# Patient Record
Sex: MALE
[De-identification: names, ages, dates, MRNs, and addresses within clinical notes are randomized per-mention and may not be internally consistent; named-entity substitution may affect disease eponyms.]

---

## 2017-10-25 ENCOUNTER — HOSPITAL ENCOUNTER (INPATIENT)
Dept: HOSPITAL 25 - MEDTELE | Age: 62
LOS: 19 days | Discharge: NURSING, REHAB OR LONG-TERM CARE | DRG: 871 | End: 2017-11-13
Attending: HOSPITALIST | Admitting: HOSPITALIST
Payer: MEDICARE

## 2017-10-25 DIAGNOSIS — I50.9: ICD-10-CM

## 2017-10-25 DIAGNOSIS — I87.8: ICD-10-CM

## 2017-10-25 DIAGNOSIS — Z88.1: ICD-10-CM

## 2017-10-25 DIAGNOSIS — R74.8: ICD-10-CM

## 2017-10-25 DIAGNOSIS — I89.0: ICD-10-CM

## 2017-10-25 DIAGNOSIS — Z99.3: ICD-10-CM
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
## 2017-10-25 DIAGNOSIS — E11.40: ICD-10-CM

## 2017-10-25 DIAGNOSIS — L03.116: ICD-10-CM

## 2017-10-25 DIAGNOSIS — I24.8: ICD-10-CM

## 2017-10-25 DIAGNOSIS — Z88.0: ICD-10-CM

## 2017-10-25 DIAGNOSIS — E87.2: ICD-10-CM

## 2017-10-25 DIAGNOSIS — I24.1: ICD-10-CM

## 2017-10-25 DIAGNOSIS — M62.82: ICD-10-CM

## 2017-10-25 DIAGNOSIS — E78.5: ICD-10-CM

## 2017-10-25 DIAGNOSIS — J96.01: ICD-10-CM

## 2017-10-25 DIAGNOSIS — R33.9: ICD-10-CM

## 2017-10-25 DIAGNOSIS — E11.22: ICD-10-CM

## 2017-10-25 DIAGNOSIS — G93.40: ICD-10-CM

## 2017-10-25 DIAGNOSIS — I48.0: ICD-10-CM

## 2017-10-25 DIAGNOSIS — I95.9: ICD-10-CM

## 2017-10-25 DIAGNOSIS — A41.9: Primary | ICD-10-CM

## 2017-10-25 DIAGNOSIS — N39.0: ICD-10-CM

## 2017-10-25 DIAGNOSIS — K58.9: ICD-10-CM

## 2017-10-25 DIAGNOSIS — N18.9: ICD-10-CM

## 2017-10-25 DIAGNOSIS — Z88.8: ICD-10-CM

## 2017-10-25 DIAGNOSIS — Z88.2: ICD-10-CM

## 2017-10-25 DIAGNOSIS — Z86.14: ICD-10-CM

## 2017-10-25 DIAGNOSIS — Z90.49: ICD-10-CM

## 2017-10-25 DIAGNOSIS — N17.9: ICD-10-CM

## 2017-10-25 DIAGNOSIS — N50.89: ICD-10-CM

## 2017-10-25 DIAGNOSIS — F32.9: ICD-10-CM

## 2017-10-25 DIAGNOSIS — R41.0: ICD-10-CM

## 2017-10-25 DIAGNOSIS — I13.0: ICD-10-CM

## 2017-10-25 DIAGNOSIS — E66.01: ICD-10-CM

## 2017-10-25 DIAGNOSIS — Z87.891: ICD-10-CM

## 2017-10-25 DIAGNOSIS — R21: ICD-10-CM

## 2017-10-25 DIAGNOSIS — L03.115: ICD-10-CM
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
## 2017-10-25 LAB
ALBUMIN SERPL BCG-MCNC: 1.7 G/DL (ref 3.2–5.2)
ALBUMIN SERPL BCG-MCNC: 2.7 G/DL (ref 3.2–5.2)
ALP SERPL-CCNC: 56 U/L (ref 34–104)
ALP SERPL-CCNC: 97 U/L (ref 34–104)
ALT SERPL W P-5'-P-CCNC: 13 U/L (ref 7–52)
ALT SERPL W P-5'-P-CCNC: 7 U/L (ref 7–52)
ANION GAP SERPL CALC-SCNC: 11 MMOL/L (ref 2–11)
ANION GAP SERPL CALC-SCNC: 7 MMOL/L (ref 2–11)
AST SERPL-CCNC: 27 U/L (ref 13–39)
AST SERPL-CCNC: 46 U/L (ref 13–39)
BUN SERPL-MCNC: 45 MG/DL (ref 6–24)
BUN SERPL-MCNC: 64 MG/DL (ref 6–24)
BUN/CREAT SERPL: 24.5 (ref 8–20)
BUN/CREAT SERPL: 26.5 (ref 8–20)
CALCIUM SERPL-MCNC: 5.6 MG/DL (ref 8.6–10.3)
CALCIUM SERPL-MCNC: 8.7 MG/DL (ref 8.6–10.3)
CHLORIDE SERPL-SCNC: 100 MMOL/L (ref 101–111)
CHLORIDE SERPL-SCNC: 116 MMOL/L (ref 101–111)
CK SERPL-CCNC: 465 U/L (ref 10–223)
FERRITIN SERPL IA-MCNC: 92.6 NG/ML (ref 24–336)
FOLATE SERPL-MCNC: 7.59 NG/ML (ref 3.99–?)
GLOBULIN SER CALC-MCNC: 2.5 G/DL (ref 2–4)
GLOBULIN SER CALC-MCNC: 3.5 G/DL (ref 2–4)
GLUCOSE SERPL-MCNC: 120 MG/DL (ref 70–100)
GLUCOSE SERPL-MCNC: 66 MG/DL (ref 70–100)
HCO3 SERPL-SCNC: 15 MMOL/L (ref 22–32)
HCO3 SERPL-SCNC: 21 MMOL/L (ref 22–32)
HCT VFR BLD AUTO: 25 % (ref 42–52)
HCT VFR BLD AUTO: 32 % (ref 42–52)
HGB BLD-MCNC: 10.5 G/DL (ref 14–18)
HGB BLD-MCNC: 8.2 G/DL (ref 14–18)
IRON SERPL-MCNC: < 15 UG/DL (ref 50–212)
MCH RBC QN AUTO: 29 PG (ref 27–31)
MCH RBC QN AUTO: 30 PG (ref 27–31)
MCHC RBC AUTO-ENTMCNC: 33 G/DL (ref 31–36)
MCHC RBC AUTO-ENTMCNC: 33 G/DL (ref 31–36)
MCV RBC AUTO: 89 FL (ref 80–94)
MCV RBC AUTO: 91 FL (ref 80–94)
POTASSIUM SERPL-SCNC: 2.8 MMOL/L (ref 3.5–5)
POTASSIUM SERPL-SCNC: 4.6 MMOL/L (ref 3.5–5)
PROT SERPL-MCNC: 4.2 G/DL (ref 6.4–8.9)
PROT SERPL-MCNC: 6.2 G/DL (ref 6.4–8.9)
RBC # BLD AUTO: 2.77 10^6/UL (ref 4–5.4)
RBC # BLD AUTO: 3.64 10^6/UL (ref 4–5.4)
SODIUM SERPL-SCNC: 132 MMOL/L (ref 133–145)
SODIUM SERPL-SCNC: 138 MMOL/L (ref 133–145)
TIBC SERPL-MCNC: 307 MCG/DL (ref 250–450)
TRANSFERRIN SERPL-MCNC: 219 MG/DL (ref 203–362)
TROPONIN I SERPL-MCNC: 0.28 NG/ML (ref ?–0.04)
TROPONIN I SERPL-MCNC: 0.38 NG/ML (ref ?–0.04)
VIT B12 SERPL-MCNC: 1193 PG/ML (ref 180–914)
WBC # BLD AUTO: 11.1 10^3/UL (ref 3.5–10.8)
WBC # BLD AUTO: 15 10^3/UL (ref 3.5–10.8)

## 2017-10-25 PROCEDURE — 90935 HEMODIALYSIS ONE EVALUATION: CPT

## 2017-10-25 PROCEDURE — 82550 ASSAY OF CK (CPK): CPT

## 2017-10-25 PROCEDURE — 80074 ACUTE HEPATITIS PANEL: CPT

## 2017-10-25 PROCEDURE — 82272 OCCULT BLD FECES 1-3 TESTS: CPT

## 2017-10-25 PROCEDURE — 93005 ELECTROCARDIOGRAM TRACING: CPT

## 2017-10-25 PROCEDURE — 83935 ASSAY OF URINE OSMOLALITY: CPT

## 2017-10-25 PROCEDURE — 83880 ASSAY OF NATRIURETIC PEPTIDE: CPT

## 2017-10-25 PROCEDURE — C8929 TTE W OR WO FOL WCON,DOPPLER: HCPCS

## 2017-10-25 PROCEDURE — 83550 IRON BINDING TEST: CPT

## 2017-10-25 PROCEDURE — 84484 ASSAY OF TROPONIN QUANT: CPT

## 2017-10-25 PROCEDURE — 81015 MICROSCOPIC EXAM OF URINE: CPT

## 2017-10-25 PROCEDURE — 36558 INSERT TUNNELED CV CATH: CPT

## 2017-10-25 PROCEDURE — 86850 RBC ANTIBODY SCREEN: CPT

## 2017-10-25 PROCEDURE — 82728 ASSAY OF FERRITIN: CPT

## 2017-10-25 PROCEDURE — 86901 BLOOD TYPING SEROLOGIC RH(D): CPT

## 2017-10-25 PROCEDURE — 85025 COMPLETE CBC W/AUTO DIFF WBC: CPT

## 2017-10-25 PROCEDURE — 80048 BASIC METABOLIC PNL TOTAL CA: CPT

## 2017-10-25 PROCEDURE — 84100 ASSAY OF PHOSPHORUS: CPT

## 2017-10-25 PROCEDURE — 80053 COMPREHEN METABOLIC PANEL: CPT

## 2017-10-25 PROCEDURE — 87641 MR-STAPH DNA AMP PROBE: CPT

## 2017-10-25 PROCEDURE — 84134 ASSAY OF PREALBUMIN: CPT

## 2017-10-25 PROCEDURE — 86140 C-REACTIVE PROTEIN: CPT

## 2017-10-25 PROCEDURE — 85610 PROTHROMBIN TIME: CPT

## 2017-10-25 PROCEDURE — 84466 ASSAY OF TRANSFERRIN: CPT

## 2017-10-25 PROCEDURE — 99156 MOD SED OTH PHYS/QHP 5/>YRS: CPT

## 2017-10-25 PROCEDURE — 93306 TTE W/DOPPLER COMPLETE: CPT

## 2017-10-25 PROCEDURE — C1752 CATH,HEMODIALYSIS,SHORT-TERM: HCPCS

## 2017-10-25 PROCEDURE — 84520 ASSAY OF UREA NITROGEN: CPT

## 2017-10-25 PROCEDURE — 85014 HEMATOCRIT: CPT

## 2017-10-25 PROCEDURE — 80076 HEPATIC FUNCTION PANEL: CPT

## 2017-10-25 PROCEDURE — 93970 EXTREMITY STUDY: CPT

## 2017-10-25 PROCEDURE — 86900 BLOOD TYPING SEROLOGIC ABO: CPT

## 2017-10-25 PROCEDURE — 83735 ASSAY OF MAGNESIUM: CPT

## 2017-10-25 PROCEDURE — P9047 ALBUMIN (HUMAN), 25%, 50ML: HCPCS

## 2017-10-25 PROCEDURE — 87040 BLOOD CULTURE FOR BACTERIA: CPT

## 2017-10-25 PROCEDURE — 76770 US EXAM ABDO BACK WALL COMP: CPT

## 2017-10-25 PROCEDURE — 81003 URINALYSIS AUTO W/O SCOPE: CPT

## 2017-10-25 PROCEDURE — 94760 N-INVAS EAR/PLS OXIMETRY 1: CPT

## 2017-10-25 PROCEDURE — 87186 SC STD MICRODIL/AGAR DIL: CPT

## 2017-10-25 PROCEDURE — 87077 CULTURE AEROBIC IDENTIFY: CPT

## 2017-10-25 PROCEDURE — 85730 THROMBOPLASTIN TIME PARTIAL: CPT

## 2017-10-25 PROCEDURE — 87086 URINE CULTURE/COLONY COUNT: CPT

## 2017-10-25 PROCEDURE — G0257 UNSCHED DIALYSIS ESRD PT HOS: HCPCS

## 2017-10-25 PROCEDURE — 83540 ASSAY OF IRON: CPT

## 2017-10-25 PROCEDURE — 71010: CPT

## 2017-10-25 PROCEDURE — 82570 ASSAY OF URINE CREATININE: CPT

## 2017-10-25 PROCEDURE — 85018 HEMOGLOBIN: CPT

## 2017-10-25 PROCEDURE — 36415 COLL VENOUS BLD VENIPUNCTURE: CPT

## 2017-10-25 PROCEDURE — 82040 ASSAY OF SERUM ALBUMIN: CPT

## 2017-10-25 PROCEDURE — 82803 BLOOD GASES ANY COMBINATION: CPT

## 2017-10-25 PROCEDURE — 82746 ASSAY OF FOLIC ACID SERUM: CPT

## 2017-10-25 PROCEDURE — 94660 CPAP INITIATION&MGMT: CPT

## 2017-10-25 PROCEDURE — 83605 ASSAY OF LACTIC ACID: CPT

## 2017-10-25 PROCEDURE — 84300 ASSAY OF URINE SODIUM: CPT

## 2017-10-25 PROCEDURE — 70450 CT HEAD/BRAIN W/O DYE: CPT

## 2017-10-25 PROCEDURE — 77001 FLUOROGUIDE FOR VEIN DEVICE: CPT

## 2017-10-25 PROCEDURE — 36600 WITHDRAWAL OF ARTERIAL BLOOD: CPT

## 2017-10-25 PROCEDURE — 99157 MOD SED OTHER PHYS/QHP EA: CPT

## 2017-10-25 PROCEDURE — 76937 US GUIDE VASCULAR ACCESS: CPT

## 2017-10-25 PROCEDURE — 82140 ASSAY OF AMMONIA: CPT

## 2017-10-25 PROCEDURE — 83921 ORGANIC ACID SINGLE QUANT: CPT

## 2017-10-25 PROCEDURE — 82607 VITAMIN B-12: CPT

## 2017-10-25 RX ADMIN — SODIUM CHLORIDE SCH MLS/HR: 900 IRRIGANT IRRIGATION at 17:56

## 2017-10-25 RX ADMIN — INSULIN LISPRO SCH: 100 INJECTION, SOLUTION INTRAVENOUS; SUBCUTANEOUS at 17:56

## 2017-10-25 NOTE — RAD
INDICATION: Pain and swelling.     



COMPARISON: None

 

TECHNIQUE: Duplex interrogation of the Lowerextremity was performed.



FINDINGS: 



Deep veins: The common femoral, great saphenous, profunda femoris, proximal, mid, and

distal deep femoral, popliteal, posterior tibial, and peroneal veins are patent. There is

normal compressibility, augmentation, and phasic flow.



Superficial veins: There are no findings of superficial thrombophlebitis.



Popliteal fossa:There is no evidence of a popliteal cyst.



Soft tissues:There are no soft tissue abnormalities.



IMPRESSION:  Normal examination. No evidence of deep venous thrombosis

## 2017-10-25 NOTE — RAD
INDICATION: Atrial fibrillation. Sepsis. CHF.    



COMPARISON: None

 

TECHNIQUE: An AP portable semierect view obtained at 1603 hours is submitted.



FINDINGS: 



Bones/Soft Tissues: There are no acute bony findings.    



Cardiomediastinal: The cardiac silhouette is prominent. 



Lungs: There are no infiltrates. The examination is mildly expiratory with vascular

crowding



Pleura: There are no pleural effusions. 



Other: None



IMPRESSION: Mildly prominent cardiac silhouette. Expiratory film. Lungs clear.

## 2017-10-26 LAB
ANION GAP SERPL CALC-SCNC: 10 MMOL/L (ref 2–11)
BUN SERPL-MCNC: 67 MG/DL (ref 6–24)
BUN SERPL-MCNC: 68 MG/DL (ref 6–24)
BUN/CREAT SERPL: 25 (ref 8–20)
CALCIUM SERPL-MCNC: 8.7 MG/DL (ref 8.6–10.3)
CHLORIDE SERPL-SCNC: 100 MMOL/L (ref 101–111)
CK SERPL-CCNC: 395 U/L (ref 10–223)
CK SERPL-CCNC: 575 U/L (ref 10–223)
GLUCOSE SERPL-MCNC: 121 MG/DL (ref 70–100)
HCO3 SERPL-SCNC: 20 MMOL/L (ref 22–32)
HCT VFR BLD AUTO: 33 % (ref 42–52)
HGB BLD-MCNC: 10.5 G/DL (ref 14–18)
HGB BLD-MCNC: 10.7 G/DL (ref 14–18)
HGB BLD-MCNC: 10.8 G/DL (ref 14–18)
MCH RBC QN AUTO: 29 PG (ref 27–31)
MCH RBC QN AUTO: 29 PG (ref 27–31)
MCHC RBC AUTO-ENTMCNC: 32 G/DL (ref 31–36)
MCHC RBC AUTO-ENTMCNC: 32 G/DL (ref 31–36)
MCV RBC AUTO: 89 FL (ref 80–94)
MCV RBC AUTO: 90 FL (ref 80–94)
POTASSIUM SERPL-SCNC: 4.6 MMOL/L (ref 3.5–5)
RBC # BLD AUTO: 3.63 10^6/UL (ref 4–5.4)
RBC # BLD AUTO: 3.7 10^6/UL (ref 4–5.4)
SODIUM SERPL-SCNC: 130 MMOL/L (ref 133–145)
SODIUM UR-SCNC: < 18 MMOL/L
TROPONIN I SERPL-MCNC: 0.37 NG/ML (ref ?–0.04)
WBC # BLD AUTO: 13.5 10^3/UL (ref 3.5–10.8)
WBC # BLD AUTO: 14.4 10^3/UL (ref 3.5–10.8)

## 2017-10-26 RX ADMIN — HEPARIN SODIUM SCH UNITS: 5000 INJECTION INTRAVENOUS; SUBCUTANEOUS at 11:04

## 2017-10-26 RX ADMIN — INSULIN LISPRO SCH UNITS: 100 INJECTION, SOLUTION INTRAVENOUS; SUBCUTANEOUS at 12:26

## 2017-10-26 RX ADMIN — METOPROLOL TARTRATE SCH MG: 50 TABLET, FILM COATED ORAL at 11:04

## 2017-10-26 RX ADMIN — INSULIN LISPRO SCH UNITS: 100 INJECTION, SOLUTION INTRAVENOUS; SUBCUTANEOUS at 18:02

## 2017-10-26 RX ADMIN — DIPHENHYDRAMINE HYDROCHLORIDE PRN MG: 25 CAPSULE ORAL at 02:09

## 2017-10-26 RX ADMIN — HEPARIN SODIUM AND DEXTROSE SCH MLS/HR: 5000; 5 INJECTION INTRAVENOUS at 11:03

## 2017-10-26 RX ADMIN — DIPHENHYDRAMINE HYDROCHLORIDE PRN MG: 25 CAPSULE ORAL at 22:02

## 2017-10-26 RX ADMIN — INSULIN LISPRO SCH UNITS: 100 INJECTION, SOLUTION INTRAVENOUS; SUBCUTANEOUS at 08:11

## 2017-10-26 RX ADMIN — METOPROLOL TARTRATE SCH: 50 TABLET, FILM COATED ORAL at 22:07

## 2017-10-26 RX ADMIN — SODIUM CHLORIDE SCH MLS/HR: 900 IRRIGANT IRRIGATION at 15:34

## 2017-10-26 NOTE — ECHO
Patient:      LESLEE MOREAU

Med Rec#:     [id number]            :          1955          

Date:         10/26/2017            Age:          62y                 

Account#:     [id number]          Height:       185.42 cm / 73.0 in

Accession#:   [id number]           Weight:       191.42 kg / 421.9 lbs

Sex:          M                     BSA:          2.96

Room#:        453                   

Admit Date#:  10/25/2017          

Type:         Inpatient

 

Referring:    Miguel Hopson NP

Reading:      Paul Caballero MD

Sonographer:  Diana Beth RDCS

CC:           Tino Loza DO

______________________________________________________________________

 

Transthoracic Echocardiogram

 

Indication:

CHF, A-fib, elevated troponins. 

BP:           104/76

HR:           102

Rhythm:       A-Fib

 

Findings     

History:

Morbid obesity, DM, CHF, A-fib, HTN, HLD, MRSA, CKD, former smoker.  

 

Technical Comments:

The study is technically difficult.  The study is technically limited

due to poor apical windows.  The study is technically limited due to

patient body habitus.  The study was technically limited due to the

patient's inability to lay in the left lateral decubitus position. 

Completed at 0915. 

 

Left Ventricle:

The left ventricular chamber size is mildly dilated. Moderate concentric

left ventricular hypertrophy is observed. There is a focal wall motion

abnormality present.In limited views and with contrast the inferior wall

appears hypokinetic as does the interventricular septum. There is mild

to moderately decreased left ventricular systolic function.  The

estimated ejection fraction is 40-45%.  There is septal flattening of

the interventricular septum consistent with right ventricular volume or

pressure overload.  The assessment of diastolic function is

non-diagnostic. 

 

Left Atrium:

The left atrium is not well visualized. The left atrium is moderate to

severely dilated.  

 

Right Ventricle:

The right ventricle is not well visualized. Moderator Band present. The

right ventricle is moderate to severely dilated.  The right ventricular

global systolic function is mildly to moderately reduced.Imagee qualty

is suboptimal for accurate assessment 

 

Right Atrium:

The right atrium is not well visualized. The right atrial cavity size is

severely dilated. 

 

Aortic Valve:

The aortic valve is trileaflet. The aortic valve leaflets are moderately

thickened.The noncoroanry cusp has a calcified nodule on it. There is a

trace of aortic regurgitation. There is borderline aortic stenosis

present. 

 

Mitral Valve:

The mitral valve leaflets are mildly thickened. There is a trace of

mitral regurgitation. There is no evidence of mitral stenosis. 

 

Tricuspid Valve:

The tricuspid valve leaflets are normal.  There is mild tricuspid

regurgitation. The right ventricular systolic pressure is estimated at

31 mmHg.  There is evidence that pulmonary hypertension may be

underestimated. There is no tricuspid stenosis. 

 

Pulmonic Valve:

The pulmonic valve appears normal. There is trace to mild pulmonic

regurgitation. There is no pulmonic stenosis.  

 

Pericardium:

There is no significant pericardial effusion. A pericardial fat pad is

visualized. 

 

Aorta:

There is no dilatation of the ascending aorta. The aortic arch is not

well visualized.  The aortic root is normal in size. 

 

Pulmonary Artery:

The main pulmonary artery appears normal. 

 

Venous:

The venous system is not well visualized. The inferior vena cava is not

visualized. 

 

Contrast:

Definity was used to optimize study.  6 mL of diluted Definity was

utilized.  Intravenous contrast was used to enhance endocardial border

definition. 

 

Conclusions

The study is technically difficult making comments ingeneral less

reliable. 

The study is technically limited due to poor apical windows. 

The study is technically limited due to patient body habitus. 

The study was technically limited due to the patient's inability to lay

in the left lateral decubitus position.  

There is a focal wall motion abnormality present.In limited views and

with contrast the inferior wall appears hypokinetic as does the

interventricular septum.

There is mild to moderately decreased left ventricular systolic

function. 

The estimated ejection fraction is 40-45%. 

There is septal flattening of the interventricular septum consistent

with right ventricular volume or pressure overload. 

The left atrium is moderate to severely dilated. 

The right ventricle is moderate to severely dilated. 

The right ventricular global systolic function is mild to moderately

reduced.

The right atrial cavity size is severely dilated.

There is a trace of aortic regurgitation.

There is borderline aortic stenosis present.

There is a trace of mitral regurgitation.

There is mild tricuspid regurgitation.

There is trace to mild pulmonic regurgitation.

No reports of prior studies are offered for comparison.

 

Measurements     

Name                    Value         Normal Range            

RVIDd (AP) 2D           4.5 cm        (0.9 - 2.6)             

RVDdMajor (2D)          5.2 cm        (2.2 - 4.4)             

RAd ISD 4CH             6.48 cm       (3.4 - 4.9)             

RA (A4C)W               5.62 cm       (2.9 - 4.6)             

IVSd (2D)               1.5 cm        (0.6 - 1)               

LVPWd (2D)              1.4 cm        (0.6 - 1)               

LVIDd (2D)              5.7 cm        (3.6 - 5.4)             

LVIDs (2D)              4.4 cm        -                        

LV FS (2D)              22 %          (25 - 45)               

Aortic Annulus          2 cm          (1.4 - 2.6)             

Ao root diameter (2D)   2.8 cm        (2.1 - 3.5)             

Ascending Ao            3.1 cm        (2.1 - 3.4)             

LA dimension (AP) 2D    6.3 cm        (2.3 - 3.8)             

LAd ISD 4CH             7.9 cm        (2.9 - 5.3)             

LA ISD 4CH W            6.1 cm        (2.5 - 4.5)             

 

Name                    Value         Normal Range            

MV E-wave Vmax          1.11 m/sec    -                        

MV deceleration time    222.8 msec    -                        

LV septal e' Vmax       0.12 m/sec    -                        

LV lateral e' Vmax      0.11 m/sec    -                        

LV E:e' septal ratio    9.25 ratio    -                        

LV E:e' lateral ratio   10.09 ratio   -                        

 

Name                    Value         Normal Range            

AV Vmax                 1.96 m/sec    -                        

AV VTI                  51.04 cm      -                        

AV peak gradient        15.51 mmHg    -                        

AV mean gradient        10.28 mmHg    -                        

LVOT Vmax               0.8 m/sec     -                        

LVOT VTI                13.4 cm       -                        

LVOT peak gradient      2.58 mmHg     -                        

LVOT mean gradient      1.46 mmHg     -                        

PAUL Vmax                1 m/sec       -                        

 

Name                    Value         Normal Range            

TR Vmax                 2.4 m/sec     -                        

TR peak gradient        23 mmHg       -                        

RAP                     8 mmHg        -                        

RVSP                    31 mmHg       -                        

 

Name                    Value         Normal Range            

PV Vmax                 0.7 m/sec     -                        

PV peak gradient        2.1 mmHg      -                        

 

Electronically signed by: Paul Caballero MD on 10/26/2017 09:55:18

## 2017-10-26 NOTE — PN
Subjective


Date of Service: 10/26/17


Interval History: 





Patient's only acute complaint is itching rash on the right side of his back. 

This is new and has not been experienced before at home. Questionable 

correlation between first administration of vancomycin and onset of rash. No 

other signs of red man syndrome. Partial relief with Benadryl. No CP, SOB, N/V, 

F/C, Abdominal Pain, dysuria. Persistent pain in leg. 


Family History: Unchanged from Admission


Social History: Unchanged from Admission


Past Medical History: Unchanged from Admission





Objective


Active Medications: 








Acetaminophen (Tylenol Tab*)  650 mg PO Q4H PRN


   PRN Reason: FEVER/PAIN


Dextrose (D50w Syringe 50 Ml*)  12.5 gm IV PUSH .FOR FS < 60 - SS PRN


   PRN Reason: FS < 60


Diphenhydramine HCl (Benadryl Po*)  25 mg PO Q6H PRN


   PRN Reason: ITCHING


   Last Admin: 10/26/17 02:09 Dose:  25 mg


Heparin Sodium (Porcine) (Heparin Vial(*))  0 units IV .PER PROTOCOL ALEJANDRA


   PRN Reason: Protocol


   Last Admin: 10/26/17 11:04 Dose:  9,400 units


Sodium Chloride (Ns 0.9% 1000 Ml*)  1,000 mls @ 100 mls/hr IV PER RATE ALEJANDRA


   Stop: 10/27/17 01:59


   Last Admin: 10/26/17 15:34 Dose:  100 mls/hr


Heparin Sodium/Dextrose (Heparin Drip 25,000 Units(*))  25,000 units in 500 mls 

@ 0 mls/hr IVPB .PER RATE ALEJANDRA; Per Protocol


   PRN Reason: Protocol


   Last Admin: 10/26/17 11:03 Dose:  35 mls/hr


Clindamycin HCl/Dextrose (Cleocin 600 Mg Ivpremix(*) Sdv)  600 mg in 50 mls @ 

100 mls/hr IV Q8H [facility]


   Last Admin: 10/26/17 13:32 Dose:  100 mls/hr


Insulin Human Lispro (Humalog*)  0 units SUBCUT AC ALEJANDRA


   PRN Reason: Protocol


   Last Admin: 10/26/17 12:26 Dose:  2 units


Metoprolol Tartrate (Lopressor Tab*)  50 mg PO BID [facility]


   Last Admin: 10/26/17 11:04 Dose:  50 mg


Ondansetron HCl (Zofran Inj*)  4 mg IV Q6H PRN


   PRN Reason: NAUSEA








 Vital Signs











  10/25/17 10/25/17 10/25/17





  17:43 19:15 19:30


 


Temperature  97.7 F 


 


Pulse Rate  110 


 


Respiratory  16 16





Rate   


 


Blood Pressure 108/68 104/62 





(mmHg)   


 


O2 Sat by Pulse  97 





Oximetry   














  10/25/17 10/25/17 10/25/17





  21:07 22:37 22:52


 


Temperature 98.1 F 97.6 F 97.6 F


 


Pulse Rate 102 67 138


 


Respiratory 16 20 20





Rate   


 


Blood Pressure 98/63 106/61 106/61





(mmHg)   


 


O2 Sat by Pulse 96 97 97





Oximetry   














  10/25/17 10/26/17 10/26/17





  23:21 00:03 01:47


 


Temperature  97.9 F 97.5 F


 


Pulse Rate  115 90


 


Respiratory  20 16





Rate   


 


Blood Pressure  100/52 102/55





(mmHg)   


 


O2 Sat by Pulse 97 94 98





Oximetry   














  10/26/17 10/26/17 10/26/17





  02:09 02:22 02:26


 


Temperature   


 


Pulse Rate  103 


 


Respiratory 18  





Rate   


 


Blood Pressure  110/66 103/70





(mmHg)   


 


O2 Sat by Pulse  92 





Oximetry   














  10/26/17 10/26/17 10/26/17





  02:31 02:34 02:35


 


Temperature   


 


Pulse Rate   


 


Respiratory   





Rate   


 


Blood Pressure 120/67 113/69 113/70





(mmHg)   


 


O2 Sat by Pulse   





Oximetry   














  10/26/17 10/26/17 10/26/17





  02:37 02:39 02:41


 


Temperature   


 


Pulse Rate 110 103 


 


Respiratory   





Rate   


 


Blood Pressure 111/68 112/71 113/68





(mmHg)   


 


O2 Sat by Pulse 81 90 





Oximetry   














  10/26/17 10/26/17 10/26/17





  02:46 02:56 03:00


 


Temperature   


 


Pulse Rate 104  107


 


Respiratory   





Rate   


 


Blood Pressure 111/73 100/75 





(mmHg)   


 


O2 Sat by Pulse 93  94





Oximetry   














  10/26/17 10/26/17 10/26/17





  03:11 03:26 03:30


 


Temperature   


 


Pulse Rate 104 114 104


 


Respiratory   





Rate   


 


Blood Pressure 108/75 89/65 106/80





(mmHg)   


 


O2 Sat by Pulse 93 92 93





Oximetry   














  10/26/17 10/26/17 10/26/17





  03:45 03:47 03:48


 


Temperature   


 


Pulse Rate  104 105


 


Respiratory   





Rate   


 


Blood Pressure 122/67 84/42 105/74





(mmHg)   


 


O2 Sat by Pulse  93 93





Oximetry   














  10/26/17 10/26/17 10/26/17





  03:56 04:00 04:09


 


Temperature   


 


Pulse Rate 103 103 


 


Respiratory   16





Rate   


 


Blood Pressure 103/67  





(mmHg)   


 


O2 Sat by Pulse 93 95 





Oximetry   














  10/26/17 10/26/17 10/26/17





  04:11 04:13 04:20


 


Temperature  98.3 F 


 


Pulse Rate 103  105


 


Respiratory   





Rate   


 


Blood Pressure 97/61  114/71





(mmHg)   


 


O2 Sat by Pulse 94  95





Oximetry   














  10/26/17 10/26/17 10/26/17





  04:37 04:41 05:00


 


Temperature   


 


Pulse Rate   105


 


Respiratory   





Rate   


 


Blood Pressure 104/76 113/80 





(mmHg)   


 


O2 Sat by Pulse   95





Oximetry   














  10/26/17 10/26/17 10/26/17





  05:11 05:26 06:00


 


Temperature   


 


Pulse Rate 103 109 108


 


Respiratory   





Rate   


 


Blood Pressure 117/73 101/76 





(mmHg)   


 


O2 Sat by Pulse 95 97 94





Oximetry   














  10/26/17 10/26/17 10/26/17





  07:00 07:12 07:35


 


Temperature   97.2 F


 


Pulse Rate 104 103 


 


Respiratory   22





Rate   


 


Blood Pressure  105/60 





(mmHg)   


 


O2 Sat by Pulse 96 95 





Oximetry   














  10/26/17 10/26/17 10/26/17





  08:00 08:23 09:00


 


Temperature   


 


Pulse Rate 103 102 103


 


Respiratory 22  





Rate   


 


Blood Pressure  91/55 





(mmHg)   


 


O2 Sat by Pulse 94 92 95





Oximetry   














  10/26/17 10/26/17 10/26/17





  09:07 09:09 09:22


 


Temperature   


 


Pulse Rate 103  103


 


Respiratory   





Rate   


 


Blood Pressure 111/68 111/71 107/81





(mmHg)   


 


O2 Sat by Pulse 97  94





Oximetry   














  10/26/17 10/26/17 10/26/17





  10:00 15:21 16:00


 


Temperature   97.3 F


 


Pulse Rate 102 81 


 


Respiratory  18 





Rate   


 


Blood Pressure  92/65 





(mmHg)   


 


O2 Sat by Pulse 98 98 





Oximetry   











Oxygen Devices in Use Now: None


Appearance: Patient is a 61yo morbidly obese male who appears older than stated 

age and is sitting in the bed in NAD.


Eyes: No Scleral Icterus, PERRLA


Ears/Nose/Mouth/Throat: NL Teeth, Lips, Gums, Clear Oropharnyx, Mucous 

Membranes Moist, - - High Mallampati score.


Neck: NL Appearance and Movements; NL JVP, Trachea Midline


Respiratory: Symmetrical Chest Expansion and Respiratory Effort, Clear to 

Auscultation


Cardiovascular: NL Sounds; No Murmurs; No JVD, RRR, - - 4+ pitting edema in the 

B/L LE, worse in left than right.


Abdominal: NL Sounds; No Tenderness; No Distention, No Hepatosplenomegaly, - - 

Exam limited by morbid obesity.


Lymphatic: No Cervical Adenopathy, No Inguinal Adenopathy


Skin: - - Redness and blanchable skin edema on Right side. Superficial 

lacerations on leg with erythema without weeping.


Neurological: Alert and Oriented x 3, - - CN II-XII intact.


Result Diagrams: 


 10/26/17 10:28





 10/26/17 10:28


Microbiology and Other Data: 


 Microbiology











 10/25/17 16:30 Nasal Screen MRSA (PCR)(RAHUL) - Final





 Nasal    Mrsa Negative














Assess/Plan/Problems-Billing


Assessment: 





Patient is a 61yo male with a PMH significant for Morbid obesity, CHF, 

Paroxysmal Afib, DMII, HTN, HLD, Depression, CKD who presents after a fall 

without head trauma and prolonged laying and sepsis presumably secondary to a 

lower leg wound or a UTI, also with mild rhabdomyolysis, Prerenal JOSHUA, and 

NSTEMI presumably from demand ischemia. Patient is improving with fluids and 

antibiotics.





- Patient Problems


(1) Cellulitis


Current Visit: Yes   Status: Acute   Code(s): L03.90 - CELLULITIS, UNSPECIFIED 

  SNOMED Code(s): 377909796


   Comment: 


Presumable cause of sepsis. Chronic leg wound. Was supposed to follow up with 

wound clinic today. No inguinal lymphadenopathy palpated. Erythema up whole 

left leg to groin. Appreciate ID input, Clindamycin ordered. Will order 

probiotic.


Appreciate wound care consult. Keep legs elevated to decrease edema and aid 

healing.    





(2) CHF (congestive heart failure)


Current Visit: Yes   Status: Acute   Code(s): I50.9 - HEART FAILURE, 

UNSPECIFIED   SNOMED Code(s): 44473753


   Comment: 


EF 40-45 by most recent Echo, will attempt to get records to compare. NSTEMI 

most likely due to demand ischemia, trending down. Consider outpatient stress 

test. Hold lasix at this time due to prerenal JOSHUA by FeNa. 


Consider ACEI on discharge after JOSHUA resolves.   





(3) UTI (urinary tract infection)


Current Visit: Yes   Status: Acute   Comment: 


Positive Leukocyte esterase without nitrate. No Dysuria. Awaiting culture and 

sensativities.  Continue Clindamycin.   





(4) Rhabdomyolysis


Current Visit: Yes   Status: Acute   Code(s): M62.82 - RHABDOMYOLYSIS   SNOMED 

Code(s): 632655948


   Comment: 


CK peaked at 575, tending down, JOSHUA prerenal, will keep hydrated to avoid 

rhabdomyolysis induced JOSHUA.   





(5) JOSHUA (acute kidney injury)


Current Visit: Yes   Status: Acute   Code(s): N17.9 - ACUTE KIDNEY FAILURE, 

UNSPECIFIED   SNOMED Code(s): 71236325


   Comment: 


Prerenal by FeNa, dehydrated on admission. Given 2.5L of fluid at this point, 

will gently supplement fluids and continue to monitor kidney function. Hold 

Lasix.    





(6) NSTEMI (non-ST elevated myocardial infarction)


Current Visit: Yes   Status: Acute   Code(s): I21.4 - NON-ST ELEVATION (NSTEMI) 

MYOCARDIAL INFARCTION   SNOMED Code(s): 218793182


   Comment: 


Probably due to demand ischemia. No CP, SOB, or EKG changes. Consider 

outpatient stress test.   





(7) HTN (hypertension)


Current Visit: Yes   Status: Acute   Code(s): I10 - ESSENTIAL (PRIMARY) 

HYPERTENSION   SNOMED Code(s): 62084221


   Comment: 


Borderline hypotension on metoprolol for rate control. Will continue to 

monitor.    





(8) HLD (hyperlipidemia)


Current Visit: Yes   Status: Acute   Code(s): E78.5 - HYPERLIPIDEMIA, 

UNSPECIFIED   SNOMED Code(s): 97850606


   Comment: 


Hold simvastatin due to rhabdomyolysis.   





(9) Afib


Current Visit: Yes   Status: Acute   Code(s): I48.91 - UNSPECIFIED ATRIAL 

FIBRILLATION   SNOMED Code(s): 87926134


   Comment: 


Paroxysmal, hard to interpret EKG due to body habitus. Probable Afib after 

probable NSR. Rate controlled. Heparin Drip for Anticoagulation due JOSHUA. 

Continue Eliquis at discharge if able.   





(10) Diabetes mellitus


Current Visit: Yes   Status: Acute   Code(s): E11.9 - TYPE 2 DIABETES MELLITUS 

WITHOUT COMPLICATIONS   SNOMED Code(s): 96120346


   Comment: 


FSBG and SSI.   


Status and Disposition: 








Patient is admitted inpatient. Will Discharge when medically stable.

## 2017-10-26 NOTE — CONS
CONSULTATION REPORT:

 

DATE OF CONSULT:  10/26/17

 

REQUESTING PROVIDER:  AGAPITO Blunt

 

CONSULTING SERVICE:  Infectious Disease.

 

REASON FOR CONSULT:  Left lower extremity cellulitis.

 

IMPRESSION:

1.  Left lower extremity lymphedema with venous stasis changes and cellulitis, 
likely streptococcal.

2.  Morbid obesity.

3.  Non-ST elevation myocardial infarction.

4.  Sepsis present on admission.

5.  Multiple antibiotic allergies including CEPHALOSPORINS, PENICILLIN, and 
SULFA causing hives.

 

RECOMMENDATION:  Stop meropenem, start clindamycin 600 mg IV every 8 hours and 
follow his leg.  A Wound consult for his severe stasis dermatitis would be 
helpful. We will stop the vancomycin.

 

HISTORY OF PRESENT ILLNESS:  This is a 62-year-old man with left lower 
extremity cellulitis, transferred here from [facility].  He had been 
developing some redness in the left leg over the last few days and had some 
drainage from his leg chronically as well.  He had presented to the [facility].  He was found to have acute kidney injury in the setting of 
chronic kidney disease, he was transferred here.  He was placed on cefepime and 
then meropenem.  Fevers improved. Blood cultures are pending.  A urinalysis 
done showed leukocyte esterase, no nitrites.  He has had elevated troponin, he 
is to have a cardiac evaluation.  He has no chest pain.  He had some shortness 
of breath.  He has noticed left leg is painful.  It is a little bit better 
since he has kept it elevated while he has been here.  He had no draining 
fluid.  No discrete wound.

 

PAST MEDICAL HISTORY:

1.  Diabetes.

2.  Congestive heart failure.

3.  Atrial fibrillation.

4.  Hypertension.

5.  Hyperlipidemia.

6.  Depression.

7.  Irritable bowel syndrome.

8.  Past MRSA infection

9.  Chronic kidney disease.

10.  Diverticulitis.

11.  History of partial colectomy with colostomy and then reversal.

12.  Status post bilateral knee surgery.

 

MEDICATIONS:

1.  Meropenem 500 mg IV every day.

2.  Insulin.

3.  Heparin infusion.

4.  Diltiazem infusion.

5.  Zofran.

6.  Vancomycin 1 g q.12 hours.

7.  Benadryl.

 

ALLERGIES:  KEFLEX, PENICILLIN, BACTRIM caused hives, XARELTO, and WARFARIN.

 

FAMILY HISTORY:  Both  in their 80s with no particular illness that he 
knows of.

 

SOCIAL HISTORY:  Past smoker, past alcohol.  Lives by himself.

 

REVIEW OF SYSTEMS:  A 14-point review of systems was negative except as noted 
above.

 

PHYSICAL EXAM:  Vital Signs:  Temperature 36.2, heart rate 100, respiratory 
rate 22, blood pressure 105/60, O2 sat 95% on room air.  General:  He is awake, 
nondistressed.  Neurologic:  He is oriented x3.  Follows all commands.  He has 
sensation decreased to light touch in both legs.  HEENT:  There is no 
conjunctival hemorrhage.  Oropharynx without lesions.  Neck:  Supple.  Lymph 
Nodes:  There is no cervical, supraclavicular, inguinal, axillary, or 
epitrochlear lymphadenopathy. Heart:  Regular and tachycardic without murmurs.  
Lungs:  Clear to auscultation bilaterally.  Abdomen:  Soft, nontender, 
nondistended, obese, well-healed midline scar.  Skin:  There are bilateral 
lower extremity venous stasis changes.  On the left, there is diffuse erythema 
and warmth extending up through the mid thigh from the ankle.  There is no knee 
or ankle tenderness to palpation.

 

LABORATORY DATA:  White blood cell count 13, hemoglobin 10, platelets 320. 
Creatinine is 2.7. Please see impressions and recommendation as outlined above, 
which I have discussed with AGAPITO Blunt.

 

Thank you for asking me to see Ms. Blake in consultation.

 

 [id number]/CPS #: [id number]

MARIBEL

## 2017-10-27 LAB
ALBUMIN SERPL BCG-MCNC: 2.7 G/DL (ref 3.2–5.2)
ALP SERPL-CCNC: 102 U/L (ref 34–104)
ALT SERPL W P-5'-P-CCNC: 16 U/L (ref 7–52)
ANION GAP SERPL CALC-SCNC: 11 MMOL/L (ref 2–11)
AST SERPL-CCNC: 36 U/L (ref 13–39)
BUN SERPL-MCNC: 71 MG/DL (ref 6–24)
BUN/CREAT SERPL: 23.8 (ref 8–20)
CALCIUM SERPL-MCNC: 9.1 MG/DL (ref 8.6–10.3)
CHLORIDE SERPL-SCNC: 99 MMOL/L (ref 101–111)
GLOBULIN SER CALC-MCNC: 3.9 G/DL (ref 2–4)
GLUCOSE SERPL-MCNC: 90 MG/DL (ref 70–100)
HCO3 SERPL-SCNC: 19 MMOL/L (ref 22–32)
HCT VFR BLD AUTO: 35 % (ref 42–52)
HGB BLD-MCNC: 11.5 G/DL (ref 14–18)
MAGNESIUM SERPL-MCNC: 2.2 MG/DL (ref 1.9–2.7)
MCH RBC QN AUTO: 30 PG (ref 27–31)
MCHC RBC AUTO-ENTMCNC: 33 G/DL (ref 31–36)
MCV RBC AUTO: 90 FL (ref 80–94)
POTASSIUM SERPL-SCNC: 4.5 MMOL/L (ref 3.5–5)
PROT SERPL-MCNC: 6.6 G/DL (ref 6.4–8.9)
RBC # BLD AUTO: 3.89 10^6/UL (ref 4–5.4)
SODIUM SERPL-SCNC: 129 MMOL/L (ref 133–145)
TRANSFERRIN SERPL-MCNC: 206 MG/DL (ref 200–360)
WBC # BLD AUTO: 12.8 10^3/UL (ref 3.5–10.8)

## 2017-10-27 RX ADMIN — HEPARIN SODIUM AND DEXTROSE SCH MLS/HR: 5000; 5 INJECTION INTRAVENOUS at 17:28

## 2017-10-27 RX ADMIN — HEPARIN SODIUM AND DEXTROSE SCH MLS/HR: 5000; 5 INJECTION INTRAVENOUS at 02:07

## 2017-10-27 RX ADMIN — HEPARIN SODIUM SCH UNITS: 5000 INJECTION INTRAVENOUS; SUBCUTANEOUS at 05:52

## 2017-10-27 RX ADMIN — HEPARIN SODIUM AND DEXTROSE SCH MLS/HR: 5000; 5 INJECTION INTRAVENOUS at 14:43

## 2017-10-27 RX ADMIN — INSULIN LISPRO SCH: 100 INJECTION, SOLUTION INTRAVENOUS; SUBCUTANEOUS at 17:02

## 2017-10-27 RX ADMIN — INSULIN LISPRO SCH: 100 INJECTION, SOLUTION INTRAVENOUS; SUBCUTANEOUS at 12:16

## 2017-10-27 RX ADMIN — Medication SCH CAP: at 14:44

## 2017-10-27 RX ADMIN — INSULIN LISPRO SCH: 100 INJECTION, SOLUTION INTRAVENOUS; SUBCUTANEOUS at 08:17

## 2017-10-27 RX ADMIN — HEPARIN SODIUM SCH UNITS: 5000 INJECTION INTRAVENOUS; SUBCUTANEOUS at 14:43

## 2017-10-27 RX ADMIN — METOPROLOL TARTRATE SCH MG: 50 TABLET, FILM COATED ORAL at 20:15

## 2017-10-27 RX ADMIN — METOPROLOL TARTRATE SCH: 50 TABLET, FILM COATED ORAL at 09:02

## 2017-10-27 NOTE — PN
Subjective


Date of Service: 10/27/17


Interval History: 





Patient states he feels better than he has in months and denies any complaints 

including CP, N/V, F/C, Dizziness, Abdominal Pain, Dysuria, or other pain. 

Patient denies SOB, but can only speak for several words at a time without 

having to stop to breathe.


Family History: Unchanged from Admission


Social History: Unchanged from Admission


Past Medical History: Unchanged from Admission





Objective


Active Medications: 








Acetaminophen (Tylenol Tab*)  650 mg PO Q4H PRN


   PRN Reason: FEVER/PAIN


Dextrose (D50w Syringe 50 Ml*)  12.5 gm IV PUSH .FOR FS < 60 - SS PRN


   PRN Reason: FS < 60


Diphenhydramine HCl (Benadryl Po*)  25 mg PO Q6H PRN


   PRN Reason: ITCHING


   Last Admin: 10/26/17 22:02 Dose:  25 mg


Heparin Sodium (Porcine) (Heparin Vial(*))  0 units IV .PER PROTOCOL [facility]


   PRN Reason: Protocol


   Last Admin: 10/27/17 14:43 Dose:  5,000 units


Heparin Sodium/Dextrose (Heparin Drip 25,000 Units(*))  25,000 units in 500 mls 

@ 0 mls/hr IVPB .PER RATE [facility]; Per Protocol


   PRN Reason: Protocol


   Last Admin: 10/27/17 17:28 Dose:  45 mls/hr


Clindamycin HCl/Dextrose (Cleocin 600 Mg Ivpremix(*) Sdv)  600 mg in 50 mls @ 

100 mls/hr IV Q8H [facility]


   Last Admin: 10/27/17 12:56 Dose:  100 mls/hr


Sodium Chloride (Ns 0.9% 1000 Ml*)  1,000 mls @ 100 mls/hr IV PER RATE [facility]


   Stop: 10/28/17 00:29


   Last Admin: 10/27/17 14:44 Dose:  100 mls/hr


Insulin Human Lispro (Humalog*)  0 units SUBCUT AC [facility]


   PRN Reason: Protocol


   Last Admin: 10/27/17 17:02 Dose:  Not Given


Lactobacillus Rhamnosus (Culturelle*)  1 cap PO DAILY [facility]


   Last Admin: 10/27/17 14:44 Dose:  1 cap


Metoprolol Tartrate (Lopressor Tab*)  50 mg PO BID [facility]


   Last Admin: 10/27/17 09:02 Dose:  Not Given


Ondansetron HCl (Zofran Inj*)  4 mg IV Q6H PRN


   PRN Reason: NAUSEA








 Vital Signs











  10/26/17 10/26/17 10/26/17





  19:54 20:00 22:02


 


Temperature   


 


Pulse Rate 80  


 


Respiratory 19 20 20





Rate   


 


Blood Pressure 93/65  





(mmHg)   


 


O2 Sat by Pulse 99  





Oximetry   














  10/27/17 10/27/17 10/27/17





  00:02 03:35 08:00


 


Temperature 98.6 F 99.0 F 


 


Pulse Rate 76 85 


 


Respiratory 20 20 16





Rate   


 


Blood Pressure 99/65 98/69 





(mmHg)   


 


O2 Sat by Pulse 98 97 





Oximetry   














  10/27/17 10/27/17 10/27/17





  08:38 09:02 11:11


 


Temperature 97.3 F 97.3 F 97.4 F


 


Pulse Rate 84 84 92


 


Respiratory 20 20 20





Rate   


 


Blood Pressure 94/63 94/63 96/64





(mmHg)   


 


O2 Sat by Pulse 95 95 97





Oximetry   














  10/27/17





  15:20


 


Temperature 


 


Pulse Rate 91


 


Respiratory 18





Rate 


 


Blood Pressure 103/62





(mmHg) 


 


O2 Sat by Pulse 100





Oximetry 











Oxygen Devices in Use Now: None


Appearance: Patient is a 61yo morbidly obese male who appears older than stated 

age and is sitting in the bed in NAD.


Eyes: No Scleral Icterus, PERRLA


Ears/Nose/Mouth/Throat: NL Teeth, Lips, Gums, Clear Oropharnyx, Mucous 

Membranes Moist


Neck: NL Appearance and Movements; NL JVP, Trachea Midline


Respiratory: Symmetrical Chest Expansion and Respiratory Effort, - - Slight 

expiratory wheezes heard throughout.


Cardiovascular: NL Sounds; No Murmurs; No JVD, RRR, - - Pulses 2+ in the 

bilateral PT, DP and Radial Areas.


Abdominal: NL Sounds; No Tenderness; No Distention, No Hepatosplenomegaly, - - 

Exam limited by body habitus. Large hypertrophic scar consistent with previous 

abdominal surgery.


Lymphatic: No Cervical Adenopathy


Extremities: No Clubbing, Cyanosis, - - 4+ pitting edema in the B/L LE, worse 

on the left than the right with erythma and scaling of the skin worse on the 

left than the right. No interval improvement.


Neurological: Alert and Oriented x 3


Result Diagrams: 


 10/27/17 04:20





 10/27/17 04:20


Microbiology and Other Data: 


 Microbiology











 10/25/17 16:30 Nasal Screen MRSA (PCR)(RAHUL) - Final





 Nasal    Mrsa Negative














Assess/Plan/Problems-Billing


Assessment: 





Patient is a 61yo male with a PMH significant for Morbid obesity, CHF, 

Paroxysmal Afib, DMII, HTN, HLD, Depression, CKD who presents after a fall 

without head trauma and prolonged laying and sepsis presumably secondary to a 

lower leg wound or a UTI, also with mild rhabdomyolysis, Prerenal JOSHUA, and 

NSTEMI presumably from demand ischemia. Patient is improving with fluids and 

antibiotics.





- Patient Problems


(1) Cellulitis


Current Visit: Yes   Status: Acute   Code(s): L03.90 - CELLULITIS, UNSPECIFIED 

  SNOMED Code(s): 906325528


   Comment: 


Presumable cause of sepsis. Chronic leg wound. Was supposed to follow up with 

wound clinic today. No inguinal lymphadenopathy palpated. Erythema up whole 

left leg to groin. Appreciate ID input, Clindamycin ordered. Will order 

probiotic.


Appreciate wound care consult. Keep legs elevated to decrease edema and aid 

healing. 


Kidney functioning still deteriorating with prerenal cause. Will continue 

fluids and hold lasix.   





(2) CHF (congestive heart failure)


Current Visit: Yes   Status: Acute   Code(s): I50.9 - HEART FAILURE, 

UNSPECIFIED   SNOMED Code(s): 51194846


   Comment: 


EF 40-45 by most recent Echo, Consistent with 11/16 TTE. NSTEMI most likely due 

to demand ischemia, trending down. Consider outpatient stress test. Hold lasix 

at this time due to prerenal JOSHUA by FeNa. 


Consider ACEI on discharge after JOSHUA resolves.   





(3) UTI (urinary tract infection)


Current Visit: Yes   Status: Acute   Comment: 


Positive Leukocyte esterase without nitrate. No Dysuria. Awaiting culture and 

sensativities.  Continue Clindamycin.   





(4) Rhabdomyolysis


Current Visit: Yes   Status: Acute   Code(s): M62.82 - RHABDOMYOLYSIS   SNOMED 

Code(s): 738131411


   Comment: 


CK peaked at 575, trending down, JOSHUA prerenal, will keep hydrated to avoid 

rhabdomyolysis induced JOSHUA.   





(5) JOSHUA (acute kidney injury)


Current Visit: Yes   Status: Acute   Code(s): N17.9 - ACUTE KIDNEY FAILURE, 

UNSPECIFIED   SNOMED Code(s): 53243925


   Comment: 


Prerenal by FeNa, dehydrated on admission. Creatinine still increasing. 

Continue gentle fluids.  Given 3.5L of fluid at this point, will gently 

supplement fluids and continue to monitor kidney function. Hold Lasix.    





(6) NSTEMI (non-ST elevated myocardial infarction)


Current Visit: Yes   Status: Acute   Code(s): I21.4 - NON-ST ELEVATION (NSTEMI) 

MYOCARDIAL INFARCTION   SNOMED Code(s): 412406680


   Comment: 


Probably due to demand ischemia. No CP, SOB, or EKG changes. Consider 

outpatient stress test.   





(7) HTN (hypertension)


Current Visit: Yes   Status: Acute   Code(s): I10 - ESSENTIAL (PRIMARY) 

HYPERTENSION   SNOMED Code(s): 05926003


   Comment: 


Borderline hypotension on metoprolol for rate control. Will continue to 

monitor. One dose held today for hypotension.   





(8) HLD (hyperlipidemia)


Current Visit: Yes   Status: Acute   Code(s): E78.5 - HYPERLIPIDEMIA, 

UNSPECIFIED   SNOMED Code(s): 49064697


   Comment: 


Hold simvastatin due to rhabdomyolysis.   





(9) Afib


Current Visit: Yes   Status: Acute   Code(s): I48.91 - UNSPECIFIED ATRIAL 

FIBRILLATION   SNOMED Code(s): 58725167


   Comment: 


Paroxysmal, hard to interpret EKG due to body habitus. Probable Afib after 

probable NSR. Rate controlled. Heparin Drip for Anticoagulation due JOSHUA. 

Continue Eliquis at discharge if able. Unknown reaction to warfarin.   





(10) Diabetes mellitus


Current Visit: Yes   Status: Acute   Code(s): E11.9 - TYPE 2 DIABETES MELLITUS 

WITHOUT COMPLICATIONS   SNOMED Code(s): 28986594


   Comment: 


FSBG and SSI.   


Status and Disposition: 








Patient is admitted inpatient. Will Discharge when medically stable.

## 2017-10-28 LAB
ADD DIFF/SLIDE REVIEW?: (no result)
ANION GAP SERPL CALC-SCNC: 9 MMOL/L (ref 2–11)
BUN SERPL-MCNC: 76 MG/DL (ref 6–24)
BUN/CREAT SERPL: 24.9 (ref 8–20)
CALCIUM SERPL-MCNC: 9.4 MG/DL (ref 8.6–10.3)
CHLORIDE SERPL-SCNC: 98 MMOL/L (ref 101–111)
CK SERPL-CCNC: 86 U/L (ref 10–223)
GLUCOSE SERPL-MCNC: 90 MG/DL (ref 70–100)
HCO3 SERPL-SCNC: 22 MMOL/L (ref 22–32)
HCT VFR BLD AUTO: 36 % (ref 42–52)
HGB BLD-MCNC: 11.5 G/DL (ref 14–18)
MAGNESIUM SERPL-MCNC: 2.4 MG/DL (ref 1.9–2.7)
MCH RBC QN AUTO: 29 PG (ref 27–31)
MCHC RBC AUTO-ENTMCNC: 33 G/DL (ref 31–36)
MCV RBC AUTO: 90 FL (ref 80–94)
POTASSIUM SERPL-SCNC: 4.9 MMOL/L (ref 3.5–5)
PREALB SERPL-MCNC: < 3 MG/DL (ref 18–38)
RBC # BLD AUTO: 3.92 10^6/UL (ref 4–5.4)
SODIUM SERPL-SCNC: 129 MMOL/L (ref 133–145)
WBC # BLD AUTO: 11.7 10^3/UL (ref 3.5–10.8)

## 2017-10-28 RX ADMIN — INSULIN LISPRO SCH: 100 INJECTION, SOLUTION INTRAVENOUS; SUBCUTANEOUS at 08:01

## 2017-10-28 RX ADMIN — HEPARIN SODIUM SCH UNITS: 5000 INJECTION INTRAVENOUS; SUBCUTANEOUS at 21:26

## 2017-10-28 RX ADMIN — Medication SCH CAP: at 09:33

## 2017-10-28 RX ADMIN — BENZONATATE SCH MG: 100 CAPSULE ORAL at 22:28

## 2017-10-28 RX ADMIN — INSULIN LISPRO SCH: 100 INJECTION, SOLUTION INTRAVENOUS; SUBCUTANEOUS at 16:13

## 2017-10-28 RX ADMIN — HEPARIN SODIUM AND DEXTROSE SCH MLS/HR: 5000; 5 INJECTION INTRAVENOUS at 05:31

## 2017-10-28 RX ADMIN — METOPROLOL TARTRATE SCH MG: 25 TABLET, FILM COATED ORAL at 22:28

## 2017-10-28 RX ADMIN — ATORVASTATIN CALCIUM SCH MG: 10 TABLET, FILM COATED ORAL at 16:48

## 2017-10-28 RX ADMIN — INSULIN LISPRO SCH: 100 INJECTION, SOLUTION INTRAVENOUS; SUBCUTANEOUS at 11:39

## 2017-10-28 RX ADMIN — METOPROLOL TARTRATE SCH MG: 50 TABLET, FILM COATED ORAL at 09:33

## 2017-10-28 NOTE — PN
Subjective


Date of Service: 10/28/17


Interval History: 








Patient seen and examined at his bedside. He reports he feels much better today 

but c/o "back itching" and increased edema in his LE, and scrotum. He denies SOB

/CP. No fevers or chills. No N/V/D. Reports good appetite. No abdominal pain. 








Family History: Unchanged from Admission


Social History: Unchanged from Admission


Past Medical History: Unchanged from Admission





Objective


Active Medications: 








Acetaminophen (Tylenol Tab*)  650 mg PO Q4H PRN


   PRN Reason: FEVER/PAIN


Dextrose (D50w Syringe 50 Ml*)  12.5 gm IV PUSH .FOR FS < 60 - SS PRN


   PRN Reason: FS < 60


Diphenhydramine HCl (Benadryl Po*)  25 mg PO Q6H PRN


   PRN Reason: ITCHING


   Last Admin: 10/26/17 22:02 Dose:  25 mg


Heparin Sodium (Porcine) (Heparin Vial(*))  0 units IV .PER PROTOCOL [facility]


   PRN Reason: Protocol


   Last Admin: 10/27/17 14:43 Dose:  5,000 units


Hydroxyzine HCl (Atarax Tab*)  25 mg PO Q4H PRN


   PRN Reason: PRURITIS


Heparin Sodium/Dextrose (Heparin Drip 25,000 Units(*))  25,000 units in 500 mls 

@ 0 mls/hr IVPB .PER RATE [facility]; Per Protocol


   PRN Reason: Protocol


   Last Admin: 10/28/17 05:31 Dose:  40 mls/hr


Clindamycin HCl/Dextrose (Cleocin 600 Mg Ivpremix(*) Sdv)  600 mg in 50 mls @ 

100 mls/hr IV Q8H [facility]


   Last Admin: 10/28/17 13:24 Dose:  100 mls/hr


Insulin Human Lispro (Humalog*)  0 units SUBCUT AC [facility]


   PRN Reason: Protocol


   Last Admin: 10/28/17 11:39 Dose:  Not Given


Lactobacillus Rhamnosus (Culturelle*)  1 cap PO DAILY [facility]


   Last Admin: 10/28/17 09:33 Dose:  1 cap


Metoprolol Tartrate (Lopressor Tab*)  50 mg PO BID [facility]


   Last Admin: 10/28/17 09:33 Dose:  50 mg


Ondansetron HCl (Zofran Inj*)  4 mg IV Q6H PRN


   PRN Reason: NAUSEA








 Vital Signs











  10/27/17 10/27/17 10/27/17





  15:20 19:38 20:00


 


Temperature   


 


Pulse Rate 91 94 


 


Respiratory 18 18 20





Rate   


 


Blood Pressure 103/62 114/65 





(mmHg)   


 


O2 Sat by Pulse 100 98 





Oximetry   














  10/27/17 10/28/17 10/28/17





  23:30 04:09 07:38


 


Temperature 97.3 F 97.3 F 


 


Pulse Rate 91 88 


 


Respiratory 20 24 22





Rate   


 


Blood Pressure 102/70 88/57 





(mmHg)   


 


O2 Sat by Pulse 98 100 





Oximetry   














  10/28/17 10/28/17 10/28/17





  07:39 11:19 11:29


 


Temperature 97.1 F 97.3 F 97.3 F


 


Pulse Rate 86 83 83


 


Respiratory 20 20 20





Rate   


 


Blood Pressure 106/68 95/65 95/65





(mmHg)   


 


O2 Sat by Pulse 99 100 100





Oximetry   











Oxygen Devices in Use Now: None


Appearance: morbidly obese male laying in bed resting with eyes closed; awkes 

easily to voice, A+O x3 in NAD.


Eyes: No Scleral Icterus, PERRLA


Ears/Nose/Mouth/Throat: Mucous Membranes Moist, - - poor dentition 


Respiratory: Symmetrical Chest Expansion and Respiratory Effort, Clear to 

Auscultation


Cardiovascular: RRR, - - 3+ LE edema noted


Abdominal: - - morbidly obese, soft, nontender. unable to auscultate BS due to 

obesity


Neurological: Alert and Oriented x 3, NL Sensation


Lines/Tubes/Other Access: Clean, Dry and Intact Peripheral IV


Nutrition: Taking PO's


Result Diagrams: 


 10/28/17 06:35





 10/28/17 06:35


Microbiology and Other Data: 


 Microbiology











 10/25/17 16:30 Nasal Screen MRSA (PCR)(RAHUL) - Final





 Nasal    Mrsa Negative














Assess/Plan/Problems-Billing


Assessment: 





Patient is a 61yo male with a PMH significant for Morbid obesity, CHF, 

Paroxysmal Afib, DMII, HTN, HLD, Depression, CKD who presents after a fall 

without head trauma and prolonged laying and sepsis presumably secondary to a 

lower leg wound or a UTI, also with mild rhabdomyolysis, Prerenal JOSHUA, and 

NSTEMI presumably from demand ischemia. 





- Patient Problems


(1) Cellulitis


Comment: 


Presumable cause of sepsis. Chronic leg wound. Follows with the wound clinic. 


Appreciate ID input, Clindamycin ordered. 


Continue probiotic.


Appreciate wound care consult. Keep legs elevated to decrease edema and aid 

healing. 


   





(2) JOSHUA (acute kidney injury)


Comment: 


Prerenal by FeNa, dehydrated on admission. Creatinine still increasing (mildly)

. Lasix on hold. Recheck in am   





(3) Elevated troponin


Comment: 


Trop peaked at 0.40. Suspect secondary to demand ischemia. No CP, SOB, or EKG 

changes. Consider outpatient stress test.   





(4) Afib


Comment: 


Eliquis on hold due to renal function


Continue BB w/ hold parameters   





(5) CHF (congestive heart failure)


Comment: 


No SOB; increased LE edema


EF 40-45 by most recent Echo, Consistent with 11/16 TTE 


Hold Lasix today and re-evaluate tomorrow. 


   





(6) Diabetes mellitus


Comment: 


FSBG and SSI.   





(7) HLD (hyperlipidemia)


Comment: 


continue simvastatin    





(8) HTN (hypertension)


Comment: 


Borderline hypotension on metoprolol for rate control with hold parameters   





(9) DVT prophylaxis


Comment: HSQ   


Status and Disposition: 








 inpatient. LOS > 2 days. Discharge when medically stable.

## 2017-10-29 LAB
ADD DIFF/SLIDE REVIEW?: (no result)
ANION GAP SERPL CALC-SCNC: 10 MMOL/L (ref 2–11)
BUN SERPL-MCNC: 80 MG/DL (ref 6–24)
BUN/CREAT SERPL: 24 (ref 8–20)
CALCIUM SERPL-MCNC: 9.3 MG/DL (ref 8.6–10.3)
CHLORIDE SERPL-SCNC: 99 MMOL/L (ref 101–111)
GLUCOSE SERPL-MCNC: 86 MG/DL (ref 70–100)
HCO3 SERPL-SCNC: 20 MMOL/L (ref 22–32)
HCT VFR BLD AUTO: 36 % (ref 42–52)
HGB BLD-MCNC: 11.6 G/DL (ref 14–18)
MCH RBC QN AUTO: 29 PG (ref 27–31)
MCHC RBC AUTO-ENTMCNC: 32 G/DL (ref 31–36)
MCV RBC AUTO: 90 FL (ref 80–94)
POLYCHROMASIA BLD QL SMEAR: (no result)
POTASSIUM SERPL-SCNC: 5.2 MMOL/L (ref 3.5–5)
RBC # BLD AUTO: 4.03 10^6/UL (ref 4–5.4)
SODIUM SERPL-SCNC: 129 MMOL/L (ref 133–145)
WBC # BLD AUTO: 12.1 10^3/UL (ref 3.5–10.8)

## 2017-10-29 RX ADMIN — Medication SCH CAP: at 10:11

## 2017-10-29 RX ADMIN — INSULIN LISPRO SCH: 100 INJECTION, SOLUTION INTRAVENOUS; SUBCUTANEOUS at 16:36

## 2017-10-29 RX ADMIN — BENZONATATE SCH MG: 100 CAPSULE ORAL at 10:11

## 2017-10-29 RX ADMIN — BENZONATATE SCH MG: 100 CAPSULE ORAL at 13:54

## 2017-10-29 RX ADMIN — HEPARIN SODIUM SCH UNITS: 5000 INJECTION INTRAVENOUS; SUBCUTANEOUS at 21:53

## 2017-10-29 RX ADMIN — INSULIN LISPRO SCH: 100 INJECTION, SOLUTION INTRAVENOUS; SUBCUTANEOUS at 12:32

## 2017-10-29 RX ADMIN — METOPROLOL TARTRATE SCH: 25 TABLET, FILM COATED ORAL at 21:58

## 2017-10-29 RX ADMIN — BENZONATATE SCH MG: 100 CAPSULE ORAL at 21:53

## 2017-10-29 RX ADMIN — BENZONATATE SCH MG: 100 CAPSULE ORAL at 17:33

## 2017-10-29 RX ADMIN — METOPROLOL TARTRATE SCH MG: 25 TABLET, FILM COATED ORAL at 21:52

## 2017-10-29 RX ADMIN — HEPARIN SODIUM SCH UNITS: 5000 INJECTION INTRAVENOUS; SUBCUTANEOUS at 13:54

## 2017-10-29 RX ADMIN — INSULIN LISPRO SCH: 100 INJECTION, SOLUTION INTRAVENOUS; SUBCUTANEOUS at 08:26

## 2017-10-29 RX ADMIN — ATORVASTATIN CALCIUM SCH MG: 10 TABLET, FILM COATED ORAL at 17:33

## 2017-10-29 RX ADMIN — METOPROLOL TARTRATE SCH MG: 25 TABLET, FILM COATED ORAL at 10:11

## 2017-10-29 RX ADMIN — HEPARIN SODIUM SCH UNITS: 5000 INJECTION INTRAVENOUS; SUBCUTANEOUS at 05:28

## 2017-10-29 NOTE — RAD
HISTORY: Acute renal insufficiency



COMPARISONS: None



TECHNIQUE: Multiple transverse and longitudinal ultrasound images were obtained of the

kidneys and bladder using grayscale and color Doppler imaging.



FINDINGS:



The study is limited by patient body habitus.





RIGHT KIDNEY: The right kidney is normal in shape, size, contour, and echogenicity.  There

is no hydronephrosis or nephrolithiasis. 

The right kidney measures 11.2 x 5.8 x 4.3 cm. 



LEFT KIDNEY: Evaluation of the left kidney is limited by body habitus. The left kidney is

not evaluated.



BLADDER: The bladder is smooth in contour. Ureteral jets are not identified. The prevoid

bladder volume is 244 mL. The patient is unable to void continuously.



AORTA AND IVC: No images are submitted of the vasculature.     

RETROPERITONEUM: Unremarkable.



OTHER: None.



IMPRESSION: 

1.  LIMITED STUDY.

2.  THE LEFT KIDNEY IS NOT EVALUATED.

3.  THERE IS NO RIGHT HYDRONEPHROSIS.

4.  URETERAL JETS ARE NOT IDENTIFIED.

5.  THE PREVOID BLADDER VOLUME  ML. THE PATIENT IS UNABLE TO VOID FOR CLINICALLY.

## 2017-10-29 NOTE — PN
Subjective


Date of Service: 10/29/17


Interval History: 





Mr. Blake reports he feels tired today because he hasnt been able to sleep well. 

He reports he continues to feel edematous in LE's, thighs and back. He reports 

his LE wounds has less pain and feels that it is getting better. He denies SOB 

or CP. Denies orthopnea. No fevers or chills. He reports good appetite. No N/V/

D. He reports it is his norm to "dribble when urinating" but he does think he 

has been urinating a little less than normally - he does reports he has voided 

multiple times today. No abdominal or flank pain. He denies large prostate, no 

hx of kidney stones or stents in the past. 








Family History: Unchanged from Admission


Social History: Unchanged from Admission


Past Medical History: Unchanged from Admission





Objective


Active Medications: 








Acetaminophen (Tylenol Tab*)  650 mg PO Q4H PRN


   PRN Reason: FEVER/PAIN


Atorvastatin Calcium (Lipitor*)  10 mg PO 1700 [facility]


   Last Admin: 10/28/17 16:48 Dose:  10 mg


Benzonatate (Tessalon Cap*)  100 mg PO QID [facility]


   Last Admin: 10/29/17 13:54 Dose:  100 mg


Dextrose (D50w Syringe 50 Ml*)  12.5 gm IV PUSH .FOR FS < 60 - SS PRN


   PRN Reason: FS < 60


Diphenhydramine HCl (Benadryl Po*)  25 mg PO Q6H PRN


   PRN Reason: ITCHING


   Last Admin: 10/26/17 22:02 Dose:  25 mg


Heparin Sodium (Porcine) (Heparin Vial(*))  5,000 units SUBCUT Q8HR [facility]


   Last Admin: 10/29/17 13:54 Dose:  5,000 units


Hydroxyzine HCl (Atarax Tab*)  25 mg PO Q4H PRN


   PRN Reason: PRURITIS


Clindamycin HCl/Dextrose (Cleocin 600 Mg Ivpremix(*) Sdv)  600 mg in 50 mls @ 

100 mls/hr IV Q8H [facility]


   Last Admin: 10/29/17 13:54 Dose:  100 mls/hr


Insulin Human Lispro (Humalog*)  0 units SUBCUT AC [facility]


   PRN Reason: Protocol


   Last Admin: 10/29/17 12:32 Dose:  Not Given


Lactobacillus Rhamnosus (Culturelle*)  1 cap PO DAILY [facility]


   Last Admin: 10/29/17 10:11 Dose:  1 cap


Metoprolol Tartrate (Lopressor Tab*)  25 mg PO BID [facility]


   Last Admin: 10/29/17 10:11 Dose:  25 mg


Ondansetron HCl (Zofran Inj*)  4 mg IV Q6H PRN


   PRN Reason: NAUSEA








 Vital Signs











  10/28/17 10/28/17 10/28/17





  20:00 20:05 23:07


 


Temperature  97.3 F 97.3 F


 


Pulse Rate  87 85


 


Respiratory 18 18 22





Rate   


 


Blood Pressure  103/65 100/71





(mmHg)   


 


O2 Sat by Pulse  97 96





Oximetry   














  10/28/17 10/29/17 10/29/17





  23:32 03:11 04:00


 


Temperature  97.3 F 


 


Pulse Rate  85 86


 


Respiratory  24 





Rate   


 


Blood Pressure  81/64 110/60





(mmHg)   


 


O2 Sat by Pulse 96 93 95





Oximetry   














  10/29/17 10/29/17 10/29/17





  08:00 08:03 11:33


 


Temperature  97.3 F 97.4 F


 


Pulse Rate  84 82


 


Respiratory 18 20 20





Rate   


 


Blood Pressure  103/65 106/70





(mmHg)   


 


O2 Sat by Pulse 97 97 96





Oximetry   














  10/29/17 10/29/17 10/29/17





  15:34 15:43 16:00


 


Temperature  97.0 F 


 


Pulse Rate 81  


 


Respiratory 16  





Rate   


 


Blood Pressure 95/59  





(mmHg)   


 


O2 Sat by Pulse 94  94





Oximetry   











Oxygen Devices in Use Now: None


Appearance: super morbid obesity male A+O x3 in NAD


Eyes: No Scleral Icterus, PERRLA


Ears/Nose/Mouth/Throat: - - poor dentition 


Neck: NL Appearance and Movements; NL JVP


Respiratory: Symmetrical Chest Expansion and Respiratory Effort, Clear to 

Auscultation


Cardiovascular: RRR, -


Abdominal: - - obese, soft, nontender


Extremities: No Edema - 3+


Neurological: Alert and Oriented x 3


Lines/Tubes/Other Access: Clean, Dry and Intact Peripheral IV


Result Diagrams: 


 10/29/17 06:40





 10/29/17 06:39


Microbiology and Other Data: 


 Microbiology











 10/25/17 16:30 Nasal Screen MRSA (PCR)(RAHUL) - Final





 Nasal    Mrsa Negative














Assess/Plan/Problems-Billing


Assessment: 





Patient is a 61yo male with a PMH significant for Morbid obesity, CHF, 

Paroxysmal Afib, DMII, HTN, HLD, Depression, CKD who presents after a fall 

without head trauma and prolonged laying and sepsis presumably secondary to a 

lower leg wound or a UTI, also with mild rhabdomyolysis, Prerenal JOSHUA, and 

elevated troponins





- Patient Problems


(1) Cellulitis


Comment: 


Presumable cause of sepsis. Chronic leg wound. Follows with the wound clinic. 


Appreciate ID input, Clindamycin ordered. 


Continue probiotic.


Appreciate wound care consult.  


   





(2) JOSHUA (acute kidney injury)


Comment: 


Prerenal by FeNa on admission, plan to recheck FeNa now.  Creatinine still 

increasing. Low urine output. Place davis. Check renal u/s. ace wraps to LE b/l


Lasix on hold (lasix ordered yesterday & today in MAR was NOT given)


Pre-albumin <3 - third spacing fluids. 


Dr. Hernandez to consult tomorrow


Strict I+Os   





(3) Elevated troponin


Comment: 


Trop peaked at 0.40. Suspect secondary to demand ischemia. No CP, SOB, or EKG 

changes. Consider outpatient stress test.   





(4) Afib


Comment: 


Eliquis on hold due to renal function -was on hepariin gtt first few days of 

admission stopped 10/28 due to no need to bridge for afib. 


Continue BB w/ hold parameters   





(5) CHF (congestive heart failure)


Comment: 


No SOB; increased LE edema


EF 40-45 by most recent Echo, Consistent with 11/16 TTE 


Hold Lasix today


Continue metoprolol 


   





(6) Diabetes mellitus


Comment: 


controlled. FSBG and SSI.   





(7) HLD (hyperlipidemia)


Comment: 


continue simvastatin    





(8) HTN (hypertension)


Comment: 


metoprolol for rate control with hold parameters   





(9) DVT prophylaxis


Comment: HSQ   


Status and Disposition: 








 inpatient. LOS > 2 days. Discharge when medically stable.

## 2017-10-30 LAB
ANION GAP SERPL CALC-SCNC: 9 MMOL/L (ref 2–11)
BUN SERPL-MCNC: 84 MG/DL (ref 6–24)
BUN/CREAT SERPL: 22.5 (ref 8–20)
CALCIUM SERPL-MCNC: 9.5 MG/DL (ref 8.6–10.3)
CHLORIDE SERPL-SCNC: 97 MMOL/L (ref 101–111)
GLUCOSE SERPL-MCNC: 79 MG/DL (ref 70–100)
HCO3 SERPL-SCNC: 21 MMOL/L (ref 22–32)
HCT VFR BLD AUTO: 37 % (ref 42–52)
HGB BLD-MCNC: 12.2 G/DL (ref 14–18)
MAGNESIUM SERPL-MCNC: 2.4 MG/DL (ref 1.9–2.7)
MCH RBC QN AUTO: 29 PG (ref 27–31)
MCHC RBC AUTO-ENTMCNC: 33 G/DL (ref 31–36)
MCV RBC AUTO: 90 FL (ref 80–94)
POTASSIUM SERPL-SCNC: 5.4 MMOL/L (ref 3.5–5)
RBC # BLD AUTO: 4.17 10^6/UL (ref 4–5.4)
SODIUM SERPL-SCNC: 127 MMOL/L (ref 133–145)
SODIUM UR-SCNC: < 18 MMOL/L
WBC # BLD AUTO: 12.8 10^3/UL (ref 3.5–10.8)

## 2017-10-30 PROCEDURE — 0T9B70Z DRAINAGE OF BLADDER WITH DRAINAGE DEVICE, VIA NATURAL OR ARTIFICIAL OPENING: ICD-10-PCS | Performed by: UROLOGY

## 2017-10-30 RX ADMIN — INSULIN LISPRO SCH: 100 INJECTION, SOLUTION INTRAVENOUS; SUBCUTANEOUS at 17:55

## 2017-10-30 RX ADMIN — BENZONATATE SCH MG: 100 CAPSULE ORAL at 09:13

## 2017-10-30 RX ADMIN — METOPROLOL TARTRATE SCH MG: 25 TABLET, FILM COATED ORAL at 09:13

## 2017-10-30 RX ADMIN — BENZONATATE SCH MG: 100 CAPSULE ORAL at 18:07

## 2017-10-30 RX ADMIN — BENZONATATE SCH MG: 100 CAPSULE ORAL at 11:22

## 2017-10-30 RX ADMIN — METOPROLOL TARTRATE SCH: 25 TABLET, FILM COATED ORAL at 22:56

## 2017-10-30 RX ADMIN — HEPARIN SODIUM SCH UNITS: 5000 INJECTION INTRAVENOUS; SUBCUTANEOUS at 13:33

## 2017-10-30 RX ADMIN — HEPARIN SODIUM SCH UNITS: 5000 INJECTION INTRAVENOUS; SUBCUTANEOUS at 06:13

## 2017-10-30 RX ADMIN — DIPHENHYDRAMINE HYDROCHLORIDE PRN MG: 25 CAPSULE ORAL at 04:07

## 2017-10-30 RX ADMIN — INSULIN LISPRO SCH: 100 INJECTION, SOLUTION INTRAVENOUS; SUBCUTANEOUS at 11:45

## 2017-10-30 RX ADMIN — INSULIN LISPRO SCH: 100 INJECTION, SOLUTION INTRAVENOUS; SUBCUTANEOUS at 09:12

## 2017-10-30 RX ADMIN — BENZONATATE SCH MG: 100 CAPSULE ORAL at 21:56

## 2017-10-30 RX ADMIN — ATORVASTATIN CALCIUM SCH MG: 10 TABLET, FILM COATED ORAL at 18:07

## 2017-10-30 RX ADMIN — HEPARIN SODIUM SCH UNITS: 5000 INJECTION INTRAVENOUS; SUBCUTANEOUS at 21:56

## 2017-10-30 RX ADMIN — Medication SCH CAP: at 09:13

## 2017-10-30 NOTE — PN
Subjective


Date of Service: 10/30/17


Interval History: 





Patient seen and examined at bedside.  Patient reports no good urination 

overnight.  Patient denies shortness of breath.  Primarily c/o of pain from 

swollen scrotum. Per records patient 7 lbs up from admission weight. Patient 

states he has not been up out of bed yet.


Family History: Unchanged from Admission


Social History: Unchanged from Admission


Past Medical History: Unchanged from Admission





Objective


Active Medications: 








Acetaminophen (Tylenol Tab*)  650 mg PO Q4H PRN


Atorvastatin Calcium (Lipitor*)  10 mg PO 1700 [facility]


Benzonatate (Tessalon Cap*)  100 mg PO QID [facility]


Diphenhydramine HCl (Benadryl Po*)  25 mg PO Q6H PRN


Heparin Sodium (Porcine) (Heparin Vial(*))  5,000 units SUBCUT Q8HR [facility]


Hydroxyzine HCl (Atarax Tab*)  25 mg PO Q4H PRN


Clindamycin HCl/Dextrose (Cleocin 600 Mg Ivpremix(*) Sdv)  600 mg in 50 mls @ 

100 mls/hr IV Q8H [facility]


Insulin Human Lispro (Humalog*)  0 units SUBCUT AC [facility]


Lactobacillus Rhamnosus (Culturelle*)  1 cap PO DAILY [facility]


Metoprolol Tartrate (Lopressor Tab*)  25 mg PO BID [facility]


Ondansetron HCl (Zofran Inj*)  4 mg IV Q6H PRN














 Vital Signs











Temp Pulse Resp BP Pulse Ox


 


 97.8 F   81   20   101/70   95 


 


 10/30/17 12:04  10/30/17 12:04  10/30/17 12:04  10/30/17 12:04  10/30/17 12:04











Oxygen Devices in Use Now: None


Appearance: morbidly obese male, laying in bed NAD


Eyes: No Scleral Icterus, PERRLA


Ears/Nose/Mouth/Throat: NL Teeth, Lips, Gums, Mucous Membranes Moist


Neck: NL Appearance and Movements; NL JVP


Respiratory: Symmetrical Chest Expansion and Respiratory Effort, Clear to 

Auscultation


Cardiovascular: NL Sounds; No Murmurs; No JVD, RRR


Abdominal: NL Sounds; No Tenderness; No Distention


Extremities: - - significant scrotal edema; 3-4+ edema through legs and thighs


Neurological: Alert and Oriented x 3, NL Muscle Strength and Tone


Lines/Tubes/Other Access: Clean, Dry and Intact Peripheral IV


Nutrition: Taking PO's


Result Diagrams: 


 10/30/17 09:31





 10/30/17 09:31


Microbiology and Other Data: 


.





Assess/Plan/Problems-Billing


Patient is a 63yo male with a PMH significant for Morbid obesity, CHF, 

Paroxysmal Afib, DMII, HTN, HLD, Depression, CKD who presents after a fall 

without head trauma and prolonged laying and sepsis presumably secondary to a 

lower leg wound or a UTI, also with mild rhabdomyolysis, Prerenal JOSHUA, and 

elevated troponins now with significant urinary retention and/or acute renal 

failure.





- Patient Problems


(1) JOSHUA (acute kidney injury)


Comment: Renal ultrasound non-diagnostic and urine output has tapered off to 

dribbling.  Asked urology to insert catheter as bladder scan >390. Once davis 

inserted, will be have better sense of cause of renal failure.  If poor urine 

output then more likely pre-renal or ATN.  Send urine lytes after insertion.  

Nephrology to see. Strict I/O. Lasix on hold.


   





(2) Cellulitis


Comment: Presumable cause of sepsis. Chronic leg wound. Follows with the wound 

clinic. 


Appreciate ID input, continue Clindamycin. Continue probiotic. Appreciate wound 

care consult.  


   





(3) Elevated troponin


Comment: Trop peaked at 0.40. Suspect secondary to demand ischemia. No CP, SOB, 

or EKG changes. Consider outpatient stress test.   





(4) Afib


Comment: Eliquis on hold d/t renal function.  Continue beta blocker.  If renal 

function does not recover, would plan on starting warfarin at discharge.   





(5) CHF (congestive heart failure)


Comment: Significantly increased LE edema without increased SOB. EF 40-45 by 

most recent Echo, Consistent with 11/16 TTE. Continue metoprolol.  depending on 

urine output after davis insertion will determine further Lasix dosing.   





(6) Diabetes mellitus


Comment: Controlled. FSBG and SSI.   





(7) HLD (hyperlipidemia)


Comment: Continue simvastatin    





(8) HTN (hypertension)


Comment: Controlled with metoprolol.   





(9) DVT prophylaxis


Comment: HSQ   





(10) Full code status





Status and Disposition: 


Inpatient. LOS > 2 days. Discharge when medically stable.

## 2017-10-30 NOTE — CONS
CONSULTATION NOTE:

 

DATE OF CONSULTATION:  10/30/17

 

PROCEDURE:  Complex placement of Vega catheter (16 Fr silastic Catheter).

 

REASON FOR CONSULTATION:  I was asked by the hospitalist service to see this 62
- year-old white male with renal failure and suspected urinary retention.

 

HISTORY OF PRESENT ILLNESS:  Mr. Blake is a morbidly obese 62-year-old who weighs 
about 450 pounds and who was transferred from [facility] because of 
renal failure, congestive heart failure, atrial fibrillation, and diffuse 
anasarca and ascites.

 

He had worsening of his renal function.  He had a bladder ultrasound, which 
showed suspected large urinary retention.

 

Upon questioning, the patient reports having nocturia only once or twice and 
good urinary stream. He does not feel he is in urinary retention.

 

He recalls that in the past when when was admitted to the [facility], and 
to [facility], he had required Vega catheter placement; however, the 
procedure had to be done in the endoscopy suite with a flexible cystoscopy 
because the urethral meatus could not be visualized to be catheterized.

 

A  consultation is being requested for catheter placement.

 

On  exam, he is a very morbidly obese white male.  He has diffuse edema 
involving his lower extremities, the scrotum, and the foreskin.  I could not 
perform a rectal exam to check on his prostate size.  There was marked edema of 
the scrotal wall and the testes could not be palpated.

 

The patient is not circumcised.  There is diffuse edema of the foreskin but no 
phimosis.  The urethral meatus was palpated at about 10 cm from the level of 
the skin.

 

PROCEDURE:  The patient was prepped for a urethral catheterization.  A 16 
Silastic catheter was then passed inside the foreskin.  By digital palpation 
and guidance, and after several attempts, the urethral meatus was catheterized 
and the catheter was successfully introduced inside the bladder and the balloon 
inflated with 10 cc of water.  There was only a small amount of concentrated 
looking urine drained from his bladder.

 

I do not think the patient is in urinary retention.  I think what was noted on 
the ultrasound was ascites and not urine in his bladder.

 

The plan is to keep the Vega catheter in place for urine output monitoring.  I 
do recommend that the catheter stays in place until the monitoring of urine 
output is not needed anymore and then it can be discontinued.  I am not sure if 
I will be again successful at placing a Vega catheter at the bedside if he 
needs another catheterization.

 

 [id number]/[facility] #: [id number]

MARIBEL

## 2017-10-31 LAB
ANION GAP SERPL CALC-SCNC: 13 MMOL/L (ref 2–11)
BUN SERPL-MCNC: 88 MG/DL (ref 6–24)
BUN/CREAT SERPL: 22.7 (ref 8–20)
CALCIUM SERPL-MCNC: 9.2 MG/DL (ref 8.6–10.3)
CHLORIDE SERPL-SCNC: 100 MMOL/L (ref 101–111)
GLUCOSE SERPL-MCNC: 93 MG/DL (ref 70–100)
HCO3 BLDV-SCNC: 18 MMOL/L (ref 24–28)
HCO3 BLDV-SCNC: 18.3 MMOL/L (ref 24–28)
HCO3 SERPL-SCNC: 14 MMOL/L (ref 22–32)
HCT VFR BLD AUTO: 38 % (ref 42–52)
HGB BLD-MCNC: 11.9 G/DL (ref 14–18)
MCH RBC QN AUTO: 28 PG (ref 27–31)
MCHC RBC AUTO-ENTMCNC: 32 G/DL (ref 31–36)
MCV RBC AUTO: 90 FL (ref 80–94)
POTASSIUM SERPL-SCNC: (no result) MMOL/L (ref 3.5–5)
RBC # BLD AUTO: 4.18 10^6/UL (ref 4–5.4)
SODIUM SERPL-SCNC: 127 MMOL/L (ref 133–145)
WBC # BLD AUTO: 12.4 10^3/UL (ref 3.5–10.8)

## 2017-10-31 RX ADMIN — ACETAMINOPHEN PRN MG: 325 TABLET ORAL at 17:46

## 2017-10-31 RX ADMIN — BENZONATATE SCH MG: 100 CAPSULE ORAL at 17:48

## 2017-10-31 RX ADMIN — CLINDAMYCIN HYDROCHLORIDE SCH MG: 150 CAPSULE ORAL at 14:02

## 2017-10-31 RX ADMIN — METOPROLOL TARTRATE SCH MG: 25 TABLET, FILM COATED ORAL at 21:34

## 2017-10-31 RX ADMIN — BENZONATATE SCH MG: 100 CAPSULE ORAL at 14:02

## 2017-10-31 RX ADMIN — CLINDAMYCIN HYDROCHLORIDE SCH MG: 150 CAPSULE ORAL at 23:12

## 2017-10-31 RX ADMIN — BENZONATATE SCH MG: 100 CAPSULE ORAL at 21:33

## 2017-10-31 RX ADMIN — SODIUM CITRATE AND CITRIC ACID MONOHYDRATE SCH ML: 500; 334 SOLUTION ORAL at 10:50

## 2017-10-31 RX ADMIN — ALBUMIN (HUMAN) SCH MLS/HR: 0.25 INJECTION, SOLUTION INTRAVENOUS at 17:46

## 2017-10-31 RX ADMIN — BENZONATATE SCH MG: 100 CAPSULE ORAL at 08:26

## 2017-10-31 RX ADMIN — Medication SCH CAP: at 08:27

## 2017-10-31 RX ADMIN — INSULIN LISPRO SCH: 100 INJECTION, SOLUTION INTRAVENOUS; SUBCUTANEOUS at 11:36

## 2017-10-31 RX ADMIN — ALBUMIN (HUMAN) SCH MLS/HR: 0.25 INJECTION, SOLUTION INTRAVENOUS at 21:34

## 2017-10-31 RX ADMIN — HEPARIN SODIUM SCH UNITS: 5000 INJECTION INTRAVENOUS; SUBCUTANEOUS at 14:02

## 2017-10-31 RX ADMIN — ATORVASTATIN CALCIUM SCH MG: 10 TABLET, FILM COATED ORAL at 17:48

## 2017-10-31 RX ADMIN — INSULIN LISPRO SCH: 100 INJECTION, SOLUTION INTRAVENOUS; SUBCUTANEOUS at 07:43

## 2017-10-31 RX ADMIN — SODIUM CITRATE AND CITRIC ACID MONOHYDRATE SCH ML: 500; 334 SOLUTION ORAL at 14:02

## 2017-10-31 RX ADMIN — HEPARIN SODIUM SCH UNITS: 5000 INJECTION INTRAVENOUS; SUBCUTANEOUS at 05:35

## 2017-10-31 RX ADMIN — INSULIN LISPRO SCH: 100 INJECTION, SOLUTION INTRAVENOUS; SUBCUTANEOUS at 16:12

## 2017-10-31 RX ADMIN — SODIUM CITRATE AND CITRIC ACID MONOHYDRATE SCH ML: 500; 334 SOLUTION ORAL at 21:34

## 2017-10-31 RX ADMIN — METOPROLOL TARTRATE SCH MG: 25 TABLET, FILM COATED ORAL at 08:27

## 2017-10-31 RX ADMIN — HEPARIN SODIUM SCH UNITS: 5000 INJECTION INTRAVENOUS; SUBCUTANEOUS at 23:12

## 2017-10-31 NOTE — PN
Subjective


Date of Service: 10/31/17


Interval History: 





Patient seen and examined at bedside. Patient reports continued pain and 

swelling on scrotum.  He was able to tolerate the ace wrap on L leg overnight 

but right leg hurts him because of cellulitis.  Denies any chest pain or 

shortness of breath.


Family History: Unchanged from Admission


Social History: Unchanged from Admission


Past Medical History: Unchanged from Admission





Objective


Active Medications: 








Acetaminophen (Tylenol Tab*)  650 mg PO Q4H PRN


Atorvastatin Calcium (Lipitor*)  10 mg PO 1700 ALEJANDRA


Benzonatate (Tessalon Cap*)  100 mg PO QID ALEJANDRA


Citric Acid/Sodium Citrate (Bicitra*)  15 ml PO TID AELJANDRA


Dextrose (D50w Syringe 50 Ml*)  12.5 gm IV PUSH .FOR FS < 60 - SS PRN


Diphenhydramine HCl (Benadryl Po*)  25 mg PO Q6H PRN


Heparin Sodium (Porcine) (Heparin Vial(*))  5,000 units SUBCUT Q8HR ALEJANDRA


Hydroxyzine HCl (Atarax Tab*)  25 mg PO Q4H PRN


Clindamycin HCl/Dextrose (Cleocin 600 Mg Ivpremix(*) Sdv)  600 mg in 50 mls @ 

100 mls/hr IV Q8H ALEJANDRA


Sodium Bicarbonate 150 meq/ (Dextrose)  1,150 mls @ 125 mls/hr IVPB Q9H ALEJANDRA


Insulin Human Lispro (Humalog*)  0 units SUBCUT AC ALEJANDRA


Lactobacillus Rhamnosus (Culturelle*)  1 cap PO DAILY ALEJANDRA


Metoprolol Tartrate (Lopressor Tab*)  25 mg PO BID ALEJANDRA


Ondansetron HCl (Zofran Inj*)  4 mg IV Q6H PRN





 Vital Signs











Temp Pulse Resp BP Pulse Ox


 


 97.5 F   82   20   97/64   95 


 


 10/31/17 07:10  10/31/17 07:10  10/31/17 08:00  10/31/17 07:10  10/31/17 08:00











Oxygen Devices in Use Now: None


Appearance: laying in bed, morbidly obese male, NAD


Eyes: No Scleral Icterus, PERRLA


Ears/Nose/Mouth/Throat: NL Teeth, Lips, Gums, Mucous Membranes Moist


Neck: NL Appearance and Movements; NL JVP


Respiratory: Symmetrical Chest Expansion and Respiratory Effort, Clear to 

Auscultation


Cardiovascular: NL Sounds; No Murmurs; No JVD


Abdominal: NL Sounds; No Tenderness; No Distention


Extremities: - - anasarca - abdomen and bilateral extremities; significant 

scrotal edema


Skin: - - RLE erythema improved.


Neurological: Alert and Oriented x 3


Lines/Tubes/Other Access: Clean, Dry and Intact Peripheral IV


Nutrition: Taking PO's


Result Diagrams: 


 10/31/17 05:59





 10/31/17 08:32


Microbiology and Other Data: 


.





Assess/Plan/Problems-Billing


Patient is a 63yo male with a PMH significant for Morbid obesity, CHF, 

Paroxysmal Afib, DMII, HTN, HLD, Depression, CKD who presents after a fall 

without head trauma and prolonged laying and sepsis presumably secondary to a 

lower leg wound or a UTI, also with mild rhabdomyolysis, Prerenal JOSHUA, and 

elevated troponins now with significant urinary retention and/or acute renal 

failure.





- Patient Problems


(1) JOSHUA (acute kidney injury)


Comment: Vega inserted yesterday by urology with minimal output.  Minial 

output continued overnight.  This morning patient is quite acidotic from renal 

failure.  Renal failure still pre-renal per FeNa from significant third spacing 

d/t low albumin.  Discussed with nephrology who will see later today.  For now 

start Bicitra and sodium bicarb drip.  Recheck VBG in the afternoon.  Discussed 

with patient the likelihood of dialysis in the near future.   





(2) Acidosis, metabolic


Comment: Secondary to renal failure.  Will start Bicitra and sodium bicarb 

drip.  Recheck VBG at 1600.   





(3) Cellulitis


Comment: Presumable cause of sepsis. Chronic leg wound. Follows with the wound 

clinic. 


Appreciate ID input, continue Clindamycin. Continue probiotic. Appreciate wound 

care consult.     





(4) Elevated troponin


Comment: Trop peaked at 0.40. Suspect secondary to demand ischemia. No CP, SOB, 

or EKG changes. Consider outpatient stress test.   





(5) Afib


Comment: Eliquis on hold d/t renal function.  Continue beta blocker.  If renal 

function does not recover, would plan on starting warfarin at discharge.   





(6) CHF (congestive heart failure)


Comment: Significantly increased LE edema without increased SOB. EF 40-45 by 

most recent Echo, Consistent with 11/16 TTE. Continue metoprolol.  No further 

Lasix.   





(7) Diabetes mellitus


Comment: Controlled. FSBG and SSI.   





(8) HLD (hyperlipidemia)


Comment: Continue simvastatin    





(9) HTN (hypertension)


Comment: Controlled with metoprolol.   





(10) DVT prophylaxis


Comment: HSQ   





(11) Full code status





Status and Disposition: 


Inpatient. LOS > 2 days. Discharge when medically stable.

## 2017-10-31 NOTE — CONS
CC:  Julianna Cline *

 

NEPHROLOGY CONSULTATION:

 

DATE OF CONSULT:  10/31/17

 

HISTORY OF PRESENT ILLNESS:  Mr. Blake is a 62-year-old gentleman with a 20-year 
history of diabetes mellitus, type 2, which has been complicated by neuropathy, 
but not nephropathy.  He fell out of his wheelchair prior to admission and 
could not get up.  He lied on the floor all night long.  Eventually, neighbors 
were able to hear him and he was brought to the hospital.  Here in the hospital
, he has been found to have deteriorating renal function, which precipitated 
this consultation. At the present time, he says he feels pretty good except for 
the swelling.  He denies shortness of breath, chest pain, nausea, and vomiting.

 

PAST MEDICAL HISTORY:  His previous medical history is significant for the 
previously mentioned diabetes mellitus, type 2.  He has history of congestive 
heart failure.  He has a history of atrial fibrillation and has been on 
Eliquis.  He has a history of hypertension, hyperlipidemia, depression, and 
irritable bowel syndrome.  He has a history of MRSA infection.  He has a 
history of diverticulitis. He apparently required a colostomy for sometime as a 
result of diverticular abscess.

 

MEDICATIONS:  His medications at the time of admission include:

1.  Lasix 40 mg daily.

2.  Magnesium oxide 400 mg twice a day.

3.  Simvastatin 40 mg daily.

4.  Metoprolol 50 mg daily.

5.  Eliquis 5 mg twice a day.

 

ALLERGIES:  He is allergic to KEFLEX, PENICILLIN, XARELTO, WARFARIN, and 
BACTRIM.

 

FAMILY HISTORY:  Unremarkable.

 

SOCIAL HISTORY:  He lives by himself.  He used to be a heavy drinker, but he 
quit some years ago.  He previously was a smoker, but having quit that too.

 

REVIEW OF SYSTEMS:  No visual disturbances.  No hearing problems.  No sore 
throat. No swallowing difficulties.  No changes in his bowel habits.  No dysuria
, frequency, or urgency.

 

PHYSICAL EXAM:  He is a grossly obese white gentleman, who appears to be quite 
comfortable lying at about 15 degrees of elevation.  Blood pressure is 101/57 
with a pulse of 84, respirations are 24.  Extraocular muscles are intact.  He 
is anicteric.  His mucous membranes are moist.  I cannot see his neck veins as 
his neck is quite _____.  I was unable to hear any breath sounds and in fact I 
was not able to hear his heart tones.  His abdomen is grossly distended.  He 
has a scar of his previous surgery.  He has massive edema to his extremities 
extending all the way up to the lower abdominal wall.  His scrotum is massively 
edematous.

 

LABORATORY DATA:  Review of his laboratory studies reveals a white count of 12.4
, hemoglobin of 11.9, hematocrit of 38, platelet count of 347,000.  INR of 
2.18. Sodium 127, potassium 5.5, chloride 100, total CO2 14, and that has been 
progressively declining.  It was down at 15 on admission, but came back up into 
the low 20s.  BUN of 88, that was 45 on presentation.  His creatinine is 3.88 
up from 1.7 on admission.  His iron saturation is 5%.  Total iron is less than 
15.  His albumin is 2.7; it was 1.7 on presentation.  His prealbumin is less 
than 3. Urinalysis reveals 1+ blood, positive urobilinogen, 2+ esterase, 3+ wbc'
s, 2+ rbc's.  His urine culture revealed less than 10,000 colonies of Proteus 
mirabilis. On previous set of blood cultures, these were negative.

 

IMPRESSION AND PLAN:  Acute on chronic renal insufficiency.  He has had a 
depressed fractional excretion of sodium suggesting a significant prerenal state
, which is not surprising considering his markedly depressed serum albumin and 
his history of congestive heart failure.  It is going to be very difficult to 
diurese him until we are able to improve his intravascular plasma volume.  I 
suggest we start him on an albumin infusion.  The application of Ace wraps to 
his legs has already been undertaken.  We probably should replace his iron 
deficiency.  It will be appropriate to get some stools for occult blood.  It 
may be necessary to actually dialyze him while he is on an albumin infusion.  I 
have explained the risks of dialysis to him including bleeding, infection, 
fluid shift, etc., and he is willing to proceed if required.

 

 [id number]/[facility] #: [id number]

[facility]

## 2017-11-01 LAB
ALBUMIN SERPL BCG-MCNC: 3.1 G/DL (ref 3.2–5.2)
ANION GAP SERPL CALC-SCNC: 11 MMOL/L (ref 2–11)
BUN SERPL-MCNC: 90 MG/DL (ref 6–24)
BUN/CREAT SERPL: 21.9 (ref 8–20)
CALCIUM SERPL-MCNC: 9.3 MG/DL (ref 8.6–10.3)
CHLORIDE SERPL-SCNC: 98 MMOL/L (ref 101–111)
FRACT EXCRET NA UR+SERPL-RTO: 0.24 %
GLUCOSE SERPL-MCNC: 105 MG/DL (ref 70–100)
HCO3 SERPL-SCNC: 22 MMOL/L (ref 22–32)
POTASSIUM SERPL-SCNC: 5 MMOL/L (ref 3.5–5)
PREALB SERPL-MCNC: < 3 MG/DL (ref 18–38)
SODIUM SERPL-SCNC: 131 MMOL/L (ref 133–145)
SODIUM UR-SCNC: < 18 MMOL/L

## 2017-11-01 RX ADMIN — METOPROLOL TARTRATE SCH MG: 25 TABLET, FILM COATED ORAL at 20:44

## 2017-11-01 RX ADMIN — METOPROLOL TARTRATE SCH MG: 25 TABLET, FILM COATED ORAL at 08:09

## 2017-11-01 RX ADMIN — INSULIN LISPRO SCH: 100 INJECTION, SOLUTION INTRAVENOUS; SUBCUTANEOUS at 16:57

## 2017-11-01 RX ADMIN — ALBUMIN (HUMAN) SCH MLS/HR: 0.25 INJECTION, SOLUTION INTRAVENOUS at 11:16

## 2017-11-01 RX ADMIN — ONDANSETRON PRN MG: 2 INJECTION INTRAMUSCULAR; INTRAVENOUS at 18:03

## 2017-11-01 RX ADMIN — ALBUMIN (HUMAN) SCH MLS/HR: 0.25 INJECTION, SOLUTION INTRAVENOUS at 15:09

## 2017-11-01 RX ADMIN — INSULIN LISPRO SCH: 100 INJECTION, SOLUTION INTRAVENOUS; SUBCUTANEOUS at 11:52

## 2017-11-01 RX ADMIN — ALBUMIN (HUMAN) SCH: 0.25 INJECTION, SOLUTION INTRAVENOUS at 10:54

## 2017-11-01 RX ADMIN — CLINDAMYCIN HYDROCHLORIDE SCH MG: 150 CAPSULE ORAL at 06:31

## 2017-11-01 RX ADMIN — SODIUM CITRATE AND CITRIC ACID MONOHYDRATE SCH ML: 500; 334 SOLUTION ORAL at 20:43

## 2017-11-01 RX ADMIN — ALBUMIN (HUMAN) SCH: 0.25 INJECTION, SOLUTION INTRAVENOUS at 20:43

## 2017-11-01 RX ADMIN — HEPARIN SODIUM SCH UNITS: 5000 INJECTION INTRAVENOUS; SUBCUTANEOUS at 20:59

## 2017-11-01 RX ADMIN — CLINDAMYCIN HYDROCHLORIDE SCH MG: 150 CAPSULE ORAL at 13:54

## 2017-11-01 RX ADMIN — SODIUM CITRATE AND CITRIC ACID MONOHYDRATE SCH ML: 500; 334 SOLUTION ORAL at 08:09

## 2017-11-01 RX ADMIN — HEPARIN SODIUM SCH UNITS: 5000 INJECTION INTRAVENOUS; SUBCUTANEOUS at 06:31

## 2017-11-01 RX ADMIN — ALBUMIN (HUMAN) SCH MLS/HR: 0.25 INJECTION, SOLUTION INTRAVENOUS at 22:06

## 2017-11-01 RX ADMIN — Medication SCH CAP: at 08:09

## 2017-11-01 RX ADMIN — HEPARIN SODIUM SCH UNITS: 5000 INJECTION INTRAVENOUS; SUBCUTANEOUS at 13:55

## 2017-11-01 RX ADMIN — BENZONATATE SCH MG: 100 CAPSULE ORAL at 08:09

## 2017-11-01 RX ADMIN — BENZONATATE SCH MG: 100 CAPSULE ORAL at 13:54

## 2017-11-01 RX ADMIN — BENZONATATE SCH MG: 100 CAPSULE ORAL at 20:44

## 2017-11-01 RX ADMIN — ALBUMIN (HUMAN) SCH MLS/HR: 0.25 INJECTION, SOLUTION INTRAVENOUS at 01:58

## 2017-11-01 RX ADMIN — ALBUMIN (HUMAN) SCH MLS/HR: 0.25 INJECTION, SOLUTION INTRAVENOUS at 06:22

## 2017-11-01 RX ADMIN — BENZONATATE SCH MG: 100 CAPSULE ORAL at 18:02

## 2017-11-01 RX ADMIN — CLINDAMYCIN HYDROCHLORIDE SCH MG: 150 CAPSULE ORAL at 20:44

## 2017-11-01 RX ADMIN — ONDANSETRON PRN MG: 2 INJECTION INTRAMUSCULAR; INTRAVENOUS at 11:21

## 2017-11-01 RX ADMIN — SODIUM CITRATE AND CITRIC ACID MONOHYDRATE SCH ML: 500; 334 SOLUTION ORAL at 13:54

## 2017-11-01 RX ADMIN — ATORVASTATIN CALCIUM SCH MG: 10 TABLET, FILM COATED ORAL at 18:02

## 2017-11-01 RX ADMIN — INSULIN LISPRO SCH UNITS: 100 INJECTION, SOLUTION INTRAVENOUS; SUBCUTANEOUS at 08:09

## 2017-11-01 NOTE — RAD
INDICATION: Dyspnea



COMPARISON: Chest x-ray dated October 25, 2017

 

TECHNIQUE: Single AP portable view of the chest was obtained.



FINDINGS: 



Image quality is compromised due to the relative inferiority of a portable chest x-ray as

well as by the patient's large body habitus.



Similar the prior chest x-ray there appears to be cardiomegaly. The right lung is

adequately aerated. There is density obscuring the left lung base.



IMPRESSION:  Density obscuring the left lung base could be consolidation or effusion.

There appears to be persistent mild cardiomegaly. The value of this portable chest x-ray

is severely limited due to the inherent inferiority a portable chest x-ray and the

patient's large body habitus reducing x-ray penetration.

## 2017-11-01 NOTE — PN
Hospitalist Progress Note


HOSPITALIST ADDENDUM:


FeNA came back at 0.24; D/w Nephrology who recommended the following:





Plan:


1) Give annother 25g of albumin and to increase the drip to 4g/hr.   


2) Recheck FeNa in the AM.  The goal is for a FeNa 0.8 or higher. 


2) If FeNa at that level given 5mg of metolazone and 120mg IV Lasix


3) If patient's respiratory status stars to decline - give diuretics as 

delineated above.





Dr. Hernandez in agreement.

## 2017-11-01 NOTE — PN
Subjective


Date of Service: 11/01/17


Interval History: 


Patient seen and examined at bedside.  Patient reports continued scrotal pain. 

He states this is why he could not work with PT.  D/w patient that without 

mobility skin will breakdown and this could cause infection in addition to the 

need for short term rehab. Patient denies SOB or labored breathing.  He had 

some nausea this AM but tolerating POs without difficulty.


Family History: Unchanged from Admission


Social History: Unchanged from Admission


Past Medical History: Unchanged from Admission





Objective


Active Medications: 








Acetaminophen (Tylenol Tab*)  650 mg PO Q4H PRN


Atorvastatin Calcium (Lipitor*)  10 mg PO 1700 ALEJANDRA


Benzonatate (Tessalon Cap*)  100 mg PO QID ALEJANDRA


Citric Acid/Sodium Citrate (Bicitra*)  15 ml PO TID ALEJANDRA


Clindamycin HCl (Cleocin Cap*)  300 mg PO Q8HR ALEJANDRA


Diphenhydramine HCl (Benadryl Po*)  25 mg PO Q6H PRN


Heparin Sodium (Porcine) (Heparin Vial(*))  5,000 units SUBCUT Q8HR ALEJANDRA


Hydroxyzine HCl (Atarax Tab*)  25 mg PO Q4H PRN


Albumin Human 50 ml/ IV (Solution)  50 mls @ 12 mls/hr IV Q4H ALEJANDRA


Insulin Human Lispro (Humalog*)  0 units SUBCUT AC ALEJANDRA


Lactobacillus Rhamnosus (Culturelle*)  1 cap PO DAILY ALEJANDRA


Metoprolol Tartrate (Lopressor Tab*)  25 mg PO BID ALEJANDRA


Ondansetron HCl (Zofran Inj*)  4 mg IV Q6H PRN











.


Oxygen Devices in Use Now: None


Appearance: laying in bed, NAD


Eyes: No Scleral Icterus, PERRLA


Ears/Nose/Mouth/Throat: NL Teeth, Lips, Gums


Neck: NL Appearance and Movements; NL JVP


Respiratory: Symmetrical Chest Expansion and Respiratory Effort, - - lungs 

sounds distant and difficult to hear. clear on the right.


Cardiovascular: NL Sounds; No Murmurs; No JVD, - - hear sounds distant; total 

body anasarca.


Abdominal: NL Sounds; No Tenderness; No Distention


Extremities: - - significant total body anasarca; scrotal edema.


Skin: - - LLE cellulitis improved.


Neurological: Alert and Oriented x 3


Lines/Tubes/Other Access: Clean, Dry and Intact Peripheral IV


Nutrition: Taking PO's


Result Diagrams: 


 10/31/17 05:59





 11/01/17 06:44


Microbiology and Other Data: 


.





Assess/Plan/Problems-Billing


Patient is a 63yo male with a PMH significant for Morbid obesity, CHF, 

Paroxysmal Afib, DMII, HTN, HLD, Depression, CKD who presents after a fall 

without head trauma and prolonged laying and sepsis presumably secondary to a 

lower leg wound or a UTI, also with mild rhabdomyolysis, Prerenal JOSHUA, and 

elevated troponins now with significant urinary retention and/or acute renal 

failure.





- Patient Problems


(1) JOSHUA (acute kidney injury)


Comment: Appreciate nephrology input.  Albumin 50mg given as bolus yesterday 

and started on an albumin drip.  Urine output seems to have improved yet Cr 

rising.  Will recheck FeNa and if it is 0.8 or greater will try high dose Lasix 

with metolazone.  Continue strict I/O and recheck Cr in the AM. Discussed with 

patient gravity of the situation and that he may likely need dialysis in the 

near future.   





(2) Acidosis, metabolic


Comment: Improved.  D/c bicarb drip and continue Bicitra   





(3) Cellulitis


Comment: Presumable cause of sepsis. Chronic leg wound. Follows with the wound 

clinic. 


Appreciate ID input, continue Clindamycin now PO. Wash leg daily with soap and 

water. No dressing needed.   





(4) Elevated troponin


Comment: Trop peaked at 0.40. Suspect secondary to demand ischemia. No CP, SOB, 

or EKG changes. Consider outpatient stress test.   





(5) Afib


Comment: Eliquis on hold d/t renal function.  Continue beta blocker.  Patient 

has documented allergy to warfarin.   





(6) CHF (congestive heart failure)


Comment: Significantly increased LE edema without increased SOB. EF 40-45 by 

most recent Echo, Consistent with 11/16 TTE. Continue metoprolol.     





(7) Diabetes mellitus


Comment: Controlled. FSBG and SSI.   





(8) HLD (hyperlipidemia)


Comment: Continue simvastatin    





(9) HTN (hypertension)


Comment: Controlled with metoprolol.   





(10) DVT prophylaxis


Comment: HSQ   





(11) Full code status





Status and Disposition: 


Inpatient. LOS > 2 days. Discharge when medically stable.

## 2017-11-02 LAB
ANION GAP SERPL CALC-SCNC: 13 MMOL/L (ref 2–11)
ANION GAP SERPL CALC-SCNC: 14 MMOL/L (ref 2–11)
BUN SERPL-MCNC: 95 MG/DL (ref 6–24)
BUN SERPL-MCNC: 97 MG/DL (ref 6–24)
BUN/CREAT SERPL: 18.9 (ref 8–20)
BUN/CREAT SERPL: 20.7 (ref 8–20)
CALCIUM SERPL-MCNC: 9.7 MG/DL (ref 8.6–10.3)
CALCIUM SERPL-MCNC: 9.8 MG/DL (ref 8.6–10.3)
CHLORIDE SERPL-SCNC: 97 MMOL/L (ref 101–111)
CHLORIDE SERPL-SCNC: 97 MMOL/L (ref 101–111)
FRACT EXCRET NA UR+SERPL-RTO: 0.28 %
GLUCOSE SERPL-MCNC: 74 MG/DL (ref 70–100)
GLUCOSE SERPL-MCNC: 78 MG/DL (ref 70–100)
HCO3 SERPL-SCNC: 19 MMOL/L (ref 22–32)
HCO3 SERPL-SCNC: 20 MMOL/L (ref 22–32)
HCT VFR BLD AUTO: 37 % (ref 42–52)
HGB BLD-MCNC: 12 G/DL (ref 14–18)
MCH RBC QN AUTO: 29 PG (ref 27–31)
MCHC RBC AUTO-ENTMCNC: 32 G/DL (ref 31–36)
MCV RBC AUTO: 90 FL (ref 80–94)
POTASSIUM SERPL-SCNC: (no result) MMOL/L (ref 3.5–5)
POTASSIUM SERPL-SCNC: 5.8 MMOL/L (ref 3.5–5)
RBC # BLD AUTO: 4.14 10^6/UL (ref 4–5.4)
SODIUM SERPL-SCNC: 130 MMOL/L (ref 133–145)
SODIUM SERPL-SCNC: 130 MMOL/L (ref 133–145)
SODIUM UR-SCNC: < 18 MMOL/L
WBC # BLD AUTO: 10 10^3/UL (ref 3.5–10.8)

## 2017-11-02 RX ADMIN — ACETAMINOPHEN PRN MG: 325 TABLET ORAL at 23:10

## 2017-11-02 RX ADMIN — ALBUMIN (HUMAN) SCH MLS/HR: 0.25 INJECTION, SOLUTION INTRAVENOUS at 05:09

## 2017-11-02 RX ADMIN — ALBUMIN (HUMAN) SCH MLS/HR: 0.25 INJECTION, SOLUTION INTRAVENOUS at 08:38

## 2017-11-02 RX ADMIN — BENZONATATE SCH MG: 100 CAPSULE ORAL at 14:20

## 2017-11-02 RX ADMIN — SODIUM CITRATE AND CITRIC ACID MONOHYDRATE SCH ML: 500; 334 SOLUTION ORAL at 14:20

## 2017-11-02 RX ADMIN — CLINDAMYCIN HYDROCHLORIDE SCH MG: 150 CAPSULE ORAL at 23:11

## 2017-11-02 RX ADMIN — HEPARIN SODIUM SCH UNITS: 5000 INJECTION INTRAVENOUS; SUBCUTANEOUS at 05:47

## 2017-11-02 RX ADMIN — BENZONATATE SCH MG: 100 CAPSULE ORAL at 09:17

## 2017-11-02 RX ADMIN — Medication SCH CAP: at 09:17

## 2017-11-02 RX ADMIN — ATORVASTATIN CALCIUM SCH MG: 10 TABLET, FILM COATED ORAL at 18:23

## 2017-11-02 RX ADMIN — HEPARIN SODIUM SCH UNITS: 5000 INJECTION INTRAVENOUS; SUBCUTANEOUS at 23:13

## 2017-11-02 RX ADMIN — METOPROLOL TARTRATE SCH MG: 25 TABLET, FILM COATED ORAL at 09:16

## 2017-11-02 RX ADMIN — METOLAZONE SCH MG: 5 TABLET ORAL at 09:17

## 2017-11-02 RX ADMIN — ALBUMIN (HUMAN) SCH MLS/HR: 0.25 INJECTION, SOLUTION INTRAVENOUS at 20:41

## 2017-11-02 RX ADMIN — ALBUMIN (HUMAN) SCH MLS/HR: 0.25 INJECTION, SOLUTION INTRAVENOUS at 01:41

## 2017-11-02 RX ADMIN — INSULIN LISPRO SCH: 100 INJECTION, SOLUTION INTRAVENOUS; SUBCUTANEOUS at 16:55

## 2017-11-02 RX ADMIN — HYDROXYZINE HYDROCHLORIDE PRN MG: 25 TABLET, FILM COATED ORAL at 05:48

## 2017-11-02 RX ADMIN — FUROSEMIDE SCH MG: 40 TABLET ORAL at 11:03

## 2017-11-02 RX ADMIN — TRAMADOL HYDROCHLORIDE PRN MG: 50 TABLET, FILM COATED ORAL at 23:12

## 2017-11-02 RX ADMIN — SODIUM CITRATE AND CITRIC ACID MONOHYDRATE SCH ML: 500; 334 SOLUTION ORAL at 23:07

## 2017-11-02 RX ADMIN — BENZONATATE SCH MG: 100 CAPSULE ORAL at 18:23

## 2017-11-02 RX ADMIN — HYDROXYZINE HYDROCHLORIDE PRN MG: 25 TABLET, FILM COATED ORAL at 23:11

## 2017-11-02 RX ADMIN — HEPARIN SODIUM SCH: 5000 INJECTION INTRAVENOUS; SUBCUTANEOUS at 14:21

## 2017-11-02 RX ADMIN — INSULIN LISPRO SCH: 100 INJECTION, SOLUTION INTRAVENOUS; SUBCUTANEOUS at 07:48

## 2017-11-02 RX ADMIN — ALBUMIN (HUMAN) SCH: 0.25 INJECTION, SOLUTION INTRAVENOUS at 15:07

## 2017-11-02 RX ADMIN — ALBUMIN (HUMAN) SCH MLS/HR: 0.25 INJECTION, SOLUTION INTRAVENOUS at 16:58

## 2017-11-02 RX ADMIN — CLINDAMYCIN HYDROCHLORIDE SCH MG: 150 CAPSULE ORAL at 05:47

## 2017-11-02 RX ADMIN — ONDANSETRON PRN MG: 2 INJECTION INTRAMUSCULAR; INTRAVENOUS at 07:58

## 2017-11-02 RX ADMIN — ALBUMIN (HUMAN) SCH MLS/HR: 0.25 INJECTION, SOLUTION INTRAVENOUS at 11:55

## 2017-11-02 RX ADMIN — ALBUMIN (HUMAN) SCH: 0.25 INJECTION, SOLUTION INTRAVENOUS at 05:16

## 2017-11-02 RX ADMIN — CLINDAMYCIN HYDROCHLORIDE SCH MG: 150 CAPSULE ORAL at 14:20

## 2017-11-02 RX ADMIN — INSULIN LISPRO SCH: 100 INJECTION, SOLUTION INTRAVENOUS; SUBCUTANEOUS at 11:41

## 2017-11-02 RX ADMIN — METOPROLOL TARTRATE SCH: 25 TABLET, FILM COATED ORAL at 23:14

## 2017-11-02 RX ADMIN — SODIUM CITRATE AND CITRIC ACID MONOHYDRATE SCH ML: 500; 334 SOLUTION ORAL at 09:17

## 2017-11-02 RX ADMIN — BENZONATATE SCH MG: 100 CAPSULE ORAL at 23:11

## 2017-11-02 NOTE — RAD
HISTORY: Line placement



COMPARISONS: November 01, 2017



VIEWS: 2: frontal portable view of the chest at 4:10 PM. The patient is obliqued to the

left



FINDINGS:

LINES AND TUBES: There is a central venous catheter from a subclavian approach with the

tip overlying the projected location of the superior vena cava..

CARDIOMEDIASTINAL SILHOUETTE: The cardiac silhouette is enlarged. The cardiomediastinal

silhouette is otherwise normal for portable technique.

PLEURA: There is no appreciable pneumothorax.

LUNG PARENCHYMA: There is patchy bibasilar alveolar opacification

ABDOMEN: The upper abdomen is clear. There is no subphrenic gas.

BONES AND SOFT TISSUES: No bone or soft tissue abnormalities are noted.



IMPRESSION: 

1.  LINES AND TUBES AS ABOVE.

2.  NO APPRECIABLE PNEUMOTHORAX.

3.  CARDIOMEGALY.

4.  BIBASILAR ATELECTASIS VERSUS CONSOLIDATION

## 2017-11-02 NOTE — PN
Subjective


Date of Service: 11/02/17


Interval History: 





Mr. Blake reports that his back hurts from laying in the bed.  He denies other 

complaint including chest pain, SOB, nausea, or abdominal pain.








Family History: Unchanged from Admission


Social History: Unchanged from Admission


Past Medical History: Unchanged from Admission





Objective


Active Medications: 





Acetaminophen (Tylenol Tab*)  650 mg PO Q4H PRN


Atorvastatin Calcium (Lipitor*)  10 mg PO 1700 ALEJANDRA


Benzonatate (Tessalon Cap*)  100 mg PO QID ALEJANDRA


Citric Acid/Sodium Citrate (Bicitra*)  15 ml PO TID ALEJANDRA


Clindamycin HCl (Cleocin Cap*)  300 mg PO Q8HR ALEJANDRA


Dextrose (D50w Syringe 50 Ml*)  12.5 gm IV PUSH .FOR FS < 60 - SS PRN


Diphenhydramine HCl (Benadryl Po*)  25 mg PO Q6H PRN


Furosemide (Lasix Tab*)  120 mg PO 1000 ALEJANDRA


Heparin Sodium (Porcine) (Heparin Vial(*))  5,000 units SUBCUT Q8HR ALEJANDRA


Hydroxyzine HCl (Atarax Tab*)  25 mg PO Q4H PRN


Albumin Human 50 ml/ IV (Solution)  50 mls @ 16 mls/hr IV Q3H ALEJANDRA


Insulin Human Lispro (Humalog*)  0 units SUBCUT AC ALEJANDRA


Lactobacillus Rhamnosus (Culturelle*)  1 cap PO DAILY ALEJANDRA


Metolazone (Zaroxolyn Tab*)  5 mg PO DAILY ALEJANDRA


Metoprolol Tartrate (Lopressor Tab*)  25 mg PO BID ALEJANDRA


Ondansetron HCl (Zofran Inj*)  4 mg IV Q6H PRN


Tramadol HCl (Ultram*)  50 mg PO Q6H PRN








Vital Signs:











Temp Pulse Resp BP Pulse Ox


 


 97.5 F   81   18   102/66   92 


 


 11/02/17 09:28  11/02/17 09:28  11/02/17 08:51  11/02/17 09:28  11/02/17 08:51











Oxygen Devices in Use Now: None


Appearance: Male lying in bed in NAD


Eyes: No Scleral Icterus


Ears/Nose/Mouth/Throat: NL Teeth, Lips, Gums


Neck: NL Appearance and Movements; NL JVP


Respiratory: Symmetrical Chest Expansion and Respiratory Effort, Clear to 

Auscultation


Cardiovascular: NL Sounds; No Murmurs; No JVD, - - +2-3 pitting edema


Abdominal: NL Sounds; No Tenderness; No Distention


Lymphatic: No Cervical Adenopathy


Extremities: - - +2-3 pitting edema to LEs, above level of knee


Skin: - - LE dressings CDI


Neurological: Alert and Oriented x 3, NL Muscle Strength and Tone


Nutrition: Taking PO's


Result Diagrams: 


 11/02/17 05:49





 11/02/17 09:00


Microbiology and Other Data: 


.





Assess/Plan/Problems-Billing





Mr. Blake is a 61yo male with a PMH significant for morbid obesity, CHF, 

paroxysmal Afib, DMII, HTN, HLD, Depression, and CKD who presents after a fall 

with prolonged down time with sepsis secondary to a lower leg cellulitis and UTI

, also with mild rhabdomyolysis, Prerenal JOSHUA, and elevated troponins now with 

acute renal failure.





- Patient Problems


(1) Acute renal failure


Comment: 


- Creatinine continues to rise


- Appreciate nephrology input.  


- No response to metolazone and lasix today. Dialysis catheter placed by 

surgery.  Plan for dialysis tomorrow.   





(2) CHF (congestive heart failure)


Comment: 


- Significantly increased LE edema without increased SOB. 


- EF 40-45 by most recent Echo, Consistent with 11/16 TTE. 


- Continue metoprolol.     





(3) Acidosis, metabolic


Comment: 


- Improved.  Continue Bicitra.   





(4) Cellulitis


Comment: 


- Chronic leg wound with cellulitis. Follows with the wound clinic. 


- Appreciate ID input, continue Clindamycin now PO. 


- Wash leg daily with soap and water. No dressing needed.   





(5) UTI (urinary tract infection)


Comment: 


- Asymptomatic.  


- Only 10,000 colonies of proteus.


- Is on clindamycin for LE wound, Repeat UA now.     





(6) Elevated troponin


Comment: 


- Trop peaked at 0.40. Suspect secondary to demand ischemia. 


- No CP, SOB, or EKG changes. 


- Consider outpatient stress test when recovered from acute illness.   





(7) HTN (hypertension)


Comment: 


- Reduce dose of metoprolol to allow for diuresis as needed.   





(8) Diabetes mellitus


Comment: 


- Controlled. 


- Continue lispro SSI coverage with meals.   





(9) Afib


Comment: 


- Eliquis on hold d/t renal function.  Patient has documented allergy to 

warfarin.  No history of CVA therefore does not need bridging therapy.


- Continue beta blocker.     





(10) HLD (hyperlipidemia)


Comment: 


- Continue simvastatin    





(11) Rhabdomyolysis


Comment: 


- Resolved.    





(12) DVT prophylaxis


Comment: 


- HSQ   





(13) Full code status


Current Visit: Yes   Status: Acute   Code(s): Z78.9 - OTHER SPECIFIED HEALTH 

STATUS   SNOMED Code(s): 521718023


   


Status and Disposition: 


Inpatient with LOS > 2 days.  May need sub acute rehab at discharge.

## 2017-11-03 LAB
ANION GAP SERPL CALC-SCNC: 18 MMOL/L (ref 2–11)
BUN SERPL-MCNC: 100 MG/DL (ref 6–24)
BUN/CREAT SERPL: 19.2 (ref 8–20)
CALCIUM SERPL-MCNC: 9.7 MG/DL (ref 8.6–10.3)
CHLORIDE SERPL-SCNC: 98 MMOL/L (ref 101–111)
GLUCOSE SERPL-MCNC: 58 MG/DL (ref 70–100)
HCO3 SERPL-SCNC: 14 MMOL/L (ref 22–32)
POTASSIUM SERPL-SCNC: (no result) MMOL/L (ref 3.5–5)
SODIUM SERPL-SCNC: 130 MMOL/L (ref 133–145)

## 2017-11-03 PROCEDURE — 5A1D70Z PERFORMANCE OF URINARY FILTRATION, INTERMITTENT, LESS THAN 6 HOURS PER DAY: ICD-10-PCS | Performed by: INTERNAL MEDICINE

## 2017-11-03 RX ADMIN — ACETAMINOPHEN PRN MG: 325 TABLET ORAL at 20:47

## 2017-11-03 RX ADMIN — CLINDAMYCIN HYDROCHLORIDE SCH MG: 150 CAPSULE ORAL at 06:13

## 2017-11-03 RX ADMIN — INSULIN LISPRO SCH: 100 INJECTION, SOLUTION INTRAVENOUS; SUBCUTANEOUS at 17:15

## 2017-11-03 RX ADMIN — SODIUM CITRATE AND CITRIC ACID MONOHYDRATE SCH ML: 500; 334 SOLUTION ORAL at 09:05

## 2017-11-03 RX ADMIN — ALBUMIN (HUMAN) SCH MLS/HR: 0.25 INJECTION, SOLUTION INTRAVENOUS at 12:36

## 2017-11-03 RX ADMIN — BENZONATATE SCH MG: 100 CAPSULE ORAL at 14:52

## 2017-11-03 RX ADMIN — MORPHINE SULFATE PRN MG: 2 INJECTION, SOLUTION INTRAMUSCULAR; INTRAVENOUS at 01:07

## 2017-11-03 RX ADMIN — SODIUM CITRATE AND CITRIC ACID MONOHYDRATE SCH ML: 500; 334 SOLUTION ORAL at 20:48

## 2017-11-03 RX ADMIN — METOPROLOL TARTRATE SCH MG: 25 TABLET, FILM COATED ORAL at 20:45

## 2017-11-03 RX ADMIN — INSULIN LISPRO SCH: 100 INJECTION, SOLUTION INTRAVENOUS; SUBCUTANEOUS at 07:56

## 2017-11-03 RX ADMIN — Medication SCH CAP: at 14:52

## 2017-11-03 RX ADMIN — METOLAZONE SCH MG: 5 TABLET ORAL at 14:52

## 2017-11-03 RX ADMIN — CLINDAMYCIN HYDROCHLORIDE SCH MG: 150 CAPSULE ORAL at 14:52

## 2017-11-03 RX ADMIN — FUROSEMIDE SCH MG: 40 TABLET ORAL at 14:52

## 2017-11-03 RX ADMIN — SODIUM CITRATE AND CITRIC ACID MONOHYDRATE SCH ML: 500; 334 SOLUTION ORAL at 14:52

## 2017-11-03 RX ADMIN — ALBUMIN (HUMAN) SCH MLS/HR: 0.25 INJECTION, SOLUTION INTRAVENOUS at 15:50

## 2017-11-03 RX ADMIN — BENZONATATE SCH MG: 100 CAPSULE ORAL at 18:05

## 2017-11-03 RX ADMIN — ALBUMIN (HUMAN) SCH MLS/HR: 0.25 INJECTION, SOLUTION INTRAVENOUS at 09:05

## 2017-11-03 RX ADMIN — TRAMADOL HYDROCHLORIDE PRN MG: 50 TABLET, FILM COATED ORAL at 20:47

## 2017-11-03 RX ADMIN — ALBUMIN (HUMAN) SCH MLS/HR: 0.25 INJECTION, SOLUTION INTRAVENOUS at 04:29

## 2017-11-03 RX ADMIN — METOPROLOL TARTRATE SCH: 25 TABLET, FILM COATED ORAL at 14:28

## 2017-11-03 RX ADMIN — HEPARIN SODIUM SCH UNITS: 5000 INJECTION INTRAVENOUS; SUBCUTANEOUS at 14:52

## 2017-11-03 RX ADMIN — ATORVASTATIN CALCIUM SCH MG: 10 TABLET, FILM COATED ORAL at 18:05

## 2017-11-03 RX ADMIN — ALBUMIN (HUMAN) SCH MLS/HR: 0.25 INJECTION, SOLUTION INTRAVENOUS at 01:12

## 2017-11-03 RX ADMIN — BENZONATATE SCH MG: 100 CAPSULE ORAL at 09:05

## 2017-11-03 RX ADMIN — BENZONATATE SCH MG: 100 CAPSULE ORAL at 20:47

## 2017-11-03 RX ADMIN — HEPARIN SODIUM SCH UNITS: 5000 INJECTION INTRAVENOUS; SUBCUTANEOUS at 06:13

## 2017-11-03 RX ADMIN — INSULIN LISPRO SCH: 100 INJECTION, SOLUTION INTRAVENOUS; SUBCUTANEOUS at 14:16

## 2017-11-03 RX ADMIN — ALBUMIN (HUMAN) SCH MLS/HR: 0.25 INJECTION, SOLUTION INTRAVENOUS at 21:14

## 2017-11-03 NOTE — PN
Subjective


Date of Service: 11/03/17


Interval History: 





Mr. Blake states that he feels quite well today.  He denies chest pain, SOB, 

nausea, or abdominal pain.  He also denies pain to his back or scrotum though 

this had bothered him yesterday.  He is tolerating oral intake well.





Patient underwent dialysis today with no report of complication.








Family History: Unchanged from Admission


Social History: Unchanged from Admission


Past Medical History: Unchanged from Admission





Objective


Active Medications: 





Acetaminophen (Tylenol Tab*)  650 mg PO Q4H PRN


Atorvastatin Calcium (Lipitor*)  10 mg PO 1700 ALEJANDRA


Benzonatate (Tessalon Cap*)  100 mg PO QID ALEJANDRA


Citric Acid/Sodium Citrate (Bicitra*)  15 ml PO TID ALEJANDRA


Clindamycin HCl (Cleocin Cap*)  300 mg PO Q8HR ALEJANDRA


Dextrose (D50w Syringe 50 Ml*)  12.5 gm IV PUSH .FOR FS < 60 - SS PRN


Dextrose (D50w Syringe 50 Ml*)  25 gm IV PUSH ONCE PRN


Diphenhydramine HCl (Benadryl Po*)  25 mg PO Q6H PRN


Furosemide (Lasix Tab*)  120 mg PO 1000 ALEJANDRA


Heparin Sodium (Porcine) (Heparin Vial(*))  5,000 units SUBCUT Q8HR ALEJANDRA


Hydroxyzine HCl (Atarax Tab*)  25 mg PO Q4H PRN


Albumin Human 50 ml/ IV (Solution)  50 mls @ 16 mls/hr IV Q3H ALEJANDRA


Insulin Human Lispro (Humalog*)  0 units SUBCUT AC ALEJANDRA


Lactobacillus Rhamnosus (Culturelle*)  1 cap PO DAILY ALEJANDRA


Metolazone (Zaroxolyn Tab*)  5 mg PO DAILY ALEJANDRA


Metoprolol Tartrate (Lopressor Tab*)  12.5 mg PO BID ALEJANDRA


Morphine Sulfate (Morphine Inj (Syringe)*)  2 mg IV Q4H PRN


Ondansetron HCl (Zofran Inj*)  4 mg IV Q6H PRN


Tramadol HCl (Ultram*)  50 mg PO Q6H PRN





 Vital Signs











  11/02/17 11/02/17 11/02/17





  16:00 16:35 17:15


 


Temperature  97.1 F 97.2 F


 


Pulse Rate  76 75


 


Respiratory  16 16





Rate   


 


Blood Pressure  104/72 100/68





(mmHg)   


 


O2 Sat by Pulse 97 96 97





Oximetry   














  11/02/17 11/02/17 11/02/17





  20:00 20:32 20:59


 


Temperature  97.5 F 94.4 F


 


Pulse Rate  77 80


 


Respiratory 18 16 18





Rate   


 


Blood Pressure  102/58 95/60





(mmHg)   


 


O2 Sat by Pulse  88 89





Oximetry   














  11/02/17 11/02/17 11/02/17





  21:30 22:00 23:12


 


Temperature 97.3 F 97.5 F 


 


Pulse Rate  78 


 


Respiratory  20 20





Rate   


 


Blood Pressure  88/58 





(mmHg)   


 


O2 Sat by Pulse  95 





Oximetry   














  11/03/17 11/03/17 11/03/17





  00:00 00:24 00:31


 


Temperature  97.5 F 


 


Pulse Rate  79 79


 


Respiratory  20 





Rate   


 


Blood Pressure  88/61 109/61





(mmHg)   


 


O2 Sat by Pulse 93 91 





Oximetry   














  11/03/17 11/03/17 11/03/17





  01:07 01:12 01:20


 


Temperature   97.3 F


 


Pulse Rate   


 


Respiratory 20 20 20





Rate   


 


Blood Pressure   102/61





(mmHg)   


 


O2 Sat by Pulse   91





Oximetry   














  11/03/17 11/03/17 11/03/17





  02:07 03:41 04:35


 


Temperature  97.4 F 97.4 F


 


Pulse Rate  74 78


 


Respiratory 18 20 20





Rate   


 


Blood Pressure  101/68 102/64





(mmHg)   


 


O2 Sat by Pulse  95 93





Oximetry   














  11/03/17 11/03/17 11/03/17





  07:52 09:07 12:34


 


Temperature 96.8 F  98.2 F


 


Pulse Rate 76 74 80


 


Respiratory 16 12 16





Rate   


 


Blood Pressure 97/70 98/60 101/51





(mmHg)   


 


O2 Sat by Pulse 92 92 92





Oximetry   














  11/03/17





  13:59


 


Temperature 96.5 F


 


Pulse Rate 79


 


Respiratory 10





Rate 


 


Blood Pressure 93/57





(mmHg) 


 


O2 Sat by Pulse 96





Oximetry 











Oxygen Devices in Use Now: Nasal Cannula


Appearance: Male sitting up in bed in NAD


Eyes: No Scleral Icterus


Ears/Nose/Mouth/Throat: Mucous Membranes Moist


Neck: Trachea Midline


Respiratory: Symmetrical Chest Expansion and Respiratory Effort, Clear to 

Auscultation


Cardiovascular: NL Sounds; No Murmurs; No JVD, - - +2 pitting edema


Abdominal: NL Sounds; No Tenderness; No Distention


Lymphatic: No Cervical Adenopathy


Skin: No Rash or Ulcers


Neurological: Alert and Oriented x 3, NL Muscle Strength and Tone


Nutrition: Taking PO's


Result Diagrams: 


 11/02/17 05:49





 11/03/17 11:00


Microbiology and Other Data: 


.





Assess/Plan/Problems-Billing





Mr. Blake is a 63yo male with a PMH significant for morbid obesity, CHF, 

paroxysmal Afib, DMII, HTN, HLD, Depression, and CKD who presents after a fall 

with prolonged down time with sepsis secondary to a lower leg cellulitis and UTI

, also with mild rhabdomyolysis, Prerenal JOSHUA, and elevated troponins now with 

acute renal failure.





- Patient Problems


(1) Acute renal failure


Comment: 


- Appreciate nephrology input.  


- Dialysis completed today, further management per Dr. Hernandez and his team.   





(2) CHF (congestive heart failure)


Comment: 


- EF 40-45 by most recent Echo, Consistent with 11/16 TTE. 


- Continue metoprolol as tolerated.     





(3) Acidosis, metabolic


Comment: 


- Worsened today.  Continue Bicitra and dialysis.   





(4) Cellulitis


Comment: 


- Chronic leg wound with cellulitis. Follows with the wound clinic. 


- Appreciate ID input, continue Clindamycin now PO. 


- Wash leg daily with soap and water. No dressing needed.   





(5) UTI (urinary tract infection)


Comment: 


- Asymptomatic.  


- Only 10,000 colonies of proteus.


- On clindamycin for LE wound.   





(6) Elevated troponin


Comment: 


- Trop peaked at 0.40. Suspect secondary to demand ischemia. 


- No CP, SOB, or EKG changes. 


- Consider outpatient stress test when recovered from acute illness.   





(7) HTN (hypertension)


Comment: 


- Reduce dose of metoprolol to allow for diuresis as needed.   





(8) Diabetes mellitus


Comment: 


- Controlled. 


- Continue lispro SSI coverage with meals.   





(9) Afib


Comment: 


- Eliquis on hold d/t renal function.  Patient has documented allergy to 

warfarin.  No history of CVA therefore does not need bridging therapy.


- Continue beta blocker.     





(10) HLD (hyperlipidemia)


Comment: 


- Continue simvastatin    





(11) Rhabdomyolysis


Comment: 


- Resolved.    





(12) DVT prophylaxis


Comment: 


- HSQ   





(13) Full code status


Current Visit: Yes   Status: Acute   Code(s): Z78.9 - OTHER SPECIFIED HEALTH 

STATUS   SNOMED Code(s): 959446375


   


Status and Disposition: 


Inpatient with LOS > 2 days.  May need sub acute rehab at discharge.

## 2017-11-03 NOTE — OP
CC:  Dr. Otis Hernandez *

 

DATE OF OPERATION:  11/02/17 - ROOM #453

 

DATE OF BIRTH:  [birth year]

 

SURGEON:  Luis Treadwell MD

 

ASSISTANT:  None.

 

ANESTHESIOLOGIST:  None.

 

PRE-OP DIAGNOSIS:  Renal failure.

 

POST-OP DIAGNOSIS:  Renal failure.

 

OPERATIVE PROCEDURE:  Placement of right subclavian temporary hemodialysis 
catheter.

 

DESCRIPTION OF PROCEDURE:  The patient is supine on the stretcher in the 
procedure room.  The time-out was performed to confirm patient identity and 
procedure.  The right neck and subclavian region were prepped with Chlora-Prep 
and draped in a sterile fashion.  A sterile gown, mask, gloves, and so forth 
are utilized, and local anesthetic was administered, a total of 12 mL of 1% 
lidocaine.  Subclavian venipuncture was carried out without difficulty.  
Guidewire passed without difficulty.  Dilator was utilized, and then the 
catheter passed without difficulty.  There was excellent blood return.  It was 
flushed with saline solution followed by the final heparin flush solution.  The 
catheter was sutured with 3-0 nylon followed by a sterile dressing.  He 
tolerated the procedure well and was brought back to his room following the 
procedure.  There were no complications.  No drains.  No specimens and about 20 
mL blood loss.

 

 [id number]/[facility] #: [id number]

MTDD

## 2017-11-04 LAB
ANION GAP SERPL CALC-SCNC: 14 MMOL/L (ref 2–11)
BUN SERPL-MCNC: 95 MG/DL (ref 6–24)
BUN/CREAT SERPL: 17.9 (ref 8–20)
CALCIUM SERPL-MCNC: 9.9 MG/DL (ref 8.6–10.3)
CHLORIDE SERPL-SCNC: 96 MMOL/L (ref 101–111)
GLUCOSE SERPL-MCNC: 77 MG/DL (ref 70–100)
HCO3 SERPL-SCNC: 21 MMOL/L (ref 22–32)
POTASSIUM SERPL-SCNC: 5.6 MMOL/L (ref 3.5–5)
SODIUM SERPL-SCNC: 131 MMOL/L (ref 133–145)

## 2017-11-04 PROCEDURE — 05H533Z INSERTION OF INFUSION DEVICE INTO RIGHT SUBCLAVIAN VEIN, PERCUTANEOUS APPROACH: ICD-10-PCS | Performed by: SURGERY

## 2017-11-04 RX ADMIN — METOLAZONE SCH MG: 5 TABLET ORAL at 08:29

## 2017-11-04 RX ADMIN — INSULIN LISPRO SCH: 100 INJECTION, SOLUTION INTRAVENOUS; SUBCUTANEOUS at 07:51

## 2017-11-04 RX ADMIN — SODIUM CITRATE AND CITRIC ACID MONOHYDRATE SCH ML: 500; 334 SOLUTION ORAL at 21:50

## 2017-11-04 RX ADMIN — ALBUMIN (HUMAN) SCH MLS/HR: 0.25 INJECTION, SOLUTION INTRAVENOUS at 17:24

## 2017-11-04 RX ADMIN — HEPARIN SODIUM SCH UNITS: 5000 INJECTION INTRAVENOUS; SUBCUTANEOUS at 21:52

## 2017-11-04 RX ADMIN — HEPARIN SODIUM SCH UNITS: 5000 INJECTION INTRAVENOUS; SUBCUTANEOUS at 13:34

## 2017-11-04 RX ADMIN — CLINDAMYCIN HYDROCHLORIDE SCH MG: 150 CAPSULE ORAL at 01:22

## 2017-11-04 RX ADMIN — ALBUMIN (HUMAN) SCH MLS/HR: 0.25 INJECTION, SOLUTION INTRAVENOUS at 04:08

## 2017-11-04 RX ADMIN — ALBUMIN (HUMAN) SCH MLS/HR: 0.25 INJECTION, SOLUTION INTRAVENOUS at 07:23

## 2017-11-04 RX ADMIN — METOPROLOL TARTRATE SCH MG: 25 TABLET, FILM COATED ORAL at 08:30

## 2017-11-04 RX ADMIN — BENZONATATE SCH MG: 100 CAPSULE ORAL at 17:24

## 2017-11-04 RX ADMIN — CLINDAMYCIN HYDROCHLORIDE SCH MG: 150 CAPSULE ORAL at 21:52

## 2017-11-04 RX ADMIN — SODIUM CITRATE AND CITRIC ACID MONOHYDRATE SCH ML: 500; 334 SOLUTION ORAL at 13:34

## 2017-11-04 RX ADMIN — INSULIN LISPRO SCH: 100 INJECTION, SOLUTION INTRAVENOUS; SUBCUTANEOUS at 16:35

## 2017-11-04 RX ADMIN — SODIUM CITRATE AND CITRIC ACID MONOHYDRATE SCH ML: 500; 334 SOLUTION ORAL at 08:29

## 2017-11-04 RX ADMIN — INSULIN LISPRO SCH: 100 INJECTION, SOLUTION INTRAVENOUS; SUBCUTANEOUS at 11:57

## 2017-11-04 RX ADMIN — ALBUMIN (HUMAN) SCH MLS/HR: 0.25 INJECTION, SOLUTION INTRAVENOUS at 20:23

## 2017-11-04 RX ADMIN — BENZONATATE SCH MG: 100 CAPSULE ORAL at 08:29

## 2017-11-04 RX ADMIN — ATORVASTATIN CALCIUM SCH MG: 10 TABLET, FILM COATED ORAL at 17:24

## 2017-11-04 RX ADMIN — ALBUMIN (HUMAN) SCH MLS/HR: 0.25 INJECTION, SOLUTION INTRAVENOUS at 11:11

## 2017-11-04 RX ADMIN — HEPARIN SODIUM SCH UNITS: 5000 INJECTION INTRAVENOUS; SUBCUTANEOUS at 01:19

## 2017-11-04 RX ADMIN — HEPARIN SODIUM SCH UNITS: 5000 INJECTION INTRAVENOUS; SUBCUTANEOUS at 05:02

## 2017-11-04 RX ADMIN — ACETAMINOPHEN PRN MG: 325 TABLET ORAL at 01:22

## 2017-11-04 RX ADMIN — Medication SCH CAP: at 08:29

## 2017-11-04 RX ADMIN — FUROSEMIDE SCH MG: 40 TABLET ORAL at 08:29

## 2017-11-04 RX ADMIN — BENZONATATE SCH MG: 100 CAPSULE ORAL at 13:34

## 2017-11-04 RX ADMIN — BENZONATATE SCH MG: 100 CAPSULE ORAL at 21:52

## 2017-11-04 RX ADMIN — ALBUMIN (HUMAN) SCH: 0.25 INJECTION, SOLUTION INTRAVENOUS at 01:49

## 2017-11-04 RX ADMIN — ALBUMIN (HUMAN) SCH MLS/HR: 0.25 INJECTION, SOLUTION INTRAVENOUS at 13:34

## 2017-11-04 RX ADMIN — CLINDAMYCIN HYDROCHLORIDE SCH MG: 150 CAPSULE ORAL at 13:34

## 2017-11-04 RX ADMIN — ALBUMIN (HUMAN) SCH MLS/HR: 0.25 INJECTION, SOLUTION INTRAVENOUS at 23:49

## 2017-11-04 RX ADMIN — CLINDAMYCIN HYDROCHLORIDE SCH MG: 150 CAPSULE ORAL at 05:01

## 2017-11-04 RX ADMIN — ALBUMIN (HUMAN) SCH MLS/HR: 0.25 INJECTION, SOLUTION INTRAVENOUS at 01:16

## 2017-11-04 RX ADMIN — ALBUMIN (HUMAN) SCH: 0.25 INJECTION, SOLUTION INTRAVENOUS at 01:48

## 2017-11-04 NOTE — PN
Subjective


Date of Service: 11/04/17


Interval History: 





Mr. Blake is not very communicative though he states that he is feeling fine today

, even better than yesterday.  He denies any complaint.  He continues to refuse 

to participate with his care and refuses to get out of bed to chair.





Family History: Unchanged from Admission


Social History: Unchanged from Admission


Past Medical History: Unchanged from Admission





Objective


Active Medications: 





Acetaminophen (Tylenol Tab*)  650 mg PO Q4H PRN


Atorvastatin Calcium (Lipitor*)  10 mg PO 1700 ALEJANDRA


Benzonatate (Tessalon Cap*)  100 mg PO QID ALEJANDRA


Citric Acid/Sodium Citrate (Bicitra*)  15 ml PO TID ALEJANDRA


Clindamycin HCl (Cleocin Cap*)  300 mg PO Q8HR ALEJANDRA


Dextrose (D50w Syringe 50 Ml*)  12.5 gm IV PUSH .FOR FS < 60 - SS PRN


Dextrose (D50w Syringe 50 Ml*)  25 gm IV PUSH ONCE PRN


Diphenhydramine HCl (Benadryl Po*)  25 mg PO Q6H PRN


Furosemide (Lasix Tab*)  120 mg PO 1000 ALEJANDRA


Heparin Sodium (Porcine) (Heparin Vial(*))  5,000 units SUBCUT Q8HR ALEJANDRA


Hydroxyzine HCl (Atarax Tab*)  25 mg PO Q4H PRN


Albumin Human 50 ml/ IV (Solution)  50 mls @ 16 mls/hr IV Q3HR ALEJANDRA


Insulin Human Lispro (Humalog*)  0 units SUBCUT AC ALEJANDRA


Lactobacillus Rhamnosus (Culturelle*)  1 cap PO DAILY ALEJANDRA


Metolazone (Zaroxolyn Tab*)  5 mg PO DAILY ALEJANDRA


Morphine Sulfate (Morphine Inj (Syringe)*)  2 mg IV Q4H PRN


Ondansetron HCl (Zofran Inj*)  4 mg IV Q6H PRN


Tramadol HCl (Ultram*)  50 mg PO Q6H PRN








Vital Signs:











Temp Pulse Resp BP Pulse Ox


 


 97.1 F   73   12   101/57   98 


 


 11/04/17 13:45  11/04/17 13:45  11/04/17 13:45  11/04/17 13:45  11/04/17 16:00











Oxygen Devices in Use Now: Nasal Cannula


Appearance: Morbidly obese male lying in bed in NAD


Eyes: No Scleral Icterus


Ears/Nose/Mouth/Throat: Mucous Membranes Moist


Neck: Trachea Midline


Respiratory: Symmetrical Chest Expansion and Respiratory Effort, Clear to 

Auscultation, - - Diminished


Cardiovascular: NL Sounds; No Murmurs; No JVD, - - Edema to bilateral LEs


Skin: No Rash or Ulcers


Neurological: NL Muscle Strength and Tone, - - Awakens to voice and is oriented 

x 3, otherwise sleeping and minimally interactive


Nutrition: Taking PO's


Result Diagrams: 


 11/02/17 05:49





 11/04/17 04:50


Microbiology and Other Data: 


.





Assess/Plan/Problems-Billing





Mr. Blake is a 63yo male with a PMH significant for morbid obesity, CHF, 

paroxysmal Afib, DMII, HTN, HLD, Depression, and CKD who presents after a fall 

with prolonged down time with sepsis secondary to a lower leg cellulitis and UTI

, also with mild rhabdomyolysis, Prerenal JOSHUA, and elevated troponins now with 

acute renal failure.





- Patient Problems


(1) Acute renal failure


Comment: 


- Appreciate nephrology input.  


- Dialysis completed Friday, plan for M/W/F dialysis.   





(2) CHF (congestive heart failure)


Comment: 


- EF 40-45 by most recent Echo, Consistent with 11/16 TTE. 


- Continue metoprolol as tolerated.     





(3) Acidosis, metabolic


Comment: 


- Improved with dialysis.   





(4) Cellulitis


Comment: 


- Chronic leg wound with cellulitis. Follows with the wound clinic. 


- Appreciate ID input, continue Clindamycin now PO. 


- Wash leg daily with soap and water. No dressing needed.   





(5) UTI (urinary tract infection)


Comment: 


- Asymptomatic.  


- Only 10,000 colonies of proteus.


- On clindamycin for LE wound.   





(6) Elevated troponin


Comment: 


- Trop peaked at 0.40. Suspect secondary to demand ischemia. 


- No CP, SOB, or EKG changes. 


- Consider outpatient stress test when recovered from acute illness.   





(7) HTN (hypertension)


Comment: 


- SBP .


- Hold metoprolol.   





(8) Diabetes mellitus


Comment: 


- Controlled. 


- Continue lispro SSI coverage with meals.   





(9) Afib


Comment: 


- Eliquis on hold d/t renal function.  Patient has documented allergy to 

warfarin.  No history of CVA therefore does not need bridging therapy.


- Continue beta blocker.     





(10) HLD (hyperlipidemia)


Comment: 


- Continue simvastatin    





(11) Rhabdomyolysis


Comment: 


- Resolved.    





(12) DVT prophylaxis


Comment: 


- HSQ   





(13) Full code status


Current Visit: Yes   Status: Acute   Code(s): Z78.9 - OTHER SPECIFIED HEALTH 

STATUS   SNOMED Code(s): 137019027


   


Status and Disposition: 


Inpatient with LOS > 2 days.  May need sub acute rehab at discharge.

## 2017-11-05 LAB
ADD DIFF/SLIDE REVIEW?: (no result)
ALBUMIN SERPL BCG-MCNC: 3.9 G/DL (ref 3.2–5.2)
ALP SERPL-CCNC: 106 U/L (ref 34–104)
ALT SERPL W P-5'-P-CCNC: 11 U/L (ref 7–52)
ANION GAP SERPL CALC-SCNC: 14 MMOL/L (ref 2–11)
ANION GAP SERPL CALC-SCNC: 15 MMOL/L (ref 2–11)
AST SERPL-CCNC: (no result) U/L (ref 13–39)
BILIRUB DIRECT SERPL-MCNC: (no result) MG/DL (ref 0.03–0.18)
BILIRUB DIRECT SERPL-MCNC: 1.7 MG/DL (ref 0.03–0.18)
BUN SERPL-MCNC: 106 MG/DL (ref 6–24)
BUN SERPL-MCNC: 97 MG/DL (ref 6–24)
BUN/CREAT SERPL: 16.7 (ref 8–20)
BUN/CREAT SERPL: 17.3 (ref 8–20)
CALCIUM SERPL-MCNC: 10 MG/DL (ref 8.6–10.3)
CALCIUM SERPL-MCNC: 9.9 MG/DL (ref 8.6–10.3)
CHLORIDE SERPL-SCNC: 96 MMOL/L (ref 101–111)
CHLORIDE SERPL-SCNC: 97 MMOL/L (ref 101–111)
EPAP: 10
GLOBULIN SER CALC-MCNC: 3.5 G/DL (ref 2–4)
GLUCOSE SERPL-MCNC: 76 MG/DL (ref 70–100)
GLUCOSE SERPL-MCNC: 76 MG/DL (ref 70–100)
HCO3 SERPL-SCNC: 19 MMOL/L (ref 22–32)
HCO3 SERPL-SCNC: 20 MMOL/L (ref 22–32)
HCT VFR BLD AUTO: 38 % (ref 42–52)
HGB BLD-MCNC: 12 G/DL (ref 14–18)
IPAP: 20
MCH RBC QN AUTO: 29 PG (ref 27–31)
MCHC RBC AUTO-ENTMCNC: 32 G/DL (ref 31–36)
MCV RBC AUTO: 92 FL (ref 80–94)
O2/TOTAL GAS SETTING VFR VENT: 100 %
POTASSIUM SERPL-SCNC: (no result) MMOL/L (ref 3.5–5)
POTASSIUM SERPL-SCNC: 5.9 MMOL/L (ref 3.5–5)
PROT SERPL-MCNC: 7.4 G/DL (ref 6.4–8.9)
RBC # BLD AUTO: 4.14 10^6/UL (ref 4–5.4)
SODIUM SERPL-SCNC: 130 MMOL/L (ref 133–145)
SODIUM SERPL-SCNC: 131 MMOL/L (ref 133–145)
TROPONIN I SERPL-MCNC: 0.05 NG/ML (ref ?–0.04)
TROPONIN I SERPL-MCNC: 0.05 NG/ML (ref ?–0.04)
WBC # BLD AUTO: 14.2 10^3/UL (ref 3.5–10.8)

## 2017-11-05 PROCEDURE — 5A09357 ASSISTANCE WITH RESPIRATORY VENTILATION, LESS THAN 24 CONSECUTIVE HOURS, CONTINUOUS POSITIVE AIRWAY PRESSURE: ICD-10-PCS | Performed by: HOSPITALIST

## 2017-11-05 RX ADMIN — BENZONATATE SCH MG: 100 CAPSULE ORAL at 08:11

## 2017-11-05 RX ADMIN — INSULIN LISPRO SCH: 100 INJECTION, SOLUTION INTRAVENOUS; SUBCUTANEOUS at 11:31

## 2017-11-05 RX ADMIN — CLINDAMYCIN HYDROCHLORIDE SCH MG: 150 CAPSULE ORAL at 06:22

## 2017-11-05 RX ADMIN — SODIUM CITRATE AND CITRIC ACID MONOHYDRATE SCH: 500; 334 SOLUTION ORAL at 21:05

## 2017-11-05 RX ADMIN — FUROSEMIDE SCH MG: 40 TABLET ORAL at 11:08

## 2017-11-05 RX ADMIN — ATORVASTATIN CALCIUM SCH: 10 TABLET, FILM COATED ORAL at 21:04

## 2017-11-05 RX ADMIN — CLINDAMYCIN HYDROCHLORIDE SCH MG: 150 CAPSULE ORAL at 14:38

## 2017-11-05 RX ADMIN — BENZONATATE SCH MG: 100 CAPSULE ORAL at 14:38

## 2017-11-05 RX ADMIN — INSULIN LISPRO SCH: 100 INJECTION, SOLUTION INTRAVENOUS; SUBCUTANEOUS at 21:03

## 2017-11-05 RX ADMIN — HEPARIN SODIUM SCH UNITS: 5000 INJECTION INTRAVENOUS; SUBCUTANEOUS at 22:41

## 2017-11-05 RX ADMIN — ALBUMIN (HUMAN) SCH MLS/HR: 0.25 INJECTION, SOLUTION INTRAVENOUS at 04:04

## 2017-11-05 RX ADMIN — DEXTROSE MONOHYDRATE PRN GM: 25 INJECTION, SOLUTION INTRAVENOUS at 11:45

## 2017-11-05 RX ADMIN — HEPARIN SODIUM SCH UNITS: 5000 INJECTION INTRAVENOUS; SUBCUTANEOUS at 14:37

## 2017-11-05 RX ADMIN — SODIUM CITRATE AND CITRIC ACID MONOHYDRATE SCH ML: 500; 334 SOLUTION ORAL at 08:11

## 2017-11-05 RX ADMIN — METOLAZONE SCH MG: 5 TABLET ORAL at 08:11

## 2017-11-05 RX ADMIN — LACTULOSE SCH GM: 10 SOLUTION ORAL at 22:13

## 2017-11-05 RX ADMIN — ALBUMIN (HUMAN) SCH MLS/HR: 0.25 INJECTION, SOLUTION INTRAVENOUS at 07:10

## 2017-11-05 RX ADMIN — ALBUMIN (HUMAN) SCH MLS/HR: 0.25 INJECTION, SOLUTION INTRAVENOUS at 01:22

## 2017-11-05 RX ADMIN — BENZONATATE SCH: 100 CAPSULE ORAL at 21:04

## 2017-11-05 RX ADMIN — INSULIN LISPRO SCH: 100 INJECTION, SOLUTION INTRAVENOUS; SUBCUTANEOUS at 07:45

## 2017-11-05 RX ADMIN — DEXTROSE MONOHYDRATE PRN GM: 25 INJECTION, SOLUTION INTRAVENOUS at 11:37

## 2017-11-05 RX ADMIN — HEPARIN SODIUM SCH UNITS: 5000 INJECTION INTRAVENOUS; SUBCUTANEOUS at 06:23

## 2017-11-05 RX ADMIN — Medication SCH CAP: at 08:11

## 2017-11-05 RX ADMIN — BENZONATATE SCH: 100 CAPSULE ORAL at 21:05

## 2017-11-05 RX ADMIN — SODIUM CITRATE AND CITRIC ACID MONOHYDRATE SCH: 500; 334 SOLUTION ORAL at 15:07

## 2017-11-05 NOTE — PN
Progress Note





- Progress Note


Date of Service: 11/05/17


Note: 





Patient assessed earlier in shift to be belligerent but oriented and answering 

questions appropriately.  Stopped by to try to speak with patient about 

possible family or friends that could be contacted regarding his illness.  

Patient now minimally responsive, suspect due to acidosis from renal failure 

and possibly CO2 retention given BMI of 58 with likely component of obesity 

hypoventiliation syndrome.  Stat ABG and BMP ordered.  Reviewed case with Dr. Hernandez on the phone, plan for 2 amps bicarb and kayexalate while awaiting 

results.  





Dr. Johnson (covering Hospitalist updated) and will call Dr. Trujillo once results 

of ABG and BMP are available to review.

## 2017-11-05 NOTE — RAD
Indication: Hypoxia.



Single frontal view of the chest performed at 1935 hours was reviewed.



Comparison is made with previous exam dated November 02, 2017.



Cardiomegaly is noted. No alveolar consolidation is noted. Lungs appear clear.



IMPRESSION: NO ACTIVE CARDIOPULMONARY DISEASE IS NOTED.

## 2017-11-05 NOTE — RAD
Indication: Confusion.



CT of the brain was performed without contrast.



Ventricular structures are midline. No midline shift is noted. The extra-axial spaces.

There is no evidence of intracranial mass or hemorrhage. No other high or low density

lesions are identified. Motion artifact degrades the images.



IMPRESSION: No intracranial mass or hemorrhage is noted.

## 2017-11-05 NOTE — PN
Subjective


Date of Service: 11/05/17


Interval History: 





Mr. Blake remains very belligerent and does not want to participate in his care.  

He complains of back pain and does not want to move in bed or be repositioned.  

He denies any other specific complaint including chest pain, SOB, nausea, or 

abdominal pain.








Family History: Unchanged from Admission


Social History: Unchanged from Admission


Past Medical History: Unchanged from Admission





Objective


Active Medications: 





Acetaminophen (Tylenol Tab*)  650 mg PO Q4H PRN


Atorvastatin Calcium (Lipitor*)  10 mg PO 1700 ALEJANDRA


Benzonatate (Tessalon Cap*)  100 mg PO QID ALEJANDRA


Citric Acid/Sodium Citrate (Bicitra*)  15 ml PO TID ALEJANDRA


Clindamycin HCl (Cleocin Cap*)  300 mg PO Q8HR ALEJANDRA


Dextrose (D50w Syringe 50 Ml*)  12.5 gm IV PUSH .FOR FS < 60 - SS PRN


Dextrose (D50w Syringe 50 Ml*)  25 gm IV PUSH ONCE PRN


Diphenhydramine HCl (Benadryl Po*)  25 mg PO Q6H PRN


Furosemide (Lasix Tab*)  120 mg PO 1000 ALEJANDRA


Heparin Sodium (Porcine) (Heparin Vial(*))  5,000 units SUBCUT Q8HR ALEJANDRA


Hydroxyzine HCl (Atarax Tab*)  25 mg PO Q4H PRN


Insulin Human Lispro (Humalog*)  0 units SUBCUT AC ALEJANDRA


Lactobacillus Rhamnosus (Culturelle*)  1 cap PO DAILY ALEJANDRA


Metolazone (Zaroxolyn Tab*)  5 mg PO DAILY ALEJANDRA


Morphine Sulfate (Morphine Inj (Syringe)*)  2 mg IV Q4H PRN


Ondansetron HCl (Zofran Inj*)  4 mg IV Q6H PRN


Tramadol HCl (Ultram*)  50 mg PO Q6H PRN








Vital Signs:











Temp Pulse Resp BP Pulse Ox


 


 96.5 F   73   18   97/54   95 


 


 11/05/17 07:45  11/05/17 11:30  11/05/17 11:30  11/05/17 11:30  11/05/17 11:30











Oxygen Devices in Use Now: Nasal Cannula


Appearance: Morbidly obese male lying in bed in NAD


Eyes: No Scleral Icterus


Ears/Nose/Mouth/Throat: Mucous Membranes Moist


Neck: Trachea Midline


Respiratory: Symmetrical Chest Expansion and Respiratory Effort, Clear to 

Auscultation


Cardiovascular: NL Sounds; No Murmurs; No JVD, - - +2-3 pitting edema


Abdominal: NL Sounds; No Tenderness; No Distention


Lymphatic: No Cervical Adenopathy


Skin: No Rash or Ulcers


Neurological: - - Alert and oriented, refuses to participate in exam


Result Diagrams: 


 11/02/17 05:49





 11/05/17 06:06


Microbiology and Other Data: 


.





Assess/Plan/Problems-Billing





Mr. Blake is a 63yo male with a PMH significant for morbid obesity, CHF, 

paroxysmal Afib, DMII, HTN, HLD, Depression, and CKD who presents after a fall 

with prolonged down time with sepsis secondary to a lower leg cellulitis and UTI

, also with mild rhabdomyolysis, Prerenal JOSHUA, and elevated troponins now with 

acute renal failure.





- Patient Problems


(1) Acute renal failure


Comment: 


- Appreciate nephrology input.  


- Dialysis completed Friday, plan for M/W/F dialysis.


- Patient remains significantly edematous, will give lactulose for 

hyperkalemia.   





(2) CHF (congestive heart failure)


Comment: 


- EF 40-45 by most recent Echo, Consistent with 11/16 TTE. 


- Continue metoprolol as tolerated.     





(3) Acidosis, metabolic


Comment: 


- Improved with dialysis.   





(4) Cellulitis


Comment: 


- Chronic leg wound with cellulitis. Follows with the wound clinic. 


- Appreciate ID input, continue Clindamycin now PO. 


- Wash leg daily with soap and water. No dressing needed.   





(5) Depression


Comment: 


- Patient agitated and refuses to participate in care.  States, "I just wanna 

be left alone."


- Psych consult ordered to assess for appropriateness of starting anti-

depressant.


- Will attempt to determine if patient has any family/friends, it seems that no 

one has visited and there are no next of kin listed in EMR.   





(6) UTI (urinary tract infection)


Comment: 


- Asymptomatic.  


- Only 10,000 colonies of proteus.


- On clindamycin for LE wound.   





(7) Elevated troponin


Comment: 


- Trop peaked at 0.40. Suspect secondary to demand ischemia. 


- No CP, SOB, or EKG changes. 


- Consider outpatient stress test when recovered from acute illness.   





(8) HTN (hypertension)


Comment: 


- SBP .


- Hold metoprolol.   





(9) Diabetes mellitus


Comment: 


- Controlled. 


- Continue lispro SSI coverage with meals.   





(10) Afib


Comment: 


- Eliquis on hold d/t renal function.  Patient has documented allergy to 

warfarin.  No history of CVA therefore does not need bridging therapy.


- Continue beta blocker.     





(11) HLD (hyperlipidemia)


Comment: 


- Continue simvastatin    





(12) Rhabdomyolysis


Comment: 


- Resolved.    





(13) DVT prophylaxis


Comment: 


- HSQ   





(14) Full code status


Current Visit: Yes   Status: Acute   Code(s): Z78.9 - OTHER SPECIFIED HEALTH 

STATUS   SNOMED Code(s): 968670676


   


Status and Disposition: 


Inpatient with LOS > 2 days.  Anticipate nursing home placement when medically 

stable.

## 2017-11-06 PROCEDURE — 5A1D70Z PERFORMANCE OF URINARY FILTRATION, INTERMITTENT, LESS THAN 6 HOURS PER DAY: ICD-10-PCS | Performed by: INTERNAL MEDICINE

## 2017-11-06 RX ADMIN — HEPARIN SODIUM SCH UNITS: 5000 INJECTION INTRAVENOUS; SUBCUTANEOUS at 22:14

## 2017-11-06 RX ADMIN — HEPARIN SODIUM SCH UNITS: 5000 INJECTION INTRAVENOUS; SUBCUTANEOUS at 15:00

## 2017-11-06 RX ADMIN — HEPARIN SODIUM SCH UNITS: 5000 INJECTION INTRAVENOUS; SUBCUTANEOUS at 05:32

## 2017-11-06 RX ADMIN — Medication SCH CAP: at 09:33

## 2017-11-06 RX ADMIN — SODIUM CITRATE AND CITRIC ACID MONOHYDRATE SCH ML: 500; 334 SOLUTION ORAL at 09:33

## 2017-11-06 RX ADMIN — Medication SCH: at 09:44

## 2017-11-06 RX ADMIN — LACTULOSE SCH: 10 SOLUTION ORAL at 11:27

## 2017-11-06 RX ADMIN — HALOPERIDOL LACTATE PRN MG: 5 INJECTION, SOLUTION INTRAMUSCULAR at 05:32

## 2017-11-06 RX ADMIN — ATORVASTATIN CALCIUM SCH: 10 TABLET, FILM COATED ORAL at 16:54

## 2017-11-06 RX ADMIN — BENZONATATE SCH: 100 CAPSULE ORAL at 16:55

## 2017-11-06 RX ADMIN — BENZONATATE SCH MG: 100 CAPSULE ORAL at 09:33

## 2017-11-06 RX ADMIN — BENZONATATE SCH MG: 100 CAPSULE ORAL at 22:14

## 2017-11-06 RX ADMIN — INSULIN LISPRO SCH: 100 INJECTION, SOLUTION INTRAVENOUS; SUBCUTANEOUS at 19:42

## 2017-11-06 RX ADMIN — SODIUM CITRATE AND CITRIC ACID MONOHYDRATE SCH: 500; 334 SOLUTION ORAL at 15:10

## 2017-11-06 RX ADMIN — INSULIN LISPRO SCH: 100 INJECTION, SOLUTION INTRAVENOUS; SUBCUTANEOUS at 11:37

## 2017-11-06 RX ADMIN — LACTULOSE SCH: 10 SOLUTION ORAL at 13:52

## 2017-11-06 RX ADMIN — INSULIN LISPRO SCH: 100 INJECTION, SOLUTION INTRAVENOUS; SUBCUTANEOUS at 09:29

## 2017-11-06 RX ADMIN — SODIUM CITRATE AND CITRIC ACID MONOHYDRATE SCH ML: 500; 334 SOLUTION ORAL at 22:14

## 2017-11-06 RX ADMIN — BENZONATATE SCH: 100 CAPSULE ORAL at 13:52

## 2017-11-06 NOTE — PN
Subjective


Date of Service: 11/06/17


Interval History: 





Mr. Blake is confused but awake and yelling at times.  He does not offer any 

complaint and appears to not be in any acute distress.








Family History: Unchanged from Admission


Social History: Unchanged from Admission


Past Medical History: Unchanged from Admission





Objective


Active Medications: 





Acetaminophen (Tylenol Tab*)  650 mg PO Q4H PRN


Atorvastatin Calcium (Lipitor*)  10 mg PO 1700 ALEJANDRA


Benzonatate (Tessalon Cap*)  100 mg PO QID ALEJANDRA


Citric Acid/Sodium Citrate (Bicitra*)  15 ml PO TID ALEJANDRA


Dextrose (D50w Syringe 50 Ml*)  12.5 gm IV PUSH .FOR FS < 60 - SS PRN


Haloperidol Lactate (Haldol Inj Iv/Im*)  2 mg IV SLOW PU Q6H PRN


Heparin Sodium (Porcine) (Heparin Vial(*))  5,000 units SUBCUT Q8HR ALEJANDRA


Clindamycin HCl/Dextrose (Cleocin 600 Mg Ivpremix(*) Sdv)  600 mg in 50 mls @ 

100 mls/hr IV Q8H ALEJANDRA


Insulin Human Lispro (Humalog*)  0 units SUBCUT AC ALEJANDRA


Lactobacillus Rhamnosus (Culturelle*)  1 cap PO DAILY ALEJANDRA


Lactulose (Lactulose 300 Ml For Pr*)  200 gm NE QID ALEJANDRA


Morphine Sulfate (Morphine Inj (Syringe)*)  2 mg IV Q4H PRN


Ondansetron HCl (Zofran Inj*)  4 mg IV Q4H PRN





 Vital Signs





Vital Signs:











Temp Pulse Resp BP Pulse Ox


 


 97.7 F   80   17   98/60   97 


 


 11/06/17 07:23  11/06/17 06:00  11/06/17 06:00  11/06/17 06:00  11/06/17 06:00











Oxygen Devices in Use Now: High Flow Heated Nasal Cannula


Appearance: Morbidly obese male lying in bed in NAD


Eyes: No Scleral Icterus


Ears/Nose/Mouth/Throat: Mucous Membranes Moist


Neck: Trachea Midline


Respiratory: Symmetrical Chest Expansion and Respiratory Effort, - - Diminished


Cardiovascular: NL Sounds; No Murmurs; No JVD, - - +2-3 edema, significant 

scrotal edema


Abdominal: NL Sounds; No Tenderness; No Distention


Skin: - - Bilateral LEs with chronic venous stasis changes, no clear evidence 

of acute infection with no marked area of erythema or drainage


Neurological: - - Awake, responds to voice and yells out or mumbles.  Moves all 

extremities but is inconsistent in following commands.


Nutrition: Taking PO's


Result Diagrams: 


 11/05/17 20:00





 11/05/17 22:00


Microbiology and Other Data: 


.





Assess/Plan/Problems-Billing





Mr. Blake is a 63yo male with a PMH significant for morbid obesity, CHF, 

paroxysmal Afib, DMII, HTN, HLD, depression, and CKD who presents after a fall 

with prolonged down time with sepsis secondary to a lower leg cellulitis and UTI

, also with mild rhabdomyolysis, prerenal JOSHUA, and elevated troponins now with 

acute renal failure.





- Patient Problems


(1) Acute hypoxemic respiratory failure


Comment: 


- Patient suddenly hypoxic on arrival to ICU last evening.


- Suspect secondary to pulmonary edema, responded well to bipap, now on 

vapotherm.


- Cxray with pulm edema and cardiomegaly, no clear evidence of infiltrate.


- Patient noted to vomit into bipap mask overnight, will be vigilant for signs 

of aspiration pneumonia, vitals stable at this point, currently on levaquin.   





(2) Acute renal failure


Comment: 


- Appreciate nephrology input, events of last evening reviewed with Dr. Hernandez.

  


- Dialysis completed Friday, plan for M/W/F dialysis.


- Patient now on vapotherm after episode of hypoxia last evening, suspect due 

to fluid overload as he responded well to bipap initially.


- Continue bicarb prn for metabolic acidosis.


- Continue kayexalate for hyperkalemia, K 6.0.   





(3) Leukocytosis


Comment: 


- WBC up to 14, I suspect this is due to stress with acute hypoxic resp 

failure. However CRP also mildly elevated.  No fever.


- No clear source of infection other than cellulitis to left lower extremity 

which does not appear to be acutely worsened.


- Given respiratory failure and episode of possible aspiration, blood cultures 

were ordered and antibiotics were broadened.  Patient has multiple allergies 

including cephalosporins, penicillins, and sulfa.  For now, patient will be 

covered with levaquin and increased dose of clindamycin.


- LA remained elevated > 2. Suspect due to respiratory failure.  IV fluids 

contraindicated in setting of renal failure and hypoxic respiratory failure.   





(4) CHF (congestive heart failure)


Comment: 


- EF 40-45 by most recent Echo, Consistent with 11/16 TTE. 


- SBP 90s, metoprolol discontinued.


- Plan for dialysis today.   





(5) Acidosis, metabolic


Comment: 


- Acidosis steadily worsening after dialysis.  


- Patient given 2 amps bicard, awaiting HD today.   





(6) Cellulitis


Comment: 


- No clear evidence of acute cellulitis at this point. Patient has chronic leg 

wound with chronic venous stasis changes. 


- Switch to IV clindamycin as depressed mentation prevents consistent po 

administration. 


- Wash leg daily with soap and water. Cover with gauze.   





(7) Depression


Comment: 


- Since arrival, patient has been intermittently agitated and refusing to 

participate in care.  States, "I just wanna be left alone."


- Psych consult ordered to assess for appropriateness of starting anti-

depressant, will defer until patient is more alert and oriented.


- Spoke to patient's PCP, Dr. Loza.  Patient has "burned all bridges."  He has 

a son who is no longer involved with him, Dr. Loza did not have any contact 

information.   





(8) UTI (urinary tract infection)


Comment: 


- Asymptomatic.  


- Only 10,000 colonies of proteus.


- On clindamycin for LE wound.   





(9) Elevated troponin


Comment: 


- Trop peaked at 0.40. Suspect secondary to demand ischemia. 


- No CP, SOB, or EKG changes. 


- Consider outpatient stress test when recovered from acute illness.   





(10) HTN (hypertension)


Comment: 


- SBP .


- Hold metoprolol.   





(11) Diabetes mellitus


Comment: 


- Controlled. 


- Continue lispro SSI coverage with meals.   





(12) Afib


Comment: 


- Rate controlled.


- Eliquis on hold d/t renal function.  Patient has documented allergy to 

warfarin.  No history of CVA therefore does not need bridging therapy.   





(13) HLD (hyperlipidemia)


Comment: 


- Continue simvastatin    





(14) DVT prophylaxis


Comment: 


- HSQ   





(15) Full code status





Status and Disposition: 


Inpatient with LOS > 2 days.  Anticipate nursing home placement when medically 

stable.

## 2017-11-07 LAB
ALBUMIN SERPL BCG-MCNC: 3.5 G/DL (ref 3.2–5.2)
ALP SERPL-CCNC: 128 U/L (ref 34–104)
ALT SERPL W P-5'-P-CCNC: 10 U/L (ref 7–52)
ANION GAP SERPL CALC-SCNC: 16 MMOL/L (ref 2–11)
AST SERPL-CCNC: 26 U/L (ref 13–39)
BUN SERPL-MCNC: 85 MG/DL (ref 6–24)
BUN/CREAT SERPL: 14.2 (ref 8–20)
CALCIUM SERPL-MCNC: 9.8 MG/DL (ref 8.6–10.3)
CHLORIDE SERPL-SCNC: 95 MMOL/L (ref 101–111)
GLOBULIN SER CALC-MCNC: 3.7 G/DL (ref 2–4)
GLUCOSE SERPL-MCNC: 79 MG/DL (ref 70–100)
HCO3 SERPL-SCNC: 20 MMOL/L (ref 22–32)
HCT VFR BLD AUTO: 35 % (ref 42–52)
HGB BLD-MCNC: 11.4 G/DL (ref 14–18)
MCH RBC QN AUTO: 29 PG (ref 27–31)
MCHC RBC AUTO-ENTMCNC: 32 G/DL (ref 31–36)
MCV RBC AUTO: 89 FL (ref 80–94)
POTASSIUM SERPL-SCNC: 5.2 MMOL/L (ref 3.5–5)
PROT SERPL-MCNC: 7.2 G/DL (ref 6.4–8.9)
RBC # BLD AUTO: 3.96 10^6/UL (ref 4–5.4)
SODIUM SERPL-SCNC: 131 MMOL/L (ref 133–145)
WBC # BLD AUTO: 15.1 10^3/UL (ref 3.5–10.8)

## 2017-11-07 RX ADMIN — ONDANSETRON PRN MG: 2 INJECTION INTRAMUSCULAR; INTRAVENOUS at 13:37

## 2017-11-07 RX ADMIN — HALOPERIDOL LACTATE PRN MG: 5 INJECTION, SOLUTION INTRAMUSCULAR at 03:17

## 2017-11-07 RX ADMIN — INSULIN LISPRO SCH: 100 INJECTION, SOLUTION INTRAVENOUS; SUBCUTANEOUS at 13:10

## 2017-11-07 RX ADMIN — ONDANSETRON PRN MG: 2 INJECTION INTRAMUSCULAR; INTRAVENOUS at 07:53

## 2017-11-07 RX ADMIN — HEPARIN SODIUM SCH UNITS: 5000 INJECTION INTRAVENOUS; SUBCUTANEOUS at 22:12

## 2017-11-07 RX ADMIN — BENZONATATE SCH: 100 CAPSULE ORAL at 08:19

## 2017-11-07 RX ADMIN — SODIUM CITRATE AND CITRIC ACID MONOHYDRATE SCH ML: 500; 334 SOLUTION ORAL at 22:12

## 2017-11-07 RX ADMIN — ATORVASTATIN CALCIUM SCH: 10 TABLET, FILM COATED ORAL at 17:12

## 2017-11-07 RX ADMIN — INSULIN LISPRO SCH: 100 INJECTION, SOLUTION INTRAVENOUS; SUBCUTANEOUS at 08:19

## 2017-11-07 RX ADMIN — SODIUM CITRATE AND CITRIC ACID MONOHYDRATE SCH ML: 500; 334 SOLUTION ORAL at 08:19

## 2017-11-07 RX ADMIN — BENZONATATE SCH: 100 CAPSULE ORAL at 17:12

## 2017-11-07 RX ADMIN — MORPHINE SULFATE PRN MG: 2 INJECTION, SOLUTION INTRAMUSCULAR; INTRAVENOUS at 16:36

## 2017-11-07 RX ADMIN — PANTOPRAZOLE SODIUM SCH MG: 40 INJECTION, POWDER, FOR SOLUTION INTRAVENOUS at 11:27

## 2017-11-07 RX ADMIN — Medication SCH CAP: at 08:19

## 2017-11-07 RX ADMIN — HEPARIN SODIUM SCH UNITS: 5000 INJECTION INTRAVENOUS; SUBCUTANEOUS at 13:04

## 2017-11-07 RX ADMIN — BENZONATATE SCH MG: 100 CAPSULE ORAL at 22:12

## 2017-11-07 RX ADMIN — BENZONATATE SCH: 100 CAPSULE ORAL at 13:10

## 2017-11-07 RX ADMIN — INSULIN LISPRO SCH: 100 INJECTION, SOLUTION INTRAVENOUS; SUBCUTANEOUS at 19:24

## 2017-11-07 RX ADMIN — HEPARIN SODIUM SCH UNITS: 5000 INJECTION INTRAVENOUS; SUBCUTANEOUS at 05:48

## 2017-11-07 RX ADMIN — SODIUM CITRATE AND CITRIC ACID MONOHYDRATE SCH: 500; 334 SOLUTION ORAL at 13:11

## 2017-11-07 RX ADMIN — LEVOFLOXACIN SCH MLS/HR: 5 INJECTION, SOLUTION INTRAVENOUS at 22:12

## 2017-11-07 NOTE — PN
Subjective


Date of Service: 11/07/17


Interval History: 





Overnight events noted, patient pulled out HD catheter. 


Mr. Blake had episode of N/V earlier this morning. Reports feeling better now. 

Still seems very encephalopathic, unable to tell me where he is or why he is 

here. Denies pain or SOB. O2 weaned.


Family History: Unchanged from Admission


Social History: Unchanged from Admission


Past Medical History: Unchanged from Admission





Objective


Active Medications: 








Acetaminophen (Tylenol Tab*)  650 mg PO Q4H PRN


Atorvastatin Calcium (Lipitor*)  10 mg PO 1700 ALEJANDRA


Benzonatate (Tessalon Cap*)  100 mg PO QID ALEJANDRA


Citric Acid/Sodium Citrate (Bicitra*)  15 ml PO TID ALEJANDRA


Dextrose (D50w Syringe 50 Ml*)  12.5 gm IV PUSH .FOR FS < 60 - SS PRN


Haloperidol Lactate (Haldol Inj Iv/Im*)  2 mg IV SLOW PU Q6H PRN


Heparin Sodium (Porcine) (Heparin Vial(*))  5,000 units SUBCUT Q8HR ALEJANDRA


Clindamycin HCl/Dextrose (Cleocin 600 Mg Ivpremix(*) Sdv)  600 mg in 50 mls @ 

100 mls/hr IV Q8H ALEJANDRA


Levofloxacin/Dextrose (Levaquin 500 Mg Ivpremix(*))  500 mg in 100 mls @ 100 mls

/hr IVPB Q48H ALEJANDRA


Insulin Human Lispro (Humalog*)  0 units SUBCUT AC ALEJANDRA


Lactobacillus Rhamnosus (Culturelle*)  1 cap PO DAILY ALEJANDRA


Lactulose (Lactulose*)  30 ml PO TID ALEJANDRA


Morphine Sulfate (Morphine Inj (Syringe)*)  2 mg IV Q4H PRN


Ondansetron HCl (Zofran Inj*)  4 mg IV Q4H PRN





 Vital Signs











  11/06/17 11/06/17 11/06/17





  10:00 10:06 10:47


 


Temperature   


 


Pulse Rate 88  93


 


Respiratory 19 16 16





Rate   


 


Blood Pressure   78/58





(mmHg)   


 


O2 Sat by Pulse 97  97





Oximetry   














  11/06/17 11/06/17 11/06/17





  12:00 12:03 12:15


 


Temperature   


 


Pulse Rate 88 84 85


 


Respiratory 15 17 15





Rate   


 


Blood Pressure 80/59 81/48 73/47





(mmHg)   


 


O2 Sat by Pulse 96 97 96





Oximetry   














  11/06/17 11/07/17 11/07/17





  23:56 00:00 00:01


 


Temperature   


 


Pulse Rate  85 82


 


Respiratory 18 18 17





Rate   


 


Blood Pressure   119/54





(mmHg)   


 


O2 Sat by Pulse  97 96





Oximetry   














  11/07/17 11/07/17 11/07/17





  08:00 08:01 08:03


 


Temperature 96.7 F  


 


Pulse Rate 96 93 85


 


Respiratory 22 18 14





Rate   


 


Blood Pressure   110/78





(mmHg)   


 


O2 Sat by Pulse 96 95 95





Oximetry   











Oxygen Devices in Use Now: Nasal Cannula


Appearance: Middle-aged,  M, laying in bed, lethargic


Eyes: No Scleral Icterus


Ears/Nose/Mouth/Throat: - - Dry MM


Neck: NL Appearance and Movements; NL JVP


Respiratory: Symmetrical Chest Expansion and Respiratory Effort, Clear to 

Auscultation - in anterior and lateral fields


Cardiovascular: NL Sounds; No Murmurs; No JVD, RRR


Abdominal: - - Obese, soft, non-distended, non-tender, BS+


Extremities: - - Diffuse anasarca


Skin: - - Chronic LE skin changes, scrotal edema


Neurological: - - Lethargic, oriented to self only, could not tell me where he 

was, thought year was 1977, no focal deficits noted


Result Diagrams: 


 11/07/17 06:10





 11/07/17 06:10


Microbiology and Other Data: 


.





Assess/Plan/Problems-Billing





Mr. Blake is a 63yo male with a PMH significant for morbid obesity, CHF, 

paroxysmal Afib, DMII, HTN, HLD, depression, and CKD who presents after a fall 

with prolonged down time with sepsis secondary to a lower leg cellulitis and UTI

, also with mild rhabdomyolysis, prerenal JOSHUA, and elevated troponins now with 

acute renal failure requiring HD





- Patient Problems


(1) Acute hypoxemic respiratory failure


Current Visit: Yes   Comment: 


- Patient suddenly hypoxic on arrival to ICU on 11/6, unclear etiology. Some 

concern for PE. Will get B/L LE dopplers. CTA would likely prolong patient's HD 

needs and VQ scan would not be helpful. O2 needs improving, continue to wean.


- Patient noted to vomit into bipap mask 11/6, will be vigilant for signs of 

aspiration pneumonia, Currently on levaquin in addition to Clinda   





(2) Acute renal failure


Current Visit: Yes   Comment: 


- Appreciate nephrology input. Last HD on 11/6. Unfortunately patient pulled 

out his temporary HD cath


- Spoke to Dr. Rice and will plan for placement of tunneled HD cath on 11/8


- Continue bicitra for metabolic acidosis.   





(3) Cellulitis


Current Visit: Yes   Comment: 


- No clear evidence of acute cellulitis at this point. Patient has chronic leg 

wound with chronic venous stasis changes. 


- Continue IV clindamycin as depressed mentation prevents consistent po 

administration. 


- Wash leg daily with soap and water. Cover with gauze.   





(4) Depression


Current Visit: Yes   Comment: 


- Since arrival, patient has been intermittently agitated and refusing to 

participate in care.  States, "I just wanna be left alone."


- Psych consult ordered to assess for appropriateness of starting anti-

depressant, will defer until patient is more alert and oriented.


- Spoke to patient's PCP, Dr. Loza.  Patient has "burned all bridges."  He has 

a son who is no longer involved with him, Dr. Loza did not have any contact 

information.   





(5) Elevated troponin


Current Visit: Yes   Comment: 


- Trop peaked at 0.40. Suspect secondary to demand ischemia. 


- No CP, SOB, or EKG changes. 


- Consider outpatient stress test when recovered from acute illness.   





(6) HTN (hypertension)


Current Visit: Yes   Comment: 


- Holding metoprolol.   





(7) Afib


Current Visit: Yes   Comment: 


- Rate controlled at the moment, currently off medications


- Eliquis on hold d/t renal function.  Patient has documented allergy to 

warfarin.   





(8) Diabetes mellitus


Current Visit: Yes   Comment: 


- Not taking much PO due to nausea


- Continue lispro SSI coverage with meals.   





(9) CHF (congestive heart failure)


Current Visit: Yes   Comment: 


- EF 40-45 by most recent Echo, Consistent with 11/16 TTE. 


- Metoprolol held for relative hypotension   





(10) DVT prophylaxis


Current Visit: Yes   Comment: 


- HSQ   


Status and Disposition: 


Inpatient with LOS > 2 days.  Anticipate nursing home placement when medically 

stable.

## 2017-11-07 NOTE — RAD
INDICATION:  Lower extremity edema evaluate for deep venous thrombosis.



COMPARISON:  Comparison is made with a prior study from October 25, 2017.



TECHNIQUE:  Multiple real-time, color flow and Doppler tracings of both lower extremities

were obtained. The exam is limited due to the patient's body habitus.



FINDINGS: The common femoral, femoral, profunda femoral and popliteal veins all

demonstrate normal compressibility, augmentation with compression and phasic response with

respiration.



The posterior tibial and peroneal veins demonstrate normal compressibility and

augmentation with compression.



IMPRESSION: SLIGHTLY LIMITED EXAM, NO EVIDENCE FOR DEEP VENOUS THROMBOSIS.

## 2017-11-07 NOTE — RAD
Indication: Request for assessment of the bilateral internal jugular veins; procedure

planning for dialysis catheter placement.



Comparison: No relevant prior exams available on the [facility] PACS for comparison.



Technique: Ultrasound with Doppler of the bilateral internal jugular and subclavian veins.



Report: Patent bilateral internal jugular and subclavian veins documented.

## 2017-11-08 LAB
ANION GAP SERPL CALC-SCNC: 16 MMOL/L (ref 2–11)
BUN SERPL-MCNC: 95 MG/DL (ref 6–24)
BUN/CREAT SERPL: 15 (ref 8–20)
CALCIUM SERPL-MCNC: 9.5 MG/DL (ref 8.6–10.3)
CHLORIDE SERPL-SCNC: 96 MMOL/L (ref 101–111)
GLUCOSE SERPL-MCNC: 71 MG/DL (ref 70–100)
HCO3 SERPL-SCNC: 17 MMOL/L (ref 22–32)
HCT VFR BLD AUTO: 35 % (ref 42–52)
HGB BLD-MCNC: 11.1 G/DL (ref 14–18)
MAGNESIUM SERPL-MCNC: 2.4 MG/DL (ref 1.9–2.7)
MCH RBC QN AUTO: 29 PG (ref 27–31)
MCHC RBC AUTO-ENTMCNC: 32 G/DL (ref 31–36)
MCV RBC AUTO: 90 FL (ref 80–94)
POTASSIUM SERPL-SCNC: (no result) MMOL/L (ref 3.5–5)
RBC # BLD AUTO: 3.87 10^6/UL (ref 4–5.4)
SODIUM SERPL-SCNC: 129 MMOL/L (ref 133–145)
WBC # BLD AUTO: 14.2 10^3/UL (ref 3.5–10.8)

## 2017-11-08 PROCEDURE — 5A1D70Z PERFORMANCE OF URINARY FILTRATION, INTERMITTENT, LESS THAN 6 HOURS PER DAY: ICD-10-PCS | Performed by: INTERNAL MEDICINE

## 2017-11-08 PROCEDURE — 02HV33Z INSERTION OF INFUSION DEVICE INTO SUPERIOR VENA CAVA, PERCUTANEOUS APPROACH: ICD-10-PCS | Performed by: RADIOLOGY

## 2017-11-08 PROCEDURE — B518ZZA FLUOROSCOPY OF SUPERIOR VENA CAVA, GUIDANCE: ICD-10-PCS | Performed by: RADIOLOGY

## 2017-11-08 PROCEDURE — 0JH63XZ INSERTION OF TUNNELED VASCULAR ACCESS DEVICE INTO CHEST SUBCUTANEOUS TISSUE AND FASCIA, PERCUTANEOUS APPROACH: ICD-10-PCS | Performed by: RADIOLOGY

## 2017-11-08 RX ADMIN — INSULIN LISPRO SCH: 100 INJECTION, SOLUTION INTRAVENOUS; SUBCUTANEOUS at 11:56

## 2017-11-08 RX ADMIN — SODIUM CITRATE AND CITRIC ACID MONOHYDRATE SCH ML: 500; 334 SOLUTION ORAL at 08:51

## 2017-11-08 RX ADMIN — HEPARIN SODIUM SCH UNITS: 5000 INJECTION INTRAVENOUS; SUBCUTANEOUS at 05:10

## 2017-11-08 RX ADMIN — BENZONATATE SCH: 100 CAPSULE ORAL at 16:12

## 2017-11-08 RX ADMIN — INSULIN LISPRO SCH: 100 INJECTION, SOLUTION INTRAVENOUS; SUBCUTANEOUS at 09:02

## 2017-11-08 RX ADMIN — SODIUM CITRATE AND CITRIC ACID MONOHYDRATE SCH ML: 500; 334 SOLUTION ORAL at 15:48

## 2017-11-08 RX ADMIN — ATORVASTATIN CALCIUM SCH MG: 10 TABLET, FILM COATED ORAL at 17:30

## 2017-11-08 RX ADMIN — BENZONATATE SCH MG: 100 CAPSULE ORAL at 08:51

## 2017-11-08 RX ADMIN — Medication SCH CAP: at 08:51

## 2017-11-08 RX ADMIN — INSULIN LISPRO SCH: 100 INJECTION, SOLUTION INTRAVENOUS; SUBCUTANEOUS at 17:31

## 2017-11-08 RX ADMIN — RIFAXIMIN SCH MG: 550 TABLET ORAL at 22:16

## 2017-11-08 RX ADMIN — HEPARIN SODIUM SCH UNITS: 5000 INJECTION INTRAVENOUS; SUBCUTANEOUS at 22:16

## 2017-11-08 RX ADMIN — PANTOPRAZOLE SODIUM SCH MG: 40 INJECTION, POWDER, FOR SOLUTION INTRAVENOUS at 15:48

## 2017-11-08 RX ADMIN — BENZONATATE SCH MG: 100 CAPSULE ORAL at 22:16

## 2017-11-08 RX ADMIN — BENZONATATE SCH MG: 100 CAPSULE ORAL at 17:30

## 2017-11-08 RX ADMIN — SODIUM CITRATE AND CITRIC ACID MONOHYDRATE SCH ML: 500; 334 SOLUTION ORAL at 22:16

## 2017-11-08 RX ADMIN — HEPARIN SODIUM SCH UNITS: 5000 INJECTION INTRAVENOUS; SUBCUTANEOUS at 15:48

## 2017-11-08 RX ADMIN — RIFAXIMIN SCH MG: 550 TABLET ORAL at 16:15

## 2017-11-08 NOTE — RAD
CPT II Codes: 6045F 



Procedures performed:

Placement of a right internal jugular vein tunneled hemodialysis catheter with ultrasound

and fluoroscopic guidance.



Date of service: November 08, 2017



Indication for procedure: Acute renal failure



Comparison: Jugular vein ultrasound November 07, 2017



Contrast: None



Fluoroscopy Time: 1 minute 30 seconds



Vessels Accessed: 

Percutaneous access was obtained with ultrasound guidance in the right internal jugular

vein towards the cavoatrial junction.



Anesthesia: Conscious sedation with IV Fentanyl and Versed as well as local 1% lidocaine

injected locally at the venotomy site.



Conscious sedation time:

Timeout: 1415 hours

Case end: 1500 hours

Total conscious sedation time: 45 minutes



Additional medications:



Heparin 100 units per mL injected into each catheter lumen according to 's

recommendation.



Procedure narration and imaging findings: 



Emergency consent was provided by the attending ICU DrDavon as the patient was not oriented to

time in place and a surrogate could not be contacted in a timely manner.



Preoperative ultrasound of the right internal jugular vein demonstrated the vein to patent

and compressible. Representative images were saved. 



The patient was placed in the supine position in the fluoroscopy suite and the neck and

upper chest was prepped and draped according to standard sterile protocol. A formal time

out was performed by Dr. Rice in the presence of the IR staff and all agreed on the

patient, procedure and laterality. 



The skin overlying the jugular vein and upper chest was anesthetized with 1% lidocaine.

Real time ultrasound imaging shows the internal jugular vein is patent and determined to

be adequate for catheter placement. Utilizing real time ultrasound visualization the

internal jugular vein was accessed with an 18 gauge needle. An image was recorded and

saved confirming appropriate intraluminal position of the needle tip. Blood return further

confirmed position. 



Through the needle and under fluoroscopic control a 0.035 inch guidewire was advanced

below the diaphragm into the IVC confirming appropriate venous access. An image of the

wire in the IVC was recorded. The wire tip was then positioned in the cavoatrial junction

and the length of intravascular wire was measured. Then the percutaneous catheter exit

site was selected at the upper chest appropriate for the tip-to-cuff and tip-to-hub

lengths. 



Over the access wire the internal jugular venotomy was serially dilated culminating with

placement of the peel away sheath. A skin nick was created at the previously selected

upper chest skin exit site and the catheter was tunnelled subcutaneously with a blunt

tunnelling device from the skin nick to the venotomy site. The tunnelling device was

removed and the catheter was advanced into the peel away sheath under fluoroscopic

control. 



The sheath was peeled away and the catheter tip was pulled back so that it terminated

approximately at the cavoatrial junction. Each lumen of the catheter was tested for

adequate blood flow, flushed with sterile saline and finally filled with an appropriate

volume of 100 units/mL of Heparin according to the device specifications. 



A fluoroscopic image was saved demonstrating appropriate position of the entire length of

the hemodialysis catheter.

 

The venotomy site was sutured closed with a single buried absorbable suture. A "purse

string" suture was tied around the upper chest catheter exit site with non-absorbable

suture. The catheter was secured to the skin with additional non-absorbable sutures. The

venotomy and catheter exit site were dressed with sterile gauze and Tegaderm.

 

The patient tolerated the procedure well and left the fluoroscopy suite in stable

condition for one hour of observation prior to discharge. 





SUMMARY OF PROCEDURE, IMAGING FINDINGS AND INTERVENTIONS PERFORMED:



1. Diagnostic studies performed:

* Venous access was obtained at the right internal jugular vein in the antegrade direction

(i.e. towards the heart) with ultrasound guidance. A sonographic image was recorded.



2. Interpretation of diagnostic studies performed:

Evaluation of the venotomy site with sonography demonstrated the vessel to be patent and

adequately sized for catheter placement.



3. Surgical interventions performed:

*  Placement of a right internal jugular vein 15.5-[employer] Duramax tunneled

hemodialysis catheter with ultrasound and fluoroscopic guidance.



4. Interpretation of interventions performed:

*  Final fluoroscopic image demonstrates the catheter to be in appropriate position with

the catheter tips at the cavoatrial junction.





PLAN:

1. The hemodialysis catheter may be accessed immediately for hemodialysis.

2. The "pursestring" suture tied around the catheter exit site should be removed 5-7 days

post procedure. If this cannot be done at the hemodialysis center please refer the patient

back to Interventional Radiology for suture removal.

## 2017-11-08 NOTE — CONS
CC:  Tino Loza DO;  Tomy Garcia MD. *

 

PALLIATIVE CONSULTATION REPORT:

 

DATE OF CONSULTATION:  11/08/17

 

PRIMARY CARE PHYSICIAN:  Tino Loza DO.

 

REFERRING PHYSICIAN:  Tomy Garcia MD

 

HOSPITAL COURSE:  This is a 62-year-old male with past medical history of 
diabetes, CHF, atrial fibrillation, and a history of CKD, who presented to the 
emergency room on 25th after having a fall.  Per report, the patient is 
wheelchair bound.  He states he does ambulate some.  He is independent of his 
ADLs.  He was doing his laundry when he fell out of his chair and he was unable 
to get up.  He was transferred from [place] for acute kidney injury.  On 
arrival here in the emergency room, he was being evaluated for sepsis that was 
secondary to cellulitis and he was admitted and placed on antibiotics.  The 
patient was seen by Infectious Diseases and they started him on clindamycin for 
lower extremity lymphedema with venous stasis changes and cellulitis.  The 
patient was also noted to have a NSTEMI. He had an echocardiogram done that was 
unremarkable; it was thought that his NSTEMI was due to demand ischemia from 
sepsis.  He was also seen in consultation by urology, Dr. Lezama, for concern 
for urinary retention and followup with this urinary retention was not in fact 
an issue, but more ascites that was seen.  Vega catheter was placed.  
Nephrology, Dr. Hernandez also evaluated the patient on the 31st for his worsening 
renal failure, recommended albumin infusion, and that he would likely need 
dialysis during this hospitalization.  The patient was planned to have dialysis 
on the 3rd.  On the evening of the 5th, patient became belligerent, disoriented
, confused.  He was transferred to the ICU for further workup and evaluation.  
On arrival to the ICU, patient was persistently hypoxic and had to be placed on 
BiPAP.  His labs showed metabolic acidosis and also noted that he had an 
elevated ammonia level.  He has been since treated with lactulose and he has 
continued to still have issues with altered mental status.  He pulled out his 
dialysis catheter.  He has plans later today for a tunneled catheter.  This 
morning, he seems to be more alert and oriented.  On my encounter, he is alert 
and oriented x3.  He is aware of his renal failure.  When speaking to him him 
about his hospital course, he said that it was very important for him to get 
back home to his apartment.  I discussed my concern that he will likely need 
rehab and he may never return, given his comorbidities and his prolonged 
hospitalization.  I spoke with the patient at length regarding his code status.
  He wishes to be a full code.  He did designate his ex-wife Thea to be his 
healthcare proxy and we will get this documented as well.  I spoke with him 
regarding hospice eligibility with his respiratory failure secondary to volume 
overload, the fact that it has not improved with dialysis that he should 
consider hospice.  The patient was not interested in speaking further regarding 
this.  He currently denies any pain.  No shortness of breath.  No nausea or 
vomiting.  States he is comfortable.  Otherwise remainder review of systems is 
negative.

 

PAST MEDICAL HISTORY:

1.  Diabetes.

2.  CHF with diastolic dysfunction.

3.  Atrial fibrillation.

4.  Hypertension.

5.  Hyperlipidemia.

6.  Depression.

7.  Irritable bowel syndrome.

8.  History of MRSA.

9.  CKD.

10.  History of diverticulitis.

 

PAST SURGICAL HISTORY:

1.  History of bowel resection with colostomy placed secondary to 
diverticulitis now reversal.

2.  History of knee surgery bilaterally.

 

PROBLEM LIST:

1.  Acute hypoxic respiratory failure.

2.  Acute on chronic renal failure.

3.  Cellulitis.

4.  Depression.

 

INPATIENT MEDICATIONS:

1.  Tylenol 650 mg every 6 hours as needed.

2.  Atorvastatin 10 mg daily.

3.  Benzonatate 100 mg p.o. q.i.d.

4.  Clindamycin 650 every 8 hours.

5.  Haldol 2 mg q. 6 hours as needed.

6.  Heparin 5000 units subcu t.i.d.

7.  Lispro sliding scale, lactobacillus 1 cap daily.

8.  Lactulose 30 mL p.o. t.i.d.

9.  Levaquin 500 mg q. 48 hours.

10.  Morphine 2 mg IV q. 4 hours as needed.

11.  Zofran 4 mg IV q. 4 hours as needed.

12.  Pantoprazole 40 mg q. 24.

13.  Prochlorperazine 5 mg IV q. 6 hours as needed.

14.  Rifaximin 550 mg p.o. b.i.d.

15.  Sodium citric acid 15 mL p.o. b.i.d.

 

ALLERGIES:  KEFLEX, PENICILLIN, XARELTO, SULFA, WARFARIN.

 

FAMILY HISTORY:  Reviewed and noncontributory.

 

SOCIAL HISTORY:  As mentioned, the patient was living in an apartment 
independent of his ADLs.  He was wheelchair bound.  He was able to ambulate 
some.  He is a former smoker, quit 8 months ago.  No alcohol or illicit drug 
use.  He lives alone. His surrogate decision maker is his ex-wife Thea.

 

CODE STATUS:  Discussed for code status, he is a full code.

 

REVIEW OF SYSTEMS:  A 14-point review of systems as mentioned in the HPI, 
pertinent positives and negatives are otherwise negative.

 

PHYSICAL EXAM:  Vitals:  Temp 97.4, pulse rate 90, respiratory rate 14, oxygen 
saturation 96% on 5 L, blood pressure 104/58.  General:  No acute distress, 
morbidly obese male.  HEENT:  Head normocephalic.  Pupils are equal, reactive, 
and anicteric.  Oropharynx and mucous membranes are dry.  Neck:  Supple.  No 
lymphadenopathy.  Cardiac:  Regular rate and rhythm.  Soft systolic murmurs 
heard. Respiratory:  Poor aeration.  Noted to have abdominal breathing.  No 
increased work of breathing.  Abdomen:  Morbidly obese.  Soft, nondistended, 
and nontender. Extremities:  The patient with chronic venous stasis, lymphedema
, and more erythema and his left lower extremity with skin peeling noted.  No 
fluctuance or induration. Distal pulses.  Neurologic:  Alert and oriented x3.  
No gross focal neurological deficits.

 

DIAGNOSTIC STUDIES/LAB DATA:  White count 14.2, hemoglobin 11.1, hematocrit 35, 
platelets 167.  Sodium 129, potassium 5.6, chloride 96, bicarb 17, BUN 95, 
creatinine 6.32.  Albumin on admission noted to be 1.7, troponin peak at 0.4.

 

ASSESSMENT/PLAN:  This is a 62-year-old male with a past medical history of 
morbid obesity, diabetes, congestive heart failure, atrial fibrillation, and 
chronic kidney disease, who presented to the emergency room on the 25th after 
having a fall, found to be septic from cellulitis.  He has had progressive 
worsening renal failure requiring dialysis.  Hospital course complicated by 
altered mental status, delirium, now in the ICU with hypoxic respiratory 
failure.  His mentation does seem to have improved.  He has plans to get a 
tunneled catheter for dialysis.  I spoke with him regarding his code status, he 
would like to be a full code.  I discussed with him his prognosis, my concern 
that he will never get back to independent living, and that he will at least 
need a minimum of acute short term rehab.  I spoke with him regarding hospice 
eligibility that with his respiratory failure, if it does not improve with 
dialysis treatment that he would be eligible for hospice. He was not interested 
in discussing this further.  He did state that I could call his ex-wife, his 
healthcare proxy, Thea to update her and inform her of my recommendations and 
my concerns.  However, I was not able to reach her and was unable to leave a 
message on the cellphone number that was provided.  The patient is going to 
fill up a healthcare proxy form, now that he does have capacity to make his own 
medical decisions.  I would recommend readdressing his code status throughout 
his hospitalization and if his respiratory status does not improve, despite 
getting dialysis, I am happy to come back and readdress options for hospice 
with him and his ex-wife.  Currently, the patient remains asymptomatic.

 

Thank you for this consultation.  I will follow along with you.

 

TIME SPENT:  Greater than 100 minutes spent doing the consultation, more than 
half time was spent in direct patient contact.

 

 [id number]/CPS #: [id number]

MARIBEL

## 2017-11-08 NOTE — PN
Subjective


Date of Service: 11/08/17


Interval History: 





Patient seen this morning, much more alert today. Denies any complaints, no 

pain or SOB. Understands plans for HD cath replacement and need for dialysis. 

He states he has been on dialysis in the past but did not think it extended 

beyond his hospitalization. Of note, review of labs from [facility] indicate 

presenting labs with creatinine of 3 and CK of 1090. 


Records from [facility] reviewed, patient was hospitalized for acute on 

chronic CHF, hypercarbic respiratory failure, diffuse anasarca, liver failure 

with ascites (8L paracentesis there), VRE SBP and acute on chronic renal 

failure. No mention of HD in the d/c summary and patient was discharged on 

diuretics. 


Family History: Unchanged from Admission


Social History: Unchanged from Admission


Past Medical History: Unchanged from Admission





Objective


Active Medications: 








Acetaminophen (Tylenol Tab*)  650 mg PO Q4H PRN


Atorvastatin Calcium (Lipitor*)  10 mg PO 1700 ALEJANDRA


Benzonatate (Tessalon Cap*)  100 mg PO QID ALEJANDRA


Citric Acid/Sodium Citrate (Bicitra*)  15 ml PO TID ALEJANDRA


Dextrose (D50w Syringe 50 Ml*)  12.5 gm IV PUSH .FOR FS < 60 - SS PRN


Haloperidol Lactate (Haldol Inj Iv/Im*)  2 mg IV SLOW PU Q6H PRN


Heparin Sodium (Porcine) (Heparin Vial(*))  5,000 units SUBCUT Q8HR ALEJANDRA


Clindamycin HCl/Dextrose (Cleocin 600 Mg Ivpremix(*) Sdv)  600 mg in 50 mls @ 

100 mls/hr IV Q8H ALEJANDRA


Levofloxacin/Dextrose (Levaquin 500 Mg Ivpremix(*))  500 mg in 100 mls @ 100 mls

/hr IVPB Q48H ALEJANDRA


Insulin Human Lispro (Humalog*)  0 units SUBCUT AC ALEJANDRA


Lactobacillus Rhamnosus (Culturelle*)  1 cap PO DAILY ALEJANDRA


Lactulose (Lactulose*)  30 ml PO TID ALEJANDRA


Morphine Sulfate (Morphine Inj (Syringe)*)  2 mg IV Q4H PRN


Ondansetron HCl (Zofran Inj*)  4 mg IV Q4H PRN


Pantoprazole Sodium (Protonix Iv*)  40 mg IV Q24H ALEJANDRA


Prochlorperazine Edisylate (Compazine Inj*)  5 mg IV Q6H PRN





 Vital Signs











  11/07/17 11/07/17 11/07/17





  09:22 09:30 09:36


 


Temperature   


 


Pulse Rate 93 87 


 


Respiratory 17 14 20





Rate   


 


Blood Pressure 105/73 120/57 





(mmHg)   


 


O2 Sat by Pulse 94 95 





Oximetry   














  11/07/17 11/07/17 11/07/17





  19:49 20:00 20:30


 


Temperature 98.2 F  


 


Pulse Rate  82 82


 


Respiratory  15 15





Rate   


 


Blood Pressure  121/73 104/73





(mmHg)   


 


O2 Sat by Pulse  96 96





Oximetry   














  11/08/17 11/08/17 11/08/17





  06:01 06:17 06:30


 


Temperature   


 


Pulse Rate 91 92 90


 


Respiratory 14 14 14





Rate   


 


Blood Pressure 85/52 87/53 104/58





(mmHg)   


 


O2 Sat by Pulse 95 96 96





Oximetry   











Oxygen Devices in Use Now: Nasal Cannula - 5L


Appearance: Middle-aged,  M, laying in bed in NAD


Eyes: No Scleral Icterus


Ears/Nose/Mouth/Throat: Mucous Membranes Moist


Neck: NL Appearance and Movements; NL JVP


Respiratory: Symmetrical Chest Expansion and Respiratory Effort, - - Diminished 

in bases


Cardiovascular: NL Sounds; No Murmurs; No JVD, RRR


Abdominal: - - Obese, soft, non-tender, non-distended, BS+, abdominal scar


Extremities: - - Diffuse anasarca with weeping


Skin: - - Chronic LE skin changes


Neurological: - - Alert, oriented to self, place, year and date (with help from 

info on whiteboard)


Result Diagrams: 


 11/08/17 04:57





 11/08/17 06:25


Microbiology and Other Data: 


.





Assess/Plan/Problems-Billing





Mr. Blake is a 61yo male with a PMH significant for morbid obesity, CHF, 

paroxysmal Afib, DMII, HTN, HLD, depression, and CKD who presents after a fall 

with prolonged down time with sepsis secondary to a lower leg cellulitis and UTI

, also with mild rhabdomyolysis, prerenal JOSHUA, and elevated troponins now with 

acute renal failure requiring HD





- Patient Problems


(1) Acute renal failure


Current Visit: Yes   Comment: 


- Appreciate nephrology input. Last HD on 11/6. Plan for placement of tunneled 

HD cath today by IR and HD session later today, 11/8


- Continue bicitra for metabolic acidosis.   





(2) Encephalopathy


Current Visit: Yes   Comment: Seems to be slowly improving. Likely combination 

of uremia, hyperammonemia, prolonged hospitalization/ICU stay. Continue 

lactulose, will add Rifaximin which he has been on in the past. HD today.    





(3) Acute hypoxemic respiratory failure


Current Visit: Yes   Comment: 


- Patient suddenly hypoxic on arrival to ICU on 11/6, unclear etiology. Some 

concern for PE. Dopplers with no evidence of DVT. CTA would likely prolong 

patient's HD needs and VQ scan would not be helpful. O2 needs improving, 

continue to wean.


- Patient noted to vomit into bipap mask 11/6, will be vigilant for signs of 

aspiration pneumonia, Currently on levaquin in addition to Clinda   





(4) Cellulitis


Current Visit: Yes   Comment: 


- No clear evidence of acute cellulitis at this point. Patient has chronic leg 

wound with chronic venous stasis changes. 


- Continue IV clindamycin as depressed mentation prevents consistent po 

administration. 


- Wash leg daily with soap and water. Cover with gauze.   





(5) Depression


Current Visit: Yes   Comment: 


- Since arrival, patient has been intermittently agitated and refusing to 

participate in care.  States, "I just wanna be left alone."


- Psych consult ordered to assess for appropriateness of starting anti-

depressant, will defer until patient is more alert and oriented.


- Spoke to patient's PCP, Dr. Loza.  Patient has "burned all bridges."  He has 

a son who is no longer involved with him, Dr. Loza did not have any contact 

information.   





(6) Elevated troponin


Current Visit: Yes   Comment: 


- Trop peaked at 0.40. Suspect secondary to demand ischemia. 


- No CP, SOB, or EKG changes. 


- Consider outpatient stress test when recovered from acute illness.   





(7) HTN (hypertension)


Current Visit: Yes   Comment: 


- Holding metoprolol.   





(8) Afib


Current Visit: Yes   Comment: 


- Rate controlled at the moment, currently off medications


- Eliquis on hold d/t renal function.  Patient has documented allergy to 

warfarin.   





(9) Diabetes mellitus


Current Visit: Yes   Comment: 


- Not taking much PO


- Continue lispro SSI coverage with meals.   





(10) CHF (congestive heart failure)


Current Visit: Yes   Comment: 


- EF 40-45 by most recent Echo, Consistent with 11/16 TTE. 


- Metoprolol held for relative hypotension   





(11) DVT prophylaxis


Current Visit: Yes   Comment: 


- HSQ   


Status and Disposition: 


Inpatient with LOS > 2 days.  Anticipate nursing home placement when medically 

stable.

## 2017-11-09 LAB
ANION GAP SERPL CALC-SCNC: 15 MMOL/L (ref 2–11)
BUN SERPL-MCNC: 83 MG/DL (ref 6–24)
BUN/CREAT SERPL: 13.3 (ref 8–20)
CALCIUM SERPL-MCNC: 9.4 MG/DL (ref 8.6–10.3)
CHLORIDE SERPL-SCNC: 96 MMOL/L (ref 101–111)
GLUCOSE SERPL-MCNC: 80 MG/DL (ref 70–100)
HCO3 SERPL-SCNC: 21 MMOL/L (ref 22–32)
HCT VFR BLD AUTO: 33 % (ref 42–52)
HGB BLD-MCNC: 10.8 G/DL (ref 14–18)
MAGNESIUM SERPL-MCNC: 2.4 MG/DL (ref 1.9–2.7)
MCH RBC QN AUTO: 29 PG (ref 27–31)
MCHC RBC AUTO-ENTMCNC: 32 G/DL (ref 31–36)
MCV RBC AUTO: 89 FL (ref 80–94)
POTASSIUM SERPL-SCNC: 4.9 MMOL/L (ref 3.5–5)
RBC # BLD AUTO: 3.76 10^6/UL (ref 4–5.4)
SODIUM SERPL-SCNC: 132 MMOL/L (ref 133–145)
WBC # BLD AUTO: 11.6 10^3/UL (ref 3.5–10.8)

## 2017-11-09 RX ADMIN — LEVOFLOXACIN SCH MLS/HR: 5 INJECTION, SOLUTION INTRAVENOUS at 21:11

## 2017-11-09 RX ADMIN — BENZONATATE SCH MG: 100 CAPSULE ORAL at 21:10

## 2017-11-09 RX ADMIN — SODIUM CITRATE AND CITRIC ACID MONOHYDRATE SCH ML: 500; 334 SOLUTION ORAL at 21:10

## 2017-11-09 RX ADMIN — ATORVASTATIN CALCIUM SCH MG: 10 TABLET, FILM COATED ORAL at 21:27

## 2017-11-09 RX ADMIN — Medication SCH CAP: at 09:03

## 2017-11-09 RX ADMIN — Medication SCH ML: at 09:42

## 2017-11-09 RX ADMIN — INSULIN LISPRO SCH: 100 INJECTION, SOLUTION INTRAVENOUS; SUBCUTANEOUS at 12:49

## 2017-11-09 RX ADMIN — NYSTATIN SCH APPLIC: 100000 POWDER TOPICAL at 17:15

## 2017-11-09 RX ADMIN — HEPARIN SODIUM SCH UNITS: 5000 INJECTION INTRAVENOUS; SUBCUTANEOUS at 06:03

## 2017-11-09 RX ADMIN — ALBUMIN (HUMAN) SCH: 0.25 INJECTION, SOLUTION INTRAVENOUS at 13:32

## 2017-11-09 RX ADMIN — INSULIN LISPRO SCH: 100 INJECTION, SOLUTION INTRAVENOUS; SUBCUTANEOUS at 17:36

## 2017-11-09 RX ADMIN — RIFAXIMIN SCH MG: 550 TABLET ORAL at 09:03

## 2017-11-09 RX ADMIN — Medication SCH ML: at 17:35

## 2017-11-09 RX ADMIN — INSULIN LISPRO SCH: 100 INJECTION, SOLUTION INTRAVENOUS; SUBCUTANEOUS at 09:41

## 2017-11-09 RX ADMIN — SODIUM CITRATE AND CITRIC ACID MONOHYDRATE SCH ML: 500; 334 SOLUTION ORAL at 09:03

## 2017-11-09 RX ADMIN — RIFAXIMIN SCH MG: 550 TABLET ORAL at 21:10

## 2017-11-09 RX ADMIN — BENZONATATE SCH MG: 100 CAPSULE ORAL at 13:40

## 2017-11-09 RX ADMIN — SODIUM CITRATE AND CITRIC ACID MONOHYDRATE SCH ML: 500; 334 SOLUTION ORAL at 13:40

## 2017-11-09 RX ADMIN — PANTOPRAZOLE SODIUM SCH MG: 40 INJECTION, POWDER, FOR SOLUTION INTRAVENOUS at 12:15

## 2017-11-09 RX ADMIN — NYSTATIN SCH APPLIC: 100000 POWDER TOPICAL at 21:11

## 2017-11-09 RX ADMIN — BENZONATATE SCH MG: 100 CAPSULE ORAL at 09:03

## 2017-11-09 RX ADMIN — HEPARIN SODIUM SCH UNITS: 5000 INJECTION INTRAVENOUS; SUBCUTANEOUS at 21:10

## 2017-11-09 RX ADMIN — BENZONATATE SCH: 100 CAPSULE ORAL at 17:15

## 2017-11-09 RX ADMIN — HEPARIN SODIUM SCH UNITS: 5000 INJECTION INTRAVENOUS; SUBCUTANEOUS at 13:40

## 2017-11-09 NOTE — PN
Subjective


Date of Service: 11/09/17


Interval History: 


.


Saw patient at bedside with RN ICU staff.


Patient in good spirits.


More alert this AM.


Denies pain.


Getting HD without complication


Better mental status


Eating lunch without difficulty.


Will order PT eval; pt states he is ready to cooperate with strengthening 

exercises.


.





Family History: Unchanged from Admission


Social History: Unchanged from Admission


Past Medical History: Unchanged from Admission





Objective


Active Medications: 


.


Acetaminophen (Tylenol Tab*)  650 mg PO Q4H PRN


   PRN Reason: FEVER/PAIN


   Last Admin: 11/04/17 01:22 Dose:  650 mg


Atorvastatin Calcium (Lipitor*)  10 mg PO 1700 [facility]


   Last Admin: 11/08/17 17:30 Dose:  10 mg


Benzonatate (Tessalon Cap*)  100 mg PO QID [facility]


   Last Admin: 11/09/17 13:40 Dose:  100 mg


Citric Acid/Sodium Citrate (Bicitra*)  15 ml PO TID [facility]


   Last Admin: 11/09/17 13:40 Dose:  15 ml


Dextrose (D50w Syringe 50 Ml*)  12.5 gm IV PUSH .FOR FS < 60 - SS PRN


   PRN Reason: FS < 60


   Last Admin: 11/05/17 11:45 Dose:  12.5 gm


Haloperidol Lactate (Haldol Inj Iv/Im*)  2 mg IV SLOW PU Q6H PRN


   PRN Reason: AGITATION


   Last Admin: 11/07/17 03:17 Dose:  2 mg


Heparin Sodium (Porcine) (Heparin Vial(*))  5,000 units SUBCUT Q8HR [facility]


   Last Admin: 11/09/17 13:40 Dose:  5,000 units


Heparin Sodium (Porcine) (Heparin Flush Picc/Ml/Cvc(*))  1 - 3 ml FLUSH 0600,

1800 [facility]


   PRN Reason: Protocol


   Last Admin: 11/09/17 09:42 Dose:  1 ml


Levofloxacin/Dextrose (Levaquin 500 Mg Ivpremix(*))  500 mg in 100 mls @ 100 mls

/hr IVPB Q48H [facility]


   Last Admin: 11/07/17 22:12 Dose:  100 mls/hr


Clindamycin HCl/Dextrose (Cleocin 600 Mg Ivpremix(*) Sdv)  600 mg in 50 mls @ 

100 mls/hr IV 0100,0900,1700 [facility]


   Last Admin: 11/09/17 08:51 Dose:  100 mls/hr


Insulin Human Lispro (Humalog*)  0 units SUBCUT AC [facility]


   PRN Reason: Protocol


   Last Admin: 11/09/17 12:49 Dose:  Not Given


Lactobacillus Rhamnosus (Culturelle*)  1 cap PO DAILY [facility]


   Last Admin: 11/09/17 09:03 Dose:  1 cap


Lactulose (Lactulose*)  30 ml PO TID [facility]


   Last Admin: 11/09/17 13:40 Dose:  30 ml


Nystatin (Nystatin Top Powder*)  1 applic TOPICAL TID [facility]


Ondansetron HCl (Zofran Inj*)  4 mg IV Q4H PRN


   PRN Reason: NAUSEA


   Last Admin: 11/07/17 13:37 Dose:  4 mg


Pantoprazole Sodium (Protonix Iv*)  40 mg IV Q24H [facility]


   Last Admin: 11/09/17 12:15 Dose:  40 mg


Prochlorperazine Edisylate (Compazine Inj*)  5 mg IV Q6H PRN


   PRN Reason: NAUSEA/VOMITING


Rifaximin (Xifaxan*)  550 mg PO BID [facility]


   Last Admin: 11/09/17 09:03 Dose:  550 mg


.


 Vital Signs











  11/08/17 11/08/17 11/08/17





  15:21 15:31 15:45


 


Temperature   


 


Pulse Rate 103 136 94


 


Respiratory  14 10





Rate   


 


Blood Pressure  91/61 123/67





(mmHg)   


 


O2 Sat by Pulse 94 92 95





Oximetry   














  11/08/17 11/08/17 11/08/17





  16:00 16:15 16:30


 


Temperature 97.9 F  


 


Pulse Rate 93 90 105


 


Respiratory 8 13 9





Rate   


 


Blood Pressure 111/73 118/76 106/72





(mmHg)   


 


O2 Sat by Pulse 96 93 93





Oximetry   











Oxygen Devices in Use Now: Nasal Cannula


Appearance: NAd; obese; dishevelled


Eyes: No Scleral Icterus


Ears/Nose/Mouth/Throat: Clear Oropharnyx


Neck: NL Appearance and Movements; NL JVP


Respiratory: Symmetrical Chest Expansion and Respiratory Effort


Cardiovascular: NL Sounds; No Murmurs; No JVD


Abdominal: NL Sounds; No Tenderness; No Distention, - - candida in 

intertriginous areas


Lymphatic: No Cervical Adenopathy


Extremities: - - + lower extremity edema bilaterally


Skin: - - candida


Neurological: Alert and Oriented x 3


Lines/Tubes/Other Access: Clean, Dry and Intact Other Access - HD access; 

tunnelled


Nutrition: Taking PO's


Result Diagrams: 


 11/10/17 06:11





 11/10/17 06:11


Microbiology and Other Data: 


.





Assess/Plan/Problems-Billing





Mr. Blake is a 61 yo male with a PMH significant for morbid obesity, CHF, 

paroxysmal Afib, DMII, HTN, HLD, depression, and CKD who presents after a fall 

with prolonged down time with SEPSIS secondary to both a lower leg cellulitis 

and a UTI, also with mild rhabdomyolysis, acute renal failure, and elevated 

troponins now with acute renal failure requiring HD.


.


Current Medications (as of 9 Nov 17)


- Acetaminophen 650 mg PO Q4H PRN FEVER/PAIN


- Atorvastatin (Lipitor)  10 mg PO daily


- Benzonatate (Tessalon Cap)  100 mg PO QID 


- Citric Acid/Sodium Citrate (Bicitra)  15 ml PO TID


- Dextrose  PRN FS < 60


- Haloperidol 2 mg IV SLOW PU Q6H PRN AGITATION


- Heparin  5,000 units SUBCUT Q8HR 


- Levofloxacin  500 mg in 100 mls @ 100 mls/hr IVPB Q48H 


- Clindamycin 600 mg IV 0100,0900,1700 ALEJANDRA 


- Insulin Human Lispro (Humalog) SUBCUT AC Protocol


- Lactobacillus Rhamnosus (Culturelle)  1 cap PO DAILY


- Lactulose (Lactulose)  30 ml PO TID 


-Nystatin (Nystatin Top Powder)  1 applic TOPICAL TID 


- Ondansetron HCl (Zofran Inj)  4 mg IV Q4H PRN NAUSEA


- Pantoprazole Sodium (Protonix Iv)  40 mg IV Q24H 


- Prochlorperazine Edisylate (Compazine Inj)  5 mg IV Q6H PRN NAUSEA / VOMITING


- Rifaximin (Xifaxan)  550 mg PO BID


.





- Patient Problems


(1) Acute renal failure


Current Visit: Yes   Status: Acute   Priority: High   Comment: 


- Appreciate nephrology input. HD on 11/8 without trouble. 


- IR replaced tunneled HD cath today.


- Continue bicitra for metabolic acidosis.


- HD is quite effective at removing ammonium and related toxins accumulating in 

patients with metabolic encephalopathy.   





(2) Encephalopathy


Current Visit: Yes   Status: Acute   Priority: High   Code(s): G93.40 - 

ENCEPHALOPATHY, UNSPECIFIED   Comment: 


- Better today. 


- Likely combination of uremia, hyperammonemia, prolonged hospitalization/ICU 

stay. 


- Continue lactulose, will add Rifaximin which he has been on in the past. 


- Continue HD as directed by nephrology.   





(3) Acute hypoxemic respiratory failure


Current Visit: Yes   Status: Acute   Priority: High   Code(s): J96.01 - ACUTE 

RESPIRATORY FAILURE WITH HYPOXIA   Comment: 


- Patient suddenly hypoxic on arrival to ICU on 11/6, unclear etiology. 


- Some concern for PE, but dopplers with no evidence of DVT. 


- team opted against CTA; patient doing well now; strongly doubt PE at this 

point, and CTA would be deleterious given tenuous renal failure and hope for 

improvement in renal function.


- Patient noted to vomit into bipap mask 11/6, will be vigilant for signs of 

aspiration pneumonia, Currently on levaquin in addition to Clinda (good 

anaerobic coverage).   





(4) Cellulitis


Current Visit: Yes   Status: Acute   Code(s): L03.90 - CELLULITIS, UNSPECIFIED 

  Comment: 


- Patient has chronic leg wound with chronic venous stasis changes. 


- Consider switching IV clinda to PO soon.


- Wash leg daily with soap and water. 


- Cover with gauze.   





(5) DVT prophylaxis


Current Visit: Yes   Status: Acute   Code(s): OIW6966 -    SNOMED Code(s): 

[id number]


   Comment: 


- HSQ   





(6) Acidosis, metabolic


Current Visit: Yes   Status: Acute   Code(s): E87.2 - ACIDOSIS   SNOMED Code(s)

: [id number]


   Comment: 


- Acidosis steadily worsening after dialysis.  


- Patient given 2 amps bicard, awaiting HD today.   





(7) Depression


Current Visit: Yes   Status: Acute   Code(s): F32.9 - MAJOR DEPRESSIVE DISORDER

, SINGLE EPISODE, UNSPECIFIED   Comment: 


- Patient more interactive than earlier in hospitalization


- Was in generally good spirits 11/9/17.   





(8) Elevated troponin


Current Visit: Yes   Status: Acute   Code(s): R74.8 - ABNORMAL LEVELS OF OTHER 

SERUM ENZYMES   Comment: 


- Trop peaked at 0.40. Suspect secondary to demand ischemia and diminished 

renal function (reduced troponin clearance).


- No CP, SOB, or EKG changes. 


- Consider outpatient stress test when recovered from acute illness.   





(9) HLD (hyperlipidemia)


Current Visit: Yes   Status: Acute   Priority: High   Code(s): E78.5 - 

HYPERLIPIDEMIA, UNSPECIFIED   Comment: 


- Continue simvastatin    





(10) HTN (hypertension)


Current Visit: Yes   Status: Acute   Priority: High   Code(s): I10 - ESSENTIAL (

PRIMARY) HYPERTENSION   Comment: 


- Holding metoprolol.   





(11) Rhabdomyolysis


Current Visit: Yes   Status: Resolved   Code(s): M62.82 - RHABDOMYOLYSIS   

Comment: 


- Resolved.    





(12) UTI (urinary tract infection)


Current Visit: Yes   Status: Resolved   Comment: 


- Asymptomatic.  


- Only 10,000 colonies of proteus.


- On clindamycin for LE wound / levo for poss pna...UTI covered/treated.   





(13) Afib


Current Visit: Yes   Status: Chronic   Priority: Medium   Code(s): I48.91 - 

UNSPECIFIED ATRIAL FIBRILLATION   Comment: 


- Rate controlled, currently off medications


- Eliquis on hold d/t renal function.  


- Patient has documented allergy to warfarin.   





(14) Full code status


Current Visit: Yes   Status: Acute   Priority: High   Code(s): Z78.9 - OTHER 

SPECIFIED HEALTH STATUS   Comment: 


- patient clear about wanting to be full code   


Status and Disposition: 


Inpatient with LOS > 2 days.  Anticipate nursing home placement when medically 

stable.

## 2017-11-10 LAB
ALBUMIN SERPL BCG-MCNC: 3.2 G/DL (ref 3.2–5.2)
ALP SERPL-CCNC: 141 U/L (ref 34–104)
ALT SERPL W P-5'-P-CCNC: 8 U/L (ref 7–52)
ANION GAP SERPL CALC-SCNC: 15 MMOL/L (ref 2–11)
AST SERPL-CCNC: 21 U/L (ref 13–39)
BUN SERPL-MCNC: 88 MG/DL (ref 6–24)
BUN/CREAT SERPL: 13 (ref 8–20)
CALCIUM SERPL-MCNC: 9.1 MG/DL (ref 8.6–10.3)
CHLORIDE SERPL-SCNC: 96 MMOL/L (ref 101–111)
GLOBULIN SER CALC-MCNC: 3.5 G/DL (ref 2–4)
GLUCOSE SERPL-MCNC: 80 MG/DL (ref 70–100)
HCO3 SERPL-SCNC: 20 MMOL/L (ref 22–32)
HCT VFR BLD AUTO: 32 % (ref 42–52)
HGB BLD-MCNC: 10.7 G/DL (ref 14–18)
MAGNESIUM SERPL-MCNC: 2.4 MG/DL (ref 1.9–2.7)
MCH RBC QN AUTO: 29 PG (ref 27–31)
MCHC RBC AUTO-ENTMCNC: 33 G/DL (ref 31–36)
MCV RBC AUTO: 88 FL (ref 80–94)
POTASSIUM SERPL-SCNC: 4.7 MMOL/L (ref 3.5–5)
PROT SERPL-MCNC: 6.7 G/DL (ref 6.4–8.9)
RBC # BLD AUTO: 3.68 10^6/UL (ref 4–5.4)
SODIUM SERPL-SCNC: 131 MMOL/L (ref 133–145)
WBC # BLD AUTO: 10.8 10^3/UL (ref 3.5–10.8)

## 2017-11-10 PROCEDURE — 5A1D70Z PERFORMANCE OF URINARY FILTRATION, INTERMITTENT, LESS THAN 6 HOURS PER DAY: ICD-10-PCS | Performed by: INTERNAL MEDICINE

## 2017-11-10 RX ADMIN — RIFAXIMIN SCH MG: 550 TABLET ORAL at 21:07

## 2017-11-10 RX ADMIN — SODIUM CITRATE AND CITRIC ACID MONOHYDRATE SCH ML: 500; 334 SOLUTION ORAL at 14:28

## 2017-11-10 RX ADMIN — SODIUM CITRATE AND CITRIC ACID MONOHYDRATE SCH ML: 500; 334 SOLUTION ORAL at 12:05

## 2017-11-10 RX ADMIN — NYSTATIN SCH APPLIC: 100000 POWDER TOPICAL at 13:39

## 2017-11-10 RX ADMIN — HEPARIN SODIUM SCH UNITS: 5000 INJECTION INTRAVENOUS; SUBCUTANEOUS at 22:14

## 2017-11-10 RX ADMIN — NYSTATIN SCH: 100000 POWDER TOPICAL at 11:57

## 2017-11-10 RX ADMIN — HEPARIN SODIUM SCH UNITS: 5000 INJECTION INTRAVENOUS; SUBCUTANEOUS at 14:28

## 2017-11-10 RX ADMIN — Medication SCH CAP: at 12:04

## 2017-11-10 RX ADMIN — INSULIN LISPRO SCH: 100 INJECTION, SOLUTION INTRAVENOUS; SUBCUTANEOUS at 17:40

## 2017-11-10 RX ADMIN — BENZONATATE SCH: 100 CAPSULE ORAL at 11:56

## 2017-11-10 RX ADMIN — BENZONATATE SCH MG: 100 CAPSULE ORAL at 14:28

## 2017-11-10 RX ADMIN — Medication SCH: at 19:35

## 2017-11-10 RX ADMIN — BENZONATATE SCH MG: 100 CAPSULE ORAL at 21:06

## 2017-11-10 RX ADMIN — BENZONATATE SCH MG: 100 CAPSULE ORAL at 17:35

## 2017-11-10 RX ADMIN — HEPARIN SODIUM SCH UNITS: 5000 INJECTION INTRAVENOUS; SUBCUTANEOUS at 06:00

## 2017-11-10 RX ADMIN — PANTOPRAZOLE SODIUM SCH MG: 40 INJECTION, POWDER, FOR SOLUTION INTRAVENOUS at 12:10

## 2017-11-10 RX ADMIN — Medication SCH ML: at 06:02

## 2017-11-10 RX ADMIN — INSULIN LISPRO SCH: 100 INJECTION, SOLUTION INTRAVENOUS; SUBCUTANEOUS at 11:51

## 2017-11-10 RX ADMIN — INSULIN LISPRO SCH: 100 INJECTION, SOLUTION INTRAVENOUS; SUBCUTANEOUS at 09:30

## 2017-11-10 RX ADMIN — RIFAXIMIN SCH MG: 550 TABLET ORAL at 12:04

## 2017-11-10 RX ADMIN — ATORVASTATIN CALCIUM SCH MG: 10 TABLET, FILM COATED ORAL at 21:07

## 2017-11-10 RX ADMIN — SODIUM CITRATE AND CITRIC ACID MONOHYDRATE SCH ML: 500; 334 SOLUTION ORAL at 21:06

## 2017-11-10 RX ADMIN — NYSTATIN SCH APPLIC: 100000 POWDER TOPICAL at 21:29

## 2017-11-10 NOTE — PN
Subjective


Date of Service: 11/10/17


Interval History: 


.


rounded on patient with ICU RN staff.


patient having diarrhea...flexiseal applied and barrier cream applied to 

irritated skin.


likely antibiotic assoicated diarrhea.


given improvement in cellulitic areas, will stop antibiotics, as diarrhea poses 

more of a problem than the skin infection at this point.





HD continues with substantial fluid removed.





PT unsuccessful mobilizing patient despite good effort





patient otherwise without new s/sx.


.





Family History: Unchanged from Admission


Social History: Unchanged from Admission


Past Medical History: Unchanged from Admission





Objective


Active Medications: 








Acetaminophen (Tylenol Tab*)  650 mg PO Q4H PRN


   PRN Reason: FEVER/PAIN


   Last Admin: 11/04/17 01:22 Dose:  650 mg


Atorvastatin Calcium (Lipitor*)  10 mg PO BEDTIME [facility]


   Last Admin: 11/09/17 21:27 Dose:  10 mg


Benzonatate (Tessalon Cap*)  100 mg PO QID [facility]


   Last Admin: 11/10/17 17:35 Dose:  100 mg


Citric Acid/Sodium Citrate (Bicitra*)  15 ml PO TID [facility]


   Last Admin: 11/10/17 14:28 Dose:  15 ml


Dextrose (D50w Syringe 50 Ml*)  12.5 gm IV PUSH .FOR FS < 60 - SS PRN


   PRN Reason: FS < 60


   Last Admin: 11/05/17 11:45 Dose:  12.5 gm


Haloperidol Lactate (Haldol Inj Iv/Im*)  2 mg IV SLOW PU Q6H PRN


   PRN Reason: AGITATION


   Last Admin: 11/07/17 03:17 Dose:  2 mg


Heparin Sodium (Porcine) (Heparin Vial(*))  5,000 units SUBCUT Q8HR [facility]


   Last Admin: 11/10/17 14:28 Dose:  5,000 units


Heparin Sodium (Porcine) (Heparin Flush Picc/Ml/Cvc(*))  1 - 3 ml FLUSH 0600,

1800 [facility]DRA


   PRN Reason: Protocol


   Last Admin: 11/10/17 06:02 Dose:  1 ml


Levofloxacin/Dextrose (Levaquin 500 Mg Ivpremix(*))  500 mg in 100 mls @ 100 mls

/hr IVPB Q48H [facility]


   Last Admin: 11/09/17 21:11 Dose:  100 mls/hr


Clindamycin HCl/Dextrose (Cleocin 600 Mg Ivpremix(*) Sdv)  600 mg in 50 mls @ 

100 mls/hr IV 0100,0900,1700 [facility]


   Last Admin: 11/10/17 17:33 Dose:  100 mls/hr


Insulin Human Lispro (Humalog*)  0 units SUBCUT AC [facility]


   PRN Reason: Protocol


   Last Admin: 11/10/17 17:40 Dose:  Not Given


Lactobacillus Rhamnosus (Culturelle*)  1 cap PO DAILY [facility]


   Last Admin: 11/10/17 12:04 Dose:  1 cap


Lactulose (Lactulose*)  30 ml PO TID [facility]


   Last Admin: 11/10/17 14:28 Dose:  30 ml


Nystatin (Nystatin Top Powder*)  1 applic TOPICAL TID [facility]


   Last Admin: 11/10/17 13:39 Dose:  1 applic


Ondansetron HCl (Zofran Inj*)  4 mg IV Q4H PRN


   PRN Reason: NAUSEA


   Last Admin: 11/07/17 13:37 Dose:  4 mg


Pantoprazole Sodium (Protonix Iv*)  40 mg IV Q24H [facility]


   Last Admin: 11/10/17 12:10 Dose:  40 mg


Prochlorperazine Edisylate (Compazine Inj*)  5 mg IV Q6H PRN


   PRN Reason: NAUSEA/VOMITING


Rifaximin (Xifaxan*)  550 mg PO BID [facility]


   Last Admin: 11/10/17 12:04 Dose:  550 mg








 Vital Signs











  11/09/17 11/09/17 11/09/17





  18:30 19:00 19:23


 


Temperature   97.3 F


 


Pulse Rate 90 91 


 


Respiratory 21 23 19





Rate   


 


Blood Pressure 86/60 89/69 





(mmHg)   


 


O2 Sat by Pulse 95 93 





Oximetry   














  11/09/17 11/09/17 11/09/17





  19:31 20:00 20:30


 


Temperature   


 


Pulse Rate 91 91 92


 


Respiratory 27 32 16





Rate   


 


Blood Pressure 91/57 87/53 79/59





(mmHg)   


 


O2 Sat by Pulse 95 93 96





Oximetry   











Oxygen Devices in Use Now: Nasal Cannula


Appearance: obese; dishevelled


Ears/Nose/Mouth/Throat: Clear Oropharnyx, Mucous Membranes Moist


Neck: NL Appearance and Movements; NL JVP


Respiratory: Symmetrical Chest Expansion and Respiratory Effort


Cardiovascular: NL Sounds; No Murmurs; No JVD


Abdominal: NL Sounds; No Tenderness; No Distention


Extremities: - - + edema bilaterally


Skin: - - erythematous, purpuric rash on back and perianal area with stage I 

skin breakdown.


Neurological: Alert and Oriented x 3


Lines/Tubes/Other Access: Clean, Dry and Intact Peripheral IV, Clean, Dry and 

Intact Other Access - HD access - tunelled catheter


Nutrition: Taking PO's


Result Diagrams: 


 11/10/17 06:11





 11/10/17 06:11


Microbiology and Other Data: 


.





Assess/Plan/Problems-Billing





Mr. Blake is a 63 yo male with a PMH significant for morbid obesity, CHF, 

paroxysmal Afib, DMII, HTN, HLD, depression, and CKD who presents after a fall 

with prolonged down time with SEPSIS secondary to both a lower leg cellulitis 

and a UTI, also with mild rhabdomyolysis, acute renal failure, and elevated 

troponins now with acute renal failure requiring HD.


.


Current Medications (as of 9 Nov 17)


- Acetaminophen 650 mg PO Q4H PRN FEVER/PAIN


- Atorvastatin (Lipitor)  10 mg PO daily


- Benzonatate (Tessalon Cap)  100 mg PO QID 


- Citric Acid/Sodium Citrate (Bicitra)  15 ml PO TID


- Dextrose  PRN FS < 60


- Haloperidol 2 mg IV SLOW PU Q6H PRN AGITATION


- Heparin  5,000 units SUBCUT Q8HR 


- Levofloxacin  500 mg in 100 mls @ 100 mls/hr IVPB Q48H 


- Clindamycin 600 mg IV 0100,0900,1700 [facility] 


- Insulin Human Lispro (Humalog) SUBCUT AC Protocol


- Lactobacillus Rhamnosus (Culturelle)  1 cap PO DAILY


- Lactulose (Lactulose)  30 ml PO TID 


- Nystatin (Nystatin Top Powder)  1 applic TOPICAL TID 


- Ondansetron HCl (Zofran Inj)  4 mg IV Q4H PRN NAUSEA


- Pantoprazole Sodium (Protonix Iv)  40 mg IV Q24H 


- Prochlorperazine Edisylate (Compazine Inj)  5 mg IV Q6H PRN NAUSEA / VOMITING


- Rifaximin (Xifaxan)  550 mg PO BID


.





- Patient Problems


(1) Acute renal failure


Current Visit: Yes   Status: Acute   Priority: High   Comment: 


- Appreciate nephrology input. HD on 11/8 & 11/9 without trouble. 


- IR replaced tunneled HD cath.


- Continue bicitra for metabolic acidosis.


- HD is quite effective at removing ammonium and related toxins accumulating in 

patients with metabolic encephalopathy.   





(2) Encephalopathy


Current Visit: Yes   Status: Acute   Priority: High   Code(s): G93.40 - 

ENCEPHALOPATHY, UNSPECIFIED   Comment: 


- remains lucid.


- Altered mental status a combination of uremia, hyperammonemia, prolonged 

hospitalization/ICU stay (uremic and metabolic encephalopathy)


- Continue lactulose, will add Rifaximin, which he has been on in the past. 


- Continue HD as directed by nephrology.   





(3) Acute hypoxemic respiratory failure


Current Visit: Yes   Status: Acute   Priority: High   Code(s): J96.01 - ACUTE 

RESPIRATORY FAILURE WITH HYPOXIA   Comment: 


- Patient suddenly hypoxic on arrival to ICU on 11/6, unclear etiology. 


- Some concern for PE, but dopplers with no evidence of DVT. 


- team opted against CTA; patient doing well now; strongly doubt PE at this 

point, and CTA would be deleterious given tenuous renal failure and hope for 

improvement in renal function.


- Patient noted to vomit into bipap mask 11/6, will be vigilant for signs of 

aspiration pneumonia, was on levaquin in addition to Clinda (good anaerobic 

coverage) - heather lstop for abx-associated diarrhea   





(4) Cellulitis


Current Visit: Yes   Status: Acute   Code(s): L03.90 - CELLULITIS, UNSPECIFIED 

  Comment: 


- Patient has chronic leg wound with chronic venous stasis changes. 


- sto pabx


- Wash leg daily with soap and water. 


- Cover with gauze.   





(5) DVT prophylaxis


Current Visit: Yes   Status: Acute   Code(s): EYQ4374 -    SNOMED Code(s): 

[id number]


   Comment: 


- HSQ   





(6) Acidosis, metabolic


Current Visit: Yes   Status: Acute   Code(s): E87.2 - ACIDOSIS   SNOMED Code(s)

: [id number]


   Comment: 


- Acidosis steadily worsening after dialysis.  


- Patient given 2 amps bicard, awaiting HD today.   





(7) Depression


Current Visit: Yes   Status: Acute   Code(s): F32.9 - MAJOR DEPRESSIVE DISORDER

, SINGLE EPISODE, UNSPECIFIED   Comment: 


- Patient more interactive than earlier in hospitalization


- Was in generally good spirits 11/9/17.   





(8) Elevated troponin


Current Visit: Yes   Status: Acute   Code(s): R74.8 - ABNORMAL LEVELS OF OTHER 

SERUM ENZYMES   Comment: 


- Trop peaked at 0.40. Suspect secondary to demand ischemia and diminished 

renal function (reduced troponin clearance).


- No CP, SOB, or EKG changes. 


- Consider outpatient stress test when recovered from acute illness.   





(9) HLD (hyperlipidemia)


Current Visit: Yes   Status: Acute   Priority: High   Code(s): E78.5 - 

HYPERLIPIDEMIA, UNSPECIFIED   Comment: 


- Continue simvastatin    





(10) HTN (hypertension)


Current Visit: Yes   Status: Acute   Priority: High   Code(s): I10 - ESSENTIAL (

PRIMARY) HYPERTENSION   Comment: 


- Holding metoprolol.   





(11) Rhabdomyolysis


Current Visit: Yes   Status: Resolved   Code(s): M62.82 - RHABDOMYOLYSIS   

Comment: 


- Resolved.    





(12) UTI (urinary tract infection)


Current Visit: Yes   Status: Resolved   Comment: 


- Asymptomatic.  


- Only 10,000 colonies of proteus.


- On clindamycin for LE wound / levo for poss pna...UTI covered/treated.   





(13) Afib


Current Visit: Yes   Status: Chronic   Priority: Medium   Code(s): I48.91 - 

UNSPECIFIED ATRIAL FIBRILLATION   Comment: 


- Rate controlled, currently off medications


- Eliquis on hold d/t renal function.  


- Patient has documented allergy to warfarin.   





(14) Full code status


Current Visit: Yes   Status: Acute   Priority: High   Code(s): Z78.9 - OTHER 

SPECIFIED HEALTH STATUS   Comment: 


- patient clear about wanting to be full code   


Status and Disposition: 


Inpatient with LOS > 2 days.  Anticipate nursing home placement when medically 

stable.

## 2017-11-11 LAB
ALBUMIN SERPL BCG-MCNC: 3.2 G/DL (ref 3.2–5.2)
ALP SERPL-CCNC: 124 U/L (ref 34–104)
ALT SERPL W P-5'-P-CCNC: 9 U/L (ref 7–52)
ANION GAP SERPL CALC-SCNC: 15 MMOL/L (ref 2–11)
AST SERPL-CCNC: 21 U/L (ref 13–39)
BUN SERPL-MCNC: 76 MG/DL (ref 6–24)
BUN/CREAT SERPL: 12.1 (ref 8–20)
CALCIUM SERPL-MCNC: 9.1 MG/DL (ref 8.6–10.3)
CHLORIDE SERPL-SCNC: 96 MMOL/L (ref 101–111)
GLOBULIN SER CALC-MCNC: 3.7 G/DL (ref 2–4)
GLUCOSE SERPL-MCNC: 74 MG/DL (ref 70–100)
HCO3 SERPL-SCNC: 22 MMOL/L (ref 22–32)
HCT VFR BLD AUTO: 33 % (ref 42–52)
HGB BLD-MCNC: 10.7 G/DL (ref 14–18)
MAGNESIUM SERPL-MCNC: 2.4 MG/DL (ref 1.9–2.7)
MCH RBC QN AUTO: 29 PG (ref 27–31)
MCHC RBC AUTO-ENTMCNC: 32 G/DL (ref 31–36)
MCV RBC AUTO: 89 FL (ref 80–94)
POTASSIUM SERPL-SCNC: 4.1 MMOL/L (ref 3.5–5)
PROT SERPL-MCNC: 6.9 G/DL (ref 6.4–8.9)
RBC # BLD AUTO: 3.74 10^6/UL (ref 4–5.4)
SODIUM SERPL-SCNC: 133 MMOL/L (ref 133–145)
WBC # BLD AUTO: 9.3 10^3/UL (ref 3.5–10.8)

## 2017-11-11 RX ADMIN — BENZONATATE SCH MG: 100 CAPSULE ORAL at 21:32

## 2017-11-11 RX ADMIN — BENZONATATE SCH MG: 100 CAPSULE ORAL at 10:04

## 2017-11-11 RX ADMIN — BENZONATATE SCH MG: 100 CAPSULE ORAL at 16:58

## 2017-11-11 RX ADMIN — ATORVASTATIN CALCIUM SCH: 10 TABLET, FILM COATED ORAL at 12:52

## 2017-11-11 RX ADMIN — INSULIN LISPRO SCH UNITS: 100 INJECTION, SOLUTION INTRAVENOUS; SUBCUTANEOUS at 11:46

## 2017-11-11 RX ADMIN — INSULIN LISPRO SCH: 100 INJECTION, SOLUTION INTRAVENOUS; SUBCUTANEOUS at 08:47

## 2017-11-11 RX ADMIN — NYSTATIN SCH: 100000 POWDER TOPICAL at 10:18

## 2017-11-11 RX ADMIN — Medication SCH ML: at 16:58

## 2017-11-11 RX ADMIN — NYSTATIN SCH APPLIC: 100000 POWDER TOPICAL at 21:39

## 2017-11-11 RX ADMIN — HEPARIN SODIUM SCH UNITS: 5000 INJECTION INTRAVENOUS; SUBCUTANEOUS at 21:32

## 2017-11-11 RX ADMIN — SODIUM CITRATE AND CITRIC ACID MONOHYDRATE SCH ML: 500; 334 SOLUTION ORAL at 21:32

## 2017-11-11 RX ADMIN — RIFAXIMIN SCH MG: 550 TABLET ORAL at 10:04

## 2017-11-11 RX ADMIN — INSULIN LISPRO SCH: 100 INJECTION, SOLUTION INTRAVENOUS; SUBCUTANEOUS at 17:12

## 2017-11-11 RX ADMIN — Medication SCH CAP: at 10:03

## 2017-11-11 RX ADMIN — HEPARIN SODIUM SCH UNITS: 5000 INJECTION INTRAVENOUS; SUBCUTANEOUS at 05:22

## 2017-11-11 RX ADMIN — PANTOPRAZOLE SODIUM SCH MG: 40 INJECTION, POWDER, FOR SOLUTION INTRAVENOUS at 11:47

## 2017-11-11 RX ADMIN — RIFAXIMIN SCH MG: 550 TABLET ORAL at 21:32

## 2017-11-11 RX ADMIN — Medication SCH: at 05:29

## 2017-11-11 RX ADMIN — BENZONATATE SCH MG: 100 CAPSULE ORAL at 14:00

## 2017-11-11 RX ADMIN — SODIUM CITRATE AND CITRIC ACID MONOHYDRATE SCH ML: 500; 334 SOLUTION ORAL at 10:03

## 2017-11-11 RX ADMIN — HEPARIN SODIUM SCH UNITS: 5000 INJECTION INTRAVENOUS; SUBCUTANEOUS at 14:00

## 2017-11-11 RX ADMIN — SODIUM CITRATE AND CITRIC ACID MONOHYDRATE SCH ML: 500; 334 SOLUTION ORAL at 14:00

## 2017-11-11 RX ADMIN — NYSTATIN SCH APPLIC: 100000 POWDER TOPICAL at 14:36

## 2017-11-11 RX ADMIN — ATORVASTATIN CALCIUM SCH MG: 10 TABLET, FILM COATED ORAL at 21:31

## 2017-11-11 NOTE — PN
Subjective


Date of Service: 11/11/17


Interval History: 


.


pt in good spirits


wants to go to floor (he is ready)


working with PT


skin care underway, but challenging.


denies pain


eating well.





Family History: Unchanged from Admission


Social History: Unchanged from Admission


Past Medical History: Unchanged from Admission





Objective


Active Medications: 








Acetaminophen (Tylenol Tab*)  650 mg PO Q4H PRN


   PRN Reason: FEVER/PAIN


   Last Admin: 11/04/17 01:22 Dose:  650 mg


Al Hydrox/Mg Hydrox/Simethicone (Maalox Plus*)  30 ml PO Q6H PRN


   PRN Reason: heartburn / indigestion


   Last Admin: 11/11/17 14:35 Dose:  30 ml


Atorvastatin Calcium (Lipitor*)  10 mg PO BEDTIME [facility]


   Last Admin: 11/10/17 21:07 Dose:  10 mg


Benzonatate (Tessalon Cap*)  100 mg PO QID [facility]


   Last Admin: 11/11/17 16:58 Dose:  100 mg


Citric Acid/Sodium Citrate (Bicitra*)  15 ml PO TID [facility]


   Last Admin: 11/11/17 14:00 Dose:  15 ml


Dextrose (D50w Syringe 50 Ml*)  12.5 gm IV PUSH .FOR FS < 60 - SS PRN


   PRN Reason: FS < 60


   Last Admin: 11/05/17 11:45 Dose:  12.5 gm


Haloperidol Lactate (Haldol Inj Iv/Im*)  2 mg IV SLOW PU Q6H PRN


   PRN Reason: AGITATION


   Last Admin: 11/07/17 03:17 Dose:  2 mg


Heparin Sodium (Porcine) (Heparin Vial(*))  5,000 units SUBCUT Q8HR [facility]


   Last Admin: 11/11/17 14:00 Dose:  5,000 units


Heparin Sodium (Porcine) (Heparin Flush Picc/Ml/Cvc(*))  1 - 3 ml FLUSH 0600,

1800 ALEJANDRA


   PRN Reason: Protocol


   Last Admin: 11/11/17 16:58 Dose:  1 ml


Insulin Human Lispro (Humalog*)  0 units SUBCUT AC [facility]


   PRN Reason: Protocol


   Last Admin: 11/11/17 17:12 Dose:  Not Given


Lactobacillus Rhamnosus (Culturelle*)  1 cap PO DAILY [facility]


   Last Admin: 11/11/17 10:03 Dose:  1 cap


Lactulose (Lactulose*)  30 ml PO TID [facility]


   Last Admin: 11/11/17 14:00 Dose:  30 ml


Nystatin (Nystatin Top Powder*)  1 applic TOPICAL TID [facility]


   Last Admin: 11/11/17 14:36 Dose:  1 applic


Ondansetron HCl (Zofran Inj*)  4 mg IV Q4H PRN


   PRN Reason: NAUSEA


   Last Admin: 11/07/17 13:37 Dose:  4 mg


Pantoprazole Sodium (Protonix Iv*)  40 mg IV Q24H [facility]


   Last Admin: 11/11/17 11:47 Dose:  40 mg


Prochlorperazine Edisylate (Compazine Inj*)  5 mg IV Q6H PRN


   PRN Reason: NAUSEA/VOMITING


Rifaximin (Xifaxan*)  550 mg PO BID [facility]


   Last Admin: 11/11/17 10:04 Dose:  550 mg








 Vital Signs











  11/10/17 11/10/17 11/10/17





  18:30 19:00 19:31


 


Temperature   


 


Pulse Rate   


 


Respiratory 16 14 16





Rate   


 


Blood Pressure 90/55 96/65 





(mmHg)   


 


O2 Sat by Pulse   





Oximetry   














  11/10/17 11/10/17 11/10/17





  20:00 20:01 20:30


 


Temperature 97.0 F  


 


Pulse Rate   97


 


Respiratory 17 16 15





Rate   


 


Blood Pressure  77/60 62/30





(mmHg)   


 


O2 Sat by Pulse   98





Oximetry   








Oxygen Devices in Use Now: Nasal Cannula


Appearance: obese, dishevelled


Ears/Nose/Mouth/Throat: Clear Oropharnyx


Neck: Trachea Midline


Respiratory: Symmetrical Chest Expansion and Respiratory Effort


Cardiovascular: NL Sounds; No Murmurs; No JVD


Abdominal: NL Sounds; No Tenderness; No Distention


Lymphatic: No Cervical Adenopathy


Skin: - - extensive skin rash and irritation around buttox...flexiseal in place


Neurological: Alert and Oriented x 3


Lines/Tubes/Other Access: Clean, Dry and Intact Peripheral IV, Clean, Dry and 

Intact Other Access - HD tunelled cath


Nutrition: Taking PO's


Result Diagrams: 


 11/11/17 05:25





 11/11/17 05:25


Microbiology and Other Data: 


.





Assess/Plan/Problems-Billing





Mr. Blake is a 61 yo male with a PMH significant for morbid obesity, CHF, 

paroxysmal Afib, DMII, HTN, HLD, depression, and CKD who presents after a fall 

with prolonged down time with SEPSIS secondary to both a lower leg cellulitis 

and a UTI, also with mild rhabdomyolysis, acute renal failure, and elevated 

troponins now with acute renal failure requiring HD.


.


Current Medications (as of 9 Nov 17)


- Acetaminophen 650 mg PO Q4H PRN FEVER/PAIN


- Atorvastatin (Lipitor)  10 mg PO daily


- Benzonatate (Tessalon Cap)  100 mg PO QID 


- Citric Acid/Sodium Citrate (Bicitra)  15 ml PO TID


- Dextrose  PRN FS < 60


- Haloperidol 2 mg IV SLOW PU Q6H PRN AGITATION


- Heparin  5,000 units SUBCUT Q8HR 


- Levofloxacin  500 mg in 100 mls @ 100 mls/hr IVPB Q48H 


- Clindamycin 600 mg IV 0100,0900,1700 [facility]DRA 


- Insulin Human Lispro (Humalog) SUBCUT AC Protocol


- Lactobacillus Rhamnosus (Culturelle)  1 cap PO DAILY


- Lactulose (Lactulose)  30 ml PO TID 


- Nystatin (Nystatin Top Powder)  1 applic TOPICAL TID 


- Ondansetron HCl (Zofran Inj)  4 mg IV Q4H PRN NAUSEA


- Pantoprazole Sodium (Protonix Iv)  40 mg IV Q24H 


- Prochlorperazine Edisylate (Compazine Inj)  5 mg IV Q6H PRN NAUSEA / VOMITING


- Rifaximin (Xifaxan)  550 mg PO BID


.





- Patient Problems


(1) Acute renal failure


Current Visit: Yes   Status: Acute   Priority: High   Comment: 


- Appreciate nephrology input. HD ongoing without trouble. 


- IR replaced tunneled HD cath.


- Continue bicitra for metabolic acidosis.


- HD is quite effective at removing ammonium and related toxins accumulating in 

patients with metabolic encephalopathy.   





(2) Encephalopathy


Current Visit: Yes   Status: Acute   Priority: High   Code(s): G93.40 - 

ENCEPHALOPATHY, UNSPECIFIED   Comment: 


- remains lucid.


- Altered mental status was a combination of uremia, hyperammonemia, prolonged 

hospitalization/ICU stay (combined uremic and metabolic encephalopathy)


- Continue lactulose, will add Rifaximin, which he has been on in the past. 


- Continue HD as directed by nephrology.   





(3) Acute hypoxemic respiratory failure


Current Visit: Yes   Status: Acute   Priority: High   Code(s): J96.01 - ACUTE 

RESPIRATORY FAILURE WITH HYPOXIA   Comment: 


- Patient suddenly hypoxic on arrival to ICU on 11/6, unclear etiology. 


- Some concern for PE, but dopplers with no evidence of DVT. 


- team opted against CTA; patient doing well now; strongly doubt PE at this 

point, and CTA would be deleterious given tenuous renal failure and hope for 

improvement in renal function.


- Patient noted to vomit into bipap mask 11/6, will be vigilant for signs of 

aspiration pneumonia, was on levaquin in addition to Clinda (good anaerobic 

coverage) - stopped for abx-associated diarrhea   





(4) Cellulitis


Current Visit: Yes   Status: Acute   Code(s): L03.90 - CELLULITIS, UNSPECIFIED 

  Comment: 


- Patient has chronic leg wound with chronic venous stasis changes. 


- stop abx


- Wash leg daily with soap and water. 


- Cover with gauze.   





(5) DVT prophylaxis


Current Visit: Yes   Status: Acute   Priority: High   Code(s): YNZ0483 -    

Comment: 


- HSQ   





(6) Acidosis, metabolic


Current Visit: Yes   Status: Acute   Priority: High   Code(s): E87.2 - ACIDOSIS

   Comment: 


- Acidosis resolving   





(7) Depression


Current Visit: Yes   Status: Acute   Code(s): F32.9 - MAJOR DEPRESSIVE DISORDER

, SINGLE EPISODE, UNSPECIFIED   Comment: 


- Patient more interactive than earlier in hospitalization


- Was in generally good spirits 11/9/17.   





(8) Elevated troponin


Current Visit: Yes   Status: Acute   Code(s): R74.8 - ABNORMAL LEVELS OF OTHER 

SERUM ENZYMES   Comment: 


- Trop peaked at 0.40. Suspect secondary to demand ischemia and diminished 

renal function (reduced troponin clearance).


- No CP, SOB, or EKG changes. 


- Consider outpatient stress test when recovered from acute illness.   





(9) HLD (hyperlipidemia)


Current Visit: Yes   Status: Chronic   Priority: High   Code(s): E78.5 - 

HYPERLIPIDEMIA, UNSPECIFIED   Comment: 


- Continue simvastatin    





(10) HTN (hypertension)


Current Visit: Yes   Status: Acute   Priority: High   Code(s): I10 - ESSENTIAL (

PRIMARY) HYPERTENSION   Comment: 


- Holding metoprolol.   





(11) Rhabdomyolysis


Current Visit: Yes   Status: Resolved   Code(s): M62.82 - RHABDOMYOLYSIS   

Comment: 


- Resolved.    





(12) UTI (urinary tract infection)


Current Visit: Yes   Status: Resolved   Comment: 


- Asymptomatic.  


- Only 10,000 colonies of proteus.


- On clindamycin for LE wound / levo for poss pna...UTI covered/treated.   





(13) Afib


Current Visit: Yes   Status: Chronic   Priority: Medium   Code(s): I48.91 - 

UNSPECIFIED ATRIAL FIBRILLATION   Comment: 


- Rate controlled, currently off medications


- Eliquis on hold d/t renal function.  


- Patient has documented allergy to warfarin.   





(14) Full code status


Current Visit: Yes   Status: Acute   Priority: High   Code(s): Z78.9 - OTHER 

SPECIFIED HEALTH STATUS   Comment: 


- patient clear about wanting to be full code   


Status and Disposition: 


Inpatient with LOS > 2 days.  Anticipate nursing home placement when medically 

stable.

## 2017-11-12 RX ADMIN — SODIUM CITRATE AND CITRIC ACID MONOHYDRATE SCH ML: 500; 334 SOLUTION ORAL at 22:10

## 2017-11-12 RX ADMIN — BENZONATATE SCH MG: 100 CAPSULE ORAL at 22:10

## 2017-11-12 RX ADMIN — INSULIN LISPRO SCH: 100 INJECTION, SOLUTION INTRAVENOUS; SUBCUTANEOUS at 18:14

## 2017-11-12 RX ADMIN — HEPARIN SODIUM SCH UNITS: 5000 INJECTION INTRAVENOUS; SUBCUTANEOUS at 22:12

## 2017-11-12 RX ADMIN — BENZONATATE SCH MG: 100 CAPSULE ORAL at 10:08

## 2017-11-12 RX ADMIN — Medication SCH ML: at 16:50

## 2017-11-12 RX ADMIN — RIFAXIMIN SCH MG: 550 TABLET ORAL at 10:08

## 2017-11-12 RX ADMIN — SODIUM CITRATE AND CITRIC ACID MONOHYDRATE SCH ML: 500; 334 SOLUTION ORAL at 10:08

## 2017-11-12 RX ADMIN — NYSTATIN SCH APPLIC: 100000 POWDER TOPICAL at 16:14

## 2017-11-12 RX ADMIN — Medication SCH ML: at 06:23

## 2017-11-12 RX ADMIN — PANTOPRAZOLE SODIUM SCH MG: 40 INJECTION, POWDER, FOR SOLUTION INTRAVENOUS at 14:30

## 2017-11-12 RX ADMIN — BENZONATATE SCH: 100 CAPSULE ORAL at 16:50

## 2017-11-12 RX ADMIN — RIFAXIMIN SCH MG: 550 TABLET ORAL at 22:10

## 2017-11-12 RX ADMIN — HEPARIN SODIUM SCH UNITS: 5000 INJECTION INTRAVENOUS; SUBCUTANEOUS at 14:31

## 2017-11-12 RX ADMIN — NYSTATIN SCH APPLIC: 100000 POWDER TOPICAL at 22:11

## 2017-11-12 RX ADMIN — NYSTATIN SCH APPLIC: 100000 POWDER TOPICAL at 10:08

## 2017-11-12 RX ADMIN — BENZONATATE SCH MG: 100 CAPSULE ORAL at 14:30

## 2017-11-12 RX ADMIN — INSULIN LISPRO SCH: 100 INJECTION, SOLUTION INTRAVENOUS; SUBCUTANEOUS at 07:46

## 2017-11-12 RX ADMIN — INSULIN LISPRO SCH UNITS: 100 INJECTION, SOLUTION INTRAVENOUS; SUBCUTANEOUS at 14:30

## 2017-11-12 RX ADMIN — Medication SCH CAP: at 10:08

## 2017-11-12 RX ADMIN — HEPARIN SODIUM SCH UNITS: 5000 INJECTION INTRAVENOUS; SUBCUTANEOUS at 06:23

## 2017-11-12 RX ADMIN — ATORVASTATIN CALCIUM SCH MG: 10 TABLET, FILM COATED ORAL at 22:11

## 2017-11-12 RX ADMIN — SODIUM CITRATE AND CITRIC ACID MONOHYDRATE SCH ML: 500; 334 SOLUTION ORAL at 14:30

## 2017-11-12 NOTE — PN
PROGRESS NOTE:

 

DATE OF SERVICE:

 

HISTORY:  Mr. Blake seems improved.  His mental status seems clearer.  He is complaining of some diffus
e itching.  He is having no respiratory difficulties.  No nausea or vomiting.  His blood pressure has
 been a bit low, presently 100/71, heart rate of 101, respirations of 21.  He is still markedly edema
tous, although the level of edema is considerably reduced.  His urine output has been very, very smal
l.  His weight today, on measuring, it seems totally wrong, but they have switched beds so that may a
ccount for the change.

 

IMPRESSION:  Acute on chronic renal insufficiency.  I am very disappointed by the levels of his urine
 output with regard to the possibility of recovery of function. We are going to be proceeding now wit
h hemodialysis for the next few days before trying to reassess.

 

 [id number]/CPS #: [id number]

## 2017-11-12 NOTE — PN
Subjective


Date of Service: 11/12/17


Interval History: 


.


- Rounded on patient with RN - pt still very lethargic and weakened. 


- PT/OT ordered


- Wound consult requested for tomorrow (monday) - his illness and current 

situation -- despite vigilant turning and positioning -- make skin care VERY 

challenging.


- Will stop lactulose 2/2 loose stools and irritated skin. (Abx already off).


- Eating OK. Actually quite pleasant and interactive when talking.


- Confirmed he was independent (using wheelchair) in community before this 

admission.


.


Family History: Unchanged from Admission


Social History: Unchanged from Admission


Past Medical History: Unchanged from Admission





Objective


Active Medications: 


.


Acetaminophen (Tylenol Tab*)  650 mg PO Q4H PRN


   PRN Reason: FEVER/PAIN


   Last Admin: 11/04/17 01:22 Dose:  650 mg


Al Hydrox/Mg Hydrox/Simethicone (Maalox Plus*)  30 ml PO Q6H PRN


   PRN Reason: heartburn / indigestion


   Last Admin: 11/11/17 14:35 Dose:  30 ml


Atorvastatin Calcium (Lipitor*)  10 mg PO BEDTIME [facility]


   Last Admin: 11/11/17 21:31 Dose:  10 mg


Benzonatate (Tessalon Cap*)  100 mg PO QID [facility]


   Last Admin: 11/12/17 16:50 Dose:  Not Given


Citric Acid/Sodium Citrate (Bicitra*)  15 ml PO TID [facility]


   Last Admin: 11/12/17 14:30 Dose:  15 ml


Dextrose (D50w Syringe 50 Ml*)  12.5 gm IV PUSH .FOR FS < 60 - SS PRN


   PRN Reason: FS < 60


   Last Admin: 11/05/17 11:45 Dose:  12.5 gm


Haloperidol Lactate (Haldol Inj Iv/Im*)  2 mg IV SLOW PU Q6H PRN


   PRN Reason: AGITATION


   Last Admin: 11/07/17 03:17 Dose:  2 mg


Heparin Sodium (Porcine) (Heparin Vial(*))  5,000 units SUBCUT Q8HR [facility]


   Last Admin: 11/12/17 14:31 Dose:  5,000 units


Heparin Sodium (Porcine) (Heparin Flush Picc/Ml/Cvc(*))  1 - 3 ml FLUSH 0600,

1800 [facility]


   PRN Reason: Protocol


   Last Admin: 11/12/17 16:50 Dose:  1 ml


Insulin Human Lispro (Humalog*)  0 units SUBCUT AC [facility]


   PRN Reason: Protocol


   Last Admin: 11/12/17 18:14 Dose:  Not Given


Lactobacillus Rhamnosus (Culturelle*)  1 cap PO DAILY [facility]


   Last Admin: 11/12/17 10:08 Dose:  1 cap


Nystatin (Nystatin Top Powder*)  1 applic TOPICAL TID [facility]


   Last Admin: 11/12/17 16:14 Dose:  1 applic


Ondansetron HCl (Zofran Inj*)  4 mg IV Q4H PRN


   PRN Reason: NAUSEA


   Last Admin: 11/07/17 13:37 Dose:  4 mg


Pantoprazole Sodium (Protonix Iv*)  40 mg IV Q24H [facility]


   Last Admin: 11/12/17 14:30 Dose:  40 mg


Prochlorperazine Edisylate (Compazine Inj*)  5 mg IV Q6H PRN


   PRN Reason: NAUSEA/VOMITING


Rifaximin (Xifaxan*)  550 mg PO BID [facility]


   Last Admin: 11/12/17 10:08 Dose:  550 mg


.


 Vital Signs











  11/11/17 11/11/17 11/11/17





  19:50 20:00 20:47


 


Temperature 97.5 F  


 


Pulse Rate 106  


 


Respiratory 18 18 





Rate   


 


Blood Pressure 89/54  90/40





(mmHg)   


 


O2 Sat by Pulse 91  





Oximetry   














  11/11/17 11/11/17 11/12/17





  23:19 23:48 00:00


 


Temperature 96.8 F  


 


Pulse Rate 107  


 


Respiratory 16  





Rate   


 


Blood Pressure 84/42 70/1 





(mmHg)   


 


O2 Sat by Pulse 90  91





Oximetry   











Oxygen Devices in Use Now: Nasal Cannula


Appearance: obese, dishevelled. sleeps a lot.


Eyes: No Scleral Icterus


Ears/Nose/Mouth/Throat: Clear Oropharnyx


Neck: Trachea Midline


Respiratory: Symmetrical Chest Expansion and Respiratory Effort


Cardiovascular: NL Sounds; No Murmurs; No JVD


Abdominal: NL Sounds; No Tenderness; No Distention


Lymphatic: No Cervical Adenopathy


Extremities: - - considerable b/l edema - 


Skin: No Nodules or Sclerosis, - - back and groin irritation. stage II ulcers 

on back (worsening). patient is on air mattress and being turned. so heavy - 

and immobile and chronically ill.


Neurological: Alert and Oriented x 3


Lines/Tubes/Other Access: Clean, Dry and Intact Peripheral IV, Clean, Dry and 

Intact Other Access - HD access


Nutrition: Taking PO's


Result Diagrams: 


 11/11/17 05:25





 11/11/17 05:25


Microbiology and Other Data: 


.





Assess/Plan/Problems-Billing





Mr. Blake is a 61 yo male with a PMH significant for morbid obesity, CHF, 

paroxysmal Afib, DMII, HTN, HLD, depression, and CKD who presents after a fall 

with prolonged down time with SEPSIS secondary to both a lower leg cellulitis 

and a UTI, also with mild rhabdomyolysis, acute renal failure, and elevated 

troponins now with acute renal failure requiring ongoing HD.


.


Current Medications (as of 9 Nov 17)


- Acetaminophen 650 mg PO Q4H PRN FEVER/PAIN


- Atorvastatin (Lipitor)  10 mg PO daily


- Benzonatate (Tessalon Cap)  100 mg PO QID 


- Citric Acid/Sodium Citrate (Bicitra)  15 ml PO TID


- Dextrose  PRN FS < 60


- Haloperidol 2 mg IV SLOW PU Q6H PRN AGITATION


- Heparin  5,000 units SUBCUT Q8HR 


- Levofloxacin  500 mg in 100 mls @ 100 mls/hr IVPB Q48H 


- Clindamycin 600 mg IV 0100,0900,1700 ALEJANDRA 


- Insulin Human Lispro (Humalog) SUBCUT AC Protocol


- Lactobacillus Rhamnosus (Culturelle)  1 cap PO DAILY


- Lactulose (Lactulose)  30 ml PO TID -- STOPPED 11/12/17


- Nystatin (Nystatin Top Powder)  1 applic TOPICAL TID 


- Ondansetron HCl (Zofran Inj)  4 mg IV Q4H PRN NAUSEA


- Pantoprazole Sodium (Protonix Iv)  40 mg IV Q24H 


- Prochlorperazine Edisylate (Compazine Inj)  5 mg IV Q6H PRN NAUSEA / VOMITING


- Rifaximin (Xifaxan)  550 mg PO BID


.





- Patient Problems


(1) Acute renal failure


Current Visit: Yes   Status: Acute   Priority: High   Comment: 


- Appreciate nephrology input. HD ongoing without trouble. 


- IR replaced tunneled HD cath. / Continue bicitra for metabolic acidosis.


- HD is quite effective at removing ammonium and related toxins accumulating in 

patients with metabolic encephalopathy.   





(2) Encephalopathy


Current Visit: Yes   Status: Acute   Priority: High   Code(s): G93.40 - 

ENCEPHALOPATHY, UNSPECIFIED   Comment: 


- Remains lucid.


- Altered mental status was a combination of uremia, hyperammonemia, prolonged 

hospitalization/ICU stay (combined uremic and metabolic encephalopathy)


- STOPPED lactulose for diarrhea and skin considerations, will continue 

Rifaximin, which he has been on in the past. 


- Continue HD, as directed by nephrology.   





(3) Acute hypoxemic respiratory failure


Current Visit: Yes   Status: Acute   Priority: High   Code(s): J96.01 - ACUTE 

RESPIRATORY FAILURE WITH HYPOXIA   Comment: 


- Patient suddenly hypoxic on arrival to ICU on 11/6, unclear etiology. 


- Some concern for PE, but dopplers with no evidence of DVT. 


- team opted against CTA; patient doing well now; strongly doubt PE at this 

point, and CTA would be deleterious given tenuous renal failure and hope for 

improvement in renal function.


- Patient noted to vomit into bipap mask 11/6, will be vigilant for signs of 

aspiration pneumonia, WAS on levaquin in addition to Clinda (good anaerobic 

coverage) - but stopped both for abx-associated diarrhea   





(4) Cellulitis


Current Visit: Yes   Status: Acute   Code(s): L03.90 - CELLULITIS, UNSPECIFIED 

  Comment: 


- Patient has chronic leg wound with chronic venous stasis changes. 


- stop abx


- Wash leg daily with soap and water. 


- Cover with gauze.   





(5) DVT prophylaxis


Current Visit: Yes   Status: Acute   Priority: High   Code(s): GQR1693 -    

Comment: 


- SQH   





(6) Acidosis, metabolic


Current Visit: Yes   Status: Acute   Priority: High   Code(s): E87.2 - ACIDOSIS

   Comment: 


- Acidosis resolving   





(7) Depression


Current Visit: Yes   Status: Acute   Code(s): F32.9 - MAJOR DEPRESSIVE DISORDER

, SINGLE EPISODE, UNSPECIFIED   Comment: 


- Patient more interactive than earlier in hospitalization


- Was in generally good spirits 11/9/17.   





(8) Elevated troponin


Current Visit: Yes   Status: Acute   Code(s): R74.8 - ABNORMAL LEVELS OF OTHER 

SERUM ENZYMES   Comment: 


- Trop peaked at 0.40. Suspect secondary to demand ischemia and diminished 

renal function (reduced troponin clearance).


- No CP, SOB, or EKG changes. 


- Consider outpatient stress test when recovered from acute illness.   





(9) HLD (hyperlipidemia)


Current Visit: Yes   Status: Chronic   Priority: High   Code(s): E78.5 - 

HYPERLIPIDEMIA, UNSPECIFIED   Comment: 


- Continue simvastatin    





(10) HTN (hypertension)


Current Visit: Yes   Status: Acute   Priority: High   Code(s): I10 - ESSENTIAL (

PRIMARY) HYPERTENSION   Comment: 


- Holding metoprolol.   





(11) Rhabdomyolysis


Current Visit: Yes   Status: Resolved   Code(s): M62.82 - RHABDOMYOLYSIS   

Comment: 


- Resolved.    





(12) UTI (urinary tract infection)


Current Visit: Yes   Status: Resolved   Comment: 


- Asymptomatic.  


- Only 10,000 colonies of proteus.


- On clindamycin for LE wound / levo for poss pna...UTI covered/treated.   





(13) Afib


Current Visit: Yes   Status: Chronic   Priority: Medium   Code(s): I48.91 - 

UNSPECIFIED ATRIAL FIBRILLATION   Comment: 


- Rate controlled, currently off medications


- Eliquis on hold d/t renal function.  


- Patient has documented allergy to warfarin.   





(14) Full code status


Current Visit: Yes   Status: Acute   Priority: High   Code(s): Z78.9 - OTHER 

SPECIFIED HEALTH STATUS   Comment: 


- patient clear about wanting to be full code   


Status and Disposition: 


Inpatient with LOS > 2 days.  Anticipate nursing home placement when medically 

stable.

## 2017-11-13 ENCOUNTER — HOSPITAL ENCOUNTER (INPATIENT)
Dept: HOSPITAL 25 - MED | Age: 62
LOS: 15 days | Discharge: ANOTHER HOSPITAL | DRG: 682 | End: 2017-11-28
Attending: INTERNAL MEDICINE | Admitting: HOSPITALIST
Payer: MEDICARE

## 2017-11-13 VITALS — SYSTOLIC BLOOD PRESSURE: 102 MMHG | DIASTOLIC BLOOD PRESSURE: 56 MMHG

## 2017-11-13 DIAGNOSIS — N18.9: ICD-10-CM

## 2017-11-13 DIAGNOSIS — I87.8: ICD-10-CM

## 2017-11-13 DIAGNOSIS — E66.01: ICD-10-CM

## 2017-11-13 DIAGNOSIS — K57.92: ICD-10-CM

## 2017-11-13 DIAGNOSIS — Z86.14: ICD-10-CM

## 2017-11-13 DIAGNOSIS — E78.5: ICD-10-CM

## 2017-11-13 DIAGNOSIS — I13.0: ICD-10-CM

## 2017-11-13 DIAGNOSIS — F32.9: ICD-10-CM

## 2017-11-13 DIAGNOSIS — Z88.0: ICD-10-CM

## 2017-11-13 DIAGNOSIS — I95.9: ICD-10-CM

## 2017-11-13 DIAGNOSIS — Z88.1: ICD-10-CM

## 2017-11-13 DIAGNOSIS — I89.0: ICD-10-CM

## 2017-11-13 DIAGNOSIS — M62.82: ICD-10-CM

## 2017-11-13 DIAGNOSIS — E11.9: ICD-10-CM

## 2017-11-13 DIAGNOSIS — K58.9: ICD-10-CM

## 2017-11-13 DIAGNOSIS — Z88.8: ICD-10-CM

## 2017-11-13 DIAGNOSIS — R50.9: ICD-10-CM

## 2017-11-13 DIAGNOSIS — E87.2: ICD-10-CM

## 2017-11-13 DIAGNOSIS — R78.81: ICD-10-CM

## 2017-11-13 DIAGNOSIS — R21: ICD-10-CM

## 2017-11-13 DIAGNOSIS — R53.83: ICD-10-CM

## 2017-11-13 DIAGNOSIS — Z99.3: ICD-10-CM

## 2017-11-13 DIAGNOSIS — I48.91: ICD-10-CM

## 2017-11-13 DIAGNOSIS — N50.89: ICD-10-CM

## 2017-11-13 DIAGNOSIS — Z87.891: ICD-10-CM

## 2017-11-13 DIAGNOSIS — B95.62: ICD-10-CM

## 2017-11-13 DIAGNOSIS — Z99.2: ICD-10-CM

## 2017-11-13 DIAGNOSIS — L03.119: ICD-10-CM

## 2017-11-13 DIAGNOSIS — G93.40: ICD-10-CM

## 2017-11-13 DIAGNOSIS — N17.9: Primary | ICD-10-CM

## 2017-11-13 DIAGNOSIS — E72.20: ICD-10-CM

## 2017-11-13 DIAGNOSIS — R74.8: ICD-10-CM

## 2017-11-13 DIAGNOSIS — I50.9: ICD-10-CM

## 2017-11-13 DIAGNOSIS — Z88.2: ICD-10-CM

## 2017-11-13 LAB
ALBUMIN SERPL BCG-MCNC: 2.9 G/DL (ref 3.2–5.2)
ALP SERPL-CCNC: 118 U/L (ref 34–104)
ALT SERPL W P-5'-P-CCNC: 8 U/L (ref 7–52)
ANION GAP SERPL CALC-SCNC: 15 MMOL/L (ref 2–11)
AST SERPL-CCNC: 19 U/L (ref 13–39)
BUN SERPL-MCNC: 91 MG/DL (ref 6–24)
BUN/CREAT SERPL: 12.2 (ref 8–20)
CALCIUM SERPL-MCNC: 8.9 MG/DL (ref 8.6–10.3)
CHLORIDE SERPL-SCNC: 96 MMOL/L (ref 101–111)
GLOBULIN SER CALC-MCNC: 3.6 G/DL (ref 2–4)
GLUCOSE SERPL-MCNC: 72 MG/DL (ref 70–100)
HCO3 SERPL-SCNC: 21 MMOL/L (ref 22–32)
HCT VFR BLD AUTO: 33 % (ref 42–52)
HGB BLD-MCNC: 10.6 G/DL (ref 14–18)
MCH RBC QN AUTO: 29 PG (ref 27–31)
MCHC RBC AUTO-ENTMCNC: 33 G/DL (ref 31–36)
MCV RBC AUTO: 89 FL (ref 80–94)
POTASSIUM SERPL-SCNC: 4.2 MMOL/L (ref 3.5–5)
PROT SERPL-MCNC: 6.5 G/DL (ref 6.4–8.9)
RBC # BLD AUTO: 3.65 10^6/UL (ref 4–5.4)
SODIUM SERPL-SCNC: 132 MMOL/L (ref 133–145)
WBC # BLD AUTO: 10 10^3/UL (ref 3.5–10.8)

## 2017-11-13 PROCEDURE — 85018 HEMOGLOBIN: CPT

## 2017-11-13 PROCEDURE — 80053 COMPREHEN METABOLIC PANEL: CPT

## 2017-11-13 PROCEDURE — 87328 CRYPTOSPORIDIUM AG IA: CPT

## 2017-11-13 PROCEDURE — 90935 HEMODIALYSIS ONE EVALUATION: CPT

## 2017-11-13 PROCEDURE — 83735 ASSAY OF MAGNESIUM: CPT

## 2017-11-13 PROCEDURE — 85025 COMPLETE CBC W/AUTO DIFF WBC: CPT

## 2017-11-13 PROCEDURE — 87150 DNA/RNA AMPLIFIED PROBE: CPT

## 2017-11-13 PROCEDURE — 5A1D70Z PERFORMANCE OF URINARY FILTRATION, INTERMITTENT, LESS THAN 6 HOURS PER DAY: ICD-10-PCS | Performed by: INTERNAL MEDICINE

## 2017-11-13 PROCEDURE — 87209 SMEAR COMPLEX STAIN: CPT

## 2017-11-13 PROCEDURE — 71010: CPT

## 2017-11-13 PROCEDURE — 36415 COLL VENOUS BLD VENIPUNCTURE: CPT

## 2017-11-13 PROCEDURE — 87329 GIARDIA AG IA: CPT

## 2017-11-13 PROCEDURE — 87177 OVA AND PARASITES SMEARS: CPT

## 2017-11-13 PROCEDURE — 83605 ASSAY OF LACTIC ACID: CPT

## 2017-11-13 PROCEDURE — 87205 SMEAR GRAM STAIN: CPT

## 2017-11-13 PROCEDURE — 87899 AGENT NOS ASSAY W/OPTIC: CPT

## 2017-11-13 PROCEDURE — 87045 FECES CULTURE AEROBIC BACT: CPT

## 2017-11-13 PROCEDURE — 87077 CULTURE AEROBIC IDENTIFY: CPT

## 2017-11-13 PROCEDURE — 86140 C-REACTIVE PROTEIN: CPT

## 2017-11-13 PROCEDURE — 87186 SC STD MICRODIL/AGAR DIL: CPT

## 2017-11-13 PROCEDURE — 84100 ASSAY OF PHOSPHORUS: CPT

## 2017-11-13 PROCEDURE — 85014 HEMATOCRIT: CPT

## 2017-11-13 PROCEDURE — 85027 COMPLETE CBC AUTOMATED: CPT

## 2017-11-13 PROCEDURE — 87040 BLOOD CULTURE FOR BACTERIA: CPT

## 2017-11-13 PROCEDURE — G0257 UNSCHED DIALYSIS ESRD PT HOS: HCPCS

## 2017-11-13 PROCEDURE — 87046 STOOL CULTR AEROBIC BACT EA: CPT

## 2017-11-13 PROCEDURE — 80048 BASIC METABOLIC PNL TOTAL CA: CPT

## 2017-11-13 RX ADMIN — RIFAXIMIN SCH MG: 550 TABLET ORAL at 20:59

## 2017-11-13 RX ADMIN — BENZONATATE SCH MG: 100 CAPSULE ORAL at 09:34

## 2017-11-13 RX ADMIN — SODIUM CITRATE AND CITRIC ACID MONOHYDRATE SCH ML: 500; 334 SOLUTION ORAL at 20:59

## 2017-11-13 RX ADMIN — SODIUM CITRATE AND CITRIC ACID MONOHYDRATE SCH ML: 500; 334 SOLUTION ORAL at 09:34

## 2017-11-13 RX ADMIN — PANTOPRAZOLE SODIUM SCH MG: 40 INJECTION, POWDER, FOR SOLUTION INTRAVENOUS at 14:20

## 2017-11-13 RX ADMIN — NYSTATIN SCH APPLIC: 100000 POWDER TOPICAL at 09:37

## 2017-11-13 RX ADMIN — Medication SCH CAP: at 09:34

## 2017-11-13 RX ADMIN — HEPARIN SODIUM SCH UNITS: 5000 INJECTION INTRAVENOUS; SUBCUTANEOUS at 06:15

## 2017-11-13 RX ADMIN — HEPARIN SODIUM SCH UNITS: 5000 INJECTION INTRAVENOUS; SUBCUTANEOUS at 21:00

## 2017-11-13 RX ADMIN — NYSTATIN SCH APPLIC: 100000 POWDER TOPICAL at 21:00

## 2017-11-13 RX ADMIN — INSULIN LISPRO SCH: 100 INJECTION, SOLUTION INTRAVENOUS; SUBCUTANEOUS at 07:29

## 2017-11-13 RX ADMIN — INSULIN LISPRO SCH: 100 INJECTION, SOLUTION INTRAVENOUS; SUBCUTANEOUS at 12:17

## 2017-11-13 RX ADMIN — INSULIN LISPRO SCH UNITS: 100 INJECTION, SOLUTION INTRAVENOUS; SUBCUTANEOUS at 20:59

## 2017-11-13 RX ADMIN — BENZONATATE SCH MG: 100 CAPSULE ORAL at 14:20

## 2017-11-13 RX ADMIN — Medication SCH ML: at 06:15

## 2017-11-13 RX ADMIN — RIFAXIMIN SCH MG: 550 TABLET ORAL at 09:34

## 2017-11-13 RX ADMIN — SODIUM CITRATE AND CITRIC ACID MONOHYDRATE SCH ML: 500; 334 SOLUTION ORAL at 14:20

## 2017-11-13 RX ADMIN — NYSTATIN SCH APPLIC: 100000 POWDER TOPICAL at 14:29

## 2017-11-13 RX ADMIN — HEPARIN SODIUM SCH UNITS: 5000 INJECTION INTRAVENOUS; SUBCUTANEOUS at 14:20

## 2017-11-14 RX ADMIN — INSULIN LISPRO SCH: 100 INJECTION, SOLUTION INTRAVENOUS; SUBCUTANEOUS at 07:57

## 2017-11-14 RX ADMIN — SODIUM CITRATE AND CITRIC ACID MONOHYDRATE SCH ML: 500; 334 SOLUTION ORAL at 14:00

## 2017-11-14 RX ADMIN — SODIUM CITRATE AND CITRIC ACID MONOHYDRATE SCH ML: 500; 334 SOLUTION ORAL at 22:05

## 2017-11-14 RX ADMIN — HEPARIN SODIUM SCH UNITS: 5000 INJECTION INTRAVENOUS; SUBCUTANEOUS at 22:06

## 2017-11-14 RX ADMIN — NYSTATIN SCH APPLIC: 100000 POWDER TOPICAL at 14:00

## 2017-11-14 RX ADMIN — Medication SCH CAP: at 08:03

## 2017-11-14 RX ADMIN — NYSTATIN SCH APPLIC: 100000 POWDER TOPICAL at 22:06

## 2017-11-14 RX ADMIN — INSULIN LISPRO SCH: 100 INJECTION, SOLUTION INTRAVENOUS; SUBCUTANEOUS at 12:34

## 2017-11-14 RX ADMIN — SODIUM CITRATE AND CITRIC ACID MONOHYDRATE SCH ML: 500; 334 SOLUTION ORAL at 08:03

## 2017-11-14 RX ADMIN — RIFAXIMIN SCH MG: 550 TABLET ORAL at 22:06

## 2017-11-14 RX ADMIN — RIFAXIMIN SCH MG: 550 TABLET ORAL at 08:03

## 2017-11-14 RX ADMIN — NYSTATIN SCH APPLIC: 100000 POWDER TOPICAL at 08:03

## 2017-11-14 RX ADMIN — ATORVASTATIN CALCIUM SCH MG: 10 TABLET, FILM COATED ORAL at 17:39

## 2017-11-14 RX ADMIN — HEPARIN SODIUM SCH UNITS: 5000 INJECTION INTRAVENOUS; SUBCUTANEOUS at 05:51

## 2017-11-14 RX ADMIN — OMEPRAZOLE SCH MG: 20 CAPSULE, DELAYED RELEASE ORAL at 05:51

## 2017-11-14 RX ADMIN — HEPARIN SODIUM SCH UNITS: 5000 INJECTION INTRAVENOUS; SUBCUTANEOUS at 14:00

## 2017-11-15 PROCEDURE — 5A1D70Z PERFORMANCE OF URINARY FILTRATION, INTERMITTENT, LESS THAN 6 HOURS PER DAY: ICD-10-PCS | Performed by: INTERNAL MEDICINE

## 2017-11-15 RX ADMIN — SODIUM CITRATE AND CITRIC ACID MONOHYDRATE SCH ML: 500; 334 SOLUTION ORAL at 11:16

## 2017-11-15 RX ADMIN — SODIUM CITRATE AND CITRIC ACID MONOHYDRATE SCH ML: 500; 334 SOLUTION ORAL at 20:33

## 2017-11-15 RX ADMIN — SODIUM CITRATE AND CITRIC ACID MONOHYDRATE SCH ML: 500; 334 SOLUTION ORAL at 15:08

## 2017-11-15 RX ADMIN — OMEPRAZOLE SCH MG: 20 CAPSULE, DELAYED RELEASE ORAL at 06:07

## 2017-11-15 RX ADMIN — NYSTATIN SCH APPLIC: 100000 POWDER TOPICAL at 08:30

## 2017-11-15 RX ADMIN — RIFAXIMIN SCH MG: 550 TABLET ORAL at 20:33

## 2017-11-15 RX ADMIN — RIFAXIMIN SCH MG: 550 TABLET ORAL at 11:16

## 2017-11-15 RX ADMIN — NYSTATIN SCH APPLIC: 100000 POWDER TOPICAL at 20:33

## 2017-11-15 RX ADMIN — ATORVASTATIN CALCIUM SCH MG: 10 TABLET, FILM COATED ORAL at 17:57

## 2017-11-15 RX ADMIN — HEPARIN SODIUM SCH UNITS: 5000 INJECTION INTRAVENOUS; SUBCUTANEOUS at 06:07

## 2017-11-15 RX ADMIN — NYSTATIN SCH APPLIC: 100000 POWDER TOPICAL at 15:10

## 2017-11-15 RX ADMIN — BENZONATATE PRN MG: 100 CAPSULE ORAL at 03:50

## 2017-11-15 RX ADMIN — ONDANSETRON HYDROCHLORIDE PRN MG: 4 TABLET, FILM COATED ORAL at 03:50

## 2017-11-15 RX ADMIN — HEPARIN SODIUM SCH UNITS: 5000 INJECTION INTRAVENOUS; SUBCUTANEOUS at 22:30

## 2017-11-15 RX ADMIN — Medication SCH CAP: at 11:16

## 2017-11-15 RX ADMIN — HEPARIN SODIUM SCH UNITS: 5000 INJECTION INTRAVENOUS; SUBCUTANEOUS at 15:06

## 2017-11-16 RX ADMIN — SODIUM CITRATE AND CITRIC ACID MONOHYDRATE SCH ML: 500; 334 SOLUTION ORAL at 21:39

## 2017-11-16 RX ADMIN — NYSTATIN SCH APPLIC: 100000 POWDER TOPICAL at 09:06

## 2017-11-16 RX ADMIN — BENZONATATE PRN MG: 100 CAPSULE ORAL at 14:02

## 2017-11-16 RX ADMIN — HEPARIN SODIUM SCH UNITS: 5000 INJECTION INTRAVENOUS; SUBCUTANEOUS at 21:36

## 2017-11-16 RX ADMIN — HEPARIN SODIUM SCH UNITS: 5000 INJECTION INTRAVENOUS; SUBCUTANEOUS at 13:59

## 2017-11-16 RX ADMIN — Medication SCH CAP: at 09:05

## 2017-11-16 RX ADMIN — MENTHOL AND BENZOCAINE PRN LOZ: 10; 6 LOZENGE ORAL at 21:44

## 2017-11-16 RX ADMIN — ATORVASTATIN CALCIUM SCH MG: 10 TABLET, FILM COATED ORAL at 17:11

## 2017-11-16 RX ADMIN — NYSTATIN SCH APPLIC: 100000 POWDER TOPICAL at 21:38

## 2017-11-16 RX ADMIN — RIFAXIMIN SCH MG: 550 TABLET ORAL at 09:06

## 2017-11-16 RX ADMIN — HEPARIN SODIUM SCH UNITS: 5000 INJECTION INTRAVENOUS; SUBCUTANEOUS at 06:08

## 2017-11-16 RX ADMIN — RIFAXIMIN SCH MG: 550 TABLET ORAL at 21:39

## 2017-11-16 RX ADMIN — SODIUM CITRATE AND CITRIC ACID MONOHYDRATE SCH ML: 500; 334 SOLUTION ORAL at 14:00

## 2017-11-16 RX ADMIN — NYSTATIN SCH APPLIC: 100000 POWDER TOPICAL at 14:06

## 2017-11-16 RX ADMIN — OMEPRAZOLE SCH MG: 20 CAPSULE, DELAYED RELEASE ORAL at 06:08

## 2017-11-16 RX ADMIN — SODIUM CITRATE AND CITRIC ACID MONOHYDRATE SCH ML: 500; 334 SOLUTION ORAL at 09:05

## 2017-11-16 NOTE — PN
Subjective


Date of Service: 11/16/17


Interval History: 





Patient seen and examined at bedside. Pt reports a dry mouth from being thirsty

, Pt states that he is "a big elias with a big thirst". Denies fever, shortness 

of breath, chest discomfort, N/V/D. Pt encouraged to change positions 

frequently and keep scrotum elevated. Pt reports continues rash and chills.








Family History: Unchanged from Admission


Social History: Unchanged from Admission


Past Medical History: Unchanged from Admission





Objective


Active Medications: 





Acetaminophen (Tylenol Tab*)  650 mg PO Q4H PRN Reason: PAIN


Al Hydrox/Mg Hydrox/Simethicone (Maalox Plus*)  30 ml PO Q6H PRN Reason: 

gastritis


Atorvastatin Calcium (Lipitor*)  10 mg PO 1700 ALEJANDRA


Benzonatate (Tessalon Cap*)  100 mg PO BID PRN Reason: COUGH


Citric Acid/Sodium Citrate (Bicitra*)  15 ml PO TID ALEJANDRA


Dextrose (D50w Syringe 50 Ml*)  12.5 gm IV PUSH .FOR FS < 60 - SS PRN Reason: 

FS < 60


Heparin Sodium (Porcine) (Heparin Vial(*))  5,000 units SUBCUT Q8HR ALEJANDRA


Lactobacillus Rhamnosus (Culturelle*)  1 cap PO DAILY ALEJANDRA


Multi-Ingredient Mouthwash/Gargle (Biotene Dry Mouth Oral Rinse(Nf))  15 ml MT 

QID PRN Reason: Dry mouth


Nystatin (Nystatin Top Powder*)  1 applic TOPICAL TID ALEJANDRA


Omeprazole (Prilosec Cap*)  20 mg PO DAILY@0600 [facility]


Ondansetron HCl (Zofran Tab*)  4 mg PO Q6H PRN Reason: NAUSEA


Rifaximin (Xifaxan*)  550 mg PO BID [facility]


Throat Lozenges (Chloraseptic Kimberly*)  1 kimberly PO Q6H PRN Reason: SORE THROAT





 Vital Signs











  11/15/17 11/15/17 11/16/17





  19:52 20:00 03:07


 


Temperature 98.2 F  97.6 F


 


Pulse Rate 95  95


 


Respiratory 16 16 20





Rate   


 


Blood Pressure 88/53  84/45





(mmHg)   


 


O2 Sat by Pulse 94  92





Oximetry   














  11/16/17 11/16/17 11/16/17





  07:31 07:59 08:00


 


Temperature 97.5 F  


 


Pulse Rate 90  


 


Respiratory 17  18





Rate   


 


Blood Pressure 79/42 82/42 





(mmHg)   


 


O2 Sat by Pulse 94  





Oximetry   














  11/16/17





  12:09


 


Temperature 98.1 F


 


Pulse Rate 99


 


Respiratory 18





Rate 


 


Blood Pressure 





(mmHg) 


 


O2 Sat by Pulse 94





Oximetry 











Oxygen Devices in Use Now: None


Appearance: NAD, laying in bed


Respiratory: Symmetrical Chest Expansion and Respiratory Effort, Clear to 

Auscultation


Cardiovascular: NL Sounds; No Murmurs; No JVD, RRR


Abdominal: NL Sounds; No Tenderness; No Distention


Extremities: - - Significant edema to bilateral LEs


Skin: - - Redness to bilateral LE, rash to left UE and upper left thigh, 

Scrotum edematous and red


Neurological: Alert and Oriented x 3, NL Muscle Strength and Tone


Lines/Tubes/Other Access: Clean, Dry and Intact Other Access - Hemodialysis cath

, site benign


Nutrition: Taking PO's





Assess/Plan/Problems-Billing


Assessment: 





Mr. Blake is a 63 yo male with PMH significant for morbid obesity, CHF, P afib, DM

, HTN, HLD, depression and CKD who presented initially to [facility] 

after a fall and prolonged down time with sepsis secondary to both LE 

cellulitis and an UTI, mild rhabdomyolysis, acute on chronic renal failure, and 

elevated troponins [place] [facility] [place] requiring ongoing HD 

and a swing patient.








- Patient Problems


(1) Acute renal failure


Comment: 


- Acute on CKD


- Appreciate nephrology input. HD ongoing without trouble. 


- Tunneled HD cath in place.


- Continue bicitra for metabolic acidosis.   





(2) Encephalopathy


Code(s): G93.40 - ENCEPHALOPATHY, UNSPECIFIED   SNOMED Code(s): 66925356


   Comment: 


- Remains lucid.


- Altered mental status was a combination of uremia, hyperammonemia, prolonged 

hospitalization/ICU stay (combined uremic and metabolic encephalopathy)


- STOPPED lactulose for diarrhea and skin considerations, will continue 

Rifaximin, which he has been on in the past. 


- Continue HD, as directed by nephrology.   





(3) Acute hypoxemic respiratory failure


Code(s): J96.01 - ACUTE RESPIRATORY FAILURE WITH HYPOXIA   SNOMED Code(s): 

429677638


   Comment: 


- Resolved


- Patient suddenly hypoxic on arrival to ICU on 11/6, unclear etiology. 


- Some concern for PE, but dopplers with no evidence of DVT. 


- Opted against CTA; patient doing well now; strongly doubt PE at this point, 

and CTA would be deleterious given tenuous renal failure and hope for 

improvement in renal function.   





(4) Cellulitis


Code(s): L03.90 - CELLULITIS, UNSPECIFIED   SNOMED Code(s): 695371671


   Comment: 


- Patient has chronic leg wound with chronic venous stasis changes. 


- Completed course of ABX


- Wash leg daily with soap and water. 


- Cover with gauze.   





(5) Acidosis, metabolic


Code(s): E87.2 - ACIDOSIS   SNOMED Code(s): 92769916


   Comment: 


- Acidosis resolving


- Will recheck labs in the AM


- Continue Bicitra for now   





(6) Elevated troponin


Code(s): R74.8 - ABNORMAL LEVELS OF OTHER SERUM ENZYMES   SNOMED Code(s): 

248469570


   Comment: 


- Trop peaked at 0.40. Suspect secondary to demand ischemia and diminished 

renal function (reduced troponin clearance).


- No CP, SOB, or EKG changes. 


- Consider outpatient stress test when recovered from acute illness.   





(7) Depression


Code(s): F32.9 - MAJOR DEPRESSIVE DISORDER, SINGLE EPISODE, UNSPECIFIED   

SNOMED Code(s): 34731406


   Comment: 


- Patient more interactive than earlier in hospitalization   





(8) HTN (hypertension)


Code(s): I10 - ESSENTIAL (PRIMARY) HYPERTENSION   SNOMED Code(s): 80151845


   Comment: 


- Hypotensive, SBP 's


- Continue holding metoprolol.   





(9) Afib


Code(s): I48.91 - UNSPECIFIED ATRIAL FIBRILLATION   SNOMED Code(s): 73613844


   Comment: 


- Rate controlled, currently off medications


- Eliquis on hold d/t renal function.  


- Patient has documented allergy to warfarin.   





(10) Diabetes mellitus


Current Visit: No   Status: Chronic   Code(s): E11.9 - TYPE 2 DIABETES MELLITUS 

WITHOUT COMPLICATIONS   SNOMED Code(s): 23862679


   Comment: 


- SBP 's


- Continue lispro SSI coverage with meals.   





(11) HLD (hyperlipidemia)


Code(s): E78.5 - HYPERLIPIDEMIA, UNSPECIFIED   SNOMED Code(s): 78424646


   Comment: 


- Continue simvastatin    





(12) Rhabdomyolysis


Code(s): M62.82 - RHABDOMYOLYSIS   SNOMED Code(s): 744422665


   Comment: 


- Resolved.    





(13) DVT prophylaxis


Code(s): SII1678 -    SNOMED Code(s): 358351782


   Comment: 


- SQ Heparin   





(14) Full code status


Code(s): Z78.9 - OTHER SPECIFIED HEALTH STATUS   SNOMED Code(s): 989218316


   Comment: 


- Patient clear about wanting to be full code   


Status and Disposition: 





Inpatient. Discharge to SNF vs [facility] when bed is available.

## 2017-11-17 LAB
ANION GAP SERPL CALC-SCNC: 13 MMOL/L (ref 2–11)
BUN SERPL-MCNC: 96 MG/DL (ref 6–24)
BUN/CREAT SERPL: 13 (ref 8–20)
CALCIUM SERPL-MCNC: 8.7 MG/DL (ref 8.6–10.3)
CHLORIDE SERPL-SCNC: 96 MMOL/L (ref 101–111)
GLUCOSE SERPL-MCNC: 97 MG/DL (ref 70–100)
HCO3 SERPL-SCNC: 21 MMOL/L (ref 22–32)
POTASSIUM SERPL-SCNC: 4.7 MMOL/L (ref 3.5–5)
SODIUM SERPL-SCNC: 130 MMOL/L (ref 133–145)

## 2017-11-17 PROCEDURE — 5A1D70Z PERFORMANCE OF URINARY FILTRATION, INTERMITTENT, LESS THAN 6 HOURS PER DAY: ICD-10-PCS | Performed by: INTERNAL MEDICINE

## 2017-11-17 RX ADMIN — SODIUM CITRATE AND CITRIC ACID MONOHYDRATE SCH ML: 500; 334 SOLUTION ORAL at 14:38

## 2017-11-17 RX ADMIN — RIFAXIMIN SCH MG: 550 TABLET ORAL at 23:18

## 2017-11-17 RX ADMIN — RIFAXIMIN SCH MG: 550 TABLET ORAL at 09:51

## 2017-11-17 RX ADMIN — HEPARIN SODIUM SCH UNITS: 5000 INJECTION INTRAVENOUS; SUBCUTANEOUS at 23:17

## 2017-11-17 RX ADMIN — HEPARIN SODIUM SCH UNITS: 5000 INJECTION INTRAVENOUS; SUBCUTANEOUS at 14:38

## 2017-11-17 RX ADMIN — MIDODRINE HYDROCHLORIDE SCH MG: 5 TABLET ORAL at 23:18

## 2017-11-17 RX ADMIN — NYSTATIN SCH: 100000 POWDER TOPICAL at 17:07

## 2017-11-17 RX ADMIN — Medication SCH CAP: at 09:51

## 2017-11-17 RX ADMIN — NYSTATIN SCH APPLIC: 100000 POWDER TOPICAL at 09:55

## 2017-11-17 RX ADMIN — SODIUM CITRATE AND CITRIC ACID MONOHYDRATE SCH ML: 500; 334 SOLUTION ORAL at 09:51

## 2017-11-17 RX ADMIN — HEPARIN SODIUM SCH UNITS: 5000 INJECTION INTRAVENOUS; SUBCUTANEOUS at 06:11

## 2017-11-17 RX ADMIN — SODIUM CITRATE AND CITRIC ACID MONOHYDRATE SCH ML: 500; 334 SOLUTION ORAL at 23:19

## 2017-11-17 RX ADMIN — OMEPRAZOLE SCH MG: 20 CAPSULE, DELAYED RELEASE ORAL at 06:11

## 2017-11-17 RX ADMIN — MENTHOL AND BENZOCAINE PRN LOZ: 10; 6 LOZENGE ORAL at 09:51

## 2017-11-17 RX ADMIN — NYSTATIN SCH APPLIC: 100000 POWDER TOPICAL at 23:18

## 2017-11-17 RX ADMIN — ATORVASTATIN CALCIUM SCH MG: 10 TABLET, FILM COATED ORAL at 17:51

## 2017-11-17 NOTE — PN
Hospitalist Progress Note





Pt has been hypotensive with SBP 70-80's at baseline. Pt dropped down to SBP in 

the 40-60's during dialysis today. Dialysis nurse noted lethargy when SBP in 40-

60's. Pt's BP has now improved back to his baseline. His mentation is now back 

to baseline.





Discussed with Dr. Hernandez and will start Midodrine 5mg TID.





Consider starting wrapping legs if he continues to be hypotensive.

## 2017-11-17 NOTE — PN
PROGRESS NOTE:

 

DATE OF SERVICE / DICTATION:  11/16/17

 

HISTORY:  Mr. Blake continues to be dialyzed on a regular basis.  He is 
tolerating dialysis well and we removed a significant amount of fluid from him.
  Unfortunately, we have no evidence of any recovery of renal function.  In the 
past, he has had a couple of occasions where he has had episodes of renal 
failure and required hemodialysis and then recovered function.  This does not 
look to be the case at the present time.  As a result, I think we can now say 
that he has end-stage renal disease.  We will continue to try to ultrafilter up 
additional fluid as he is still quite edematous even though we have removed at 
least 25 kg from him.

 

 [id number]/CPS #: [id number]

[facility]

## 2017-11-18 LAB
HCT VFR BLD AUTO: 32 % (ref 42–52)
HGB BLD-MCNC: 10.3 G/DL (ref 14–18)
MCH RBC QN AUTO: 29 PG (ref 27–31)
MCHC RBC AUTO-ENTMCNC: 33 G/DL (ref 31–36)
MCV RBC AUTO: 90 FL (ref 80–94)
RBC # BLD AUTO: 3.54 10^6/UL (ref 4–5.4)
WBC # BLD AUTO: 9.8 10^3/UL (ref 3.5–10.8)

## 2017-11-18 RX ADMIN — NYSTATIN SCH APPLIC: 100000 POWDER TOPICAL at 17:14

## 2017-11-18 RX ADMIN — OMEPRAZOLE SCH MG: 20 CAPSULE, DELAYED RELEASE ORAL at 06:09

## 2017-11-18 RX ADMIN — RIFAXIMIN SCH MG: 550 TABLET ORAL at 09:53

## 2017-11-18 RX ADMIN — MIDODRINE HYDROCHLORIDE SCH MG: 5 TABLET ORAL at 15:09

## 2017-11-18 RX ADMIN — NYSTATIN SCH APPLIC: 100000 POWDER TOPICAL at 09:54

## 2017-11-18 RX ADMIN — ONDANSETRON HYDROCHLORIDE PRN MG: 4 TABLET, FILM COATED ORAL at 15:09

## 2017-11-18 RX ADMIN — HEPARIN SODIUM SCH UNITS: 5000 INJECTION INTRAVENOUS; SUBCUTANEOUS at 15:09

## 2017-11-18 RX ADMIN — HEPARIN SODIUM SCH UNITS: 5000 INJECTION INTRAVENOUS; SUBCUTANEOUS at 20:45

## 2017-11-18 RX ADMIN — MENTHOL AND BENZOCAINE PRN LOZ: 10; 6 LOZENGE ORAL at 15:09

## 2017-11-18 RX ADMIN — HEPARIN SODIUM SCH UNITS: 5000 INJECTION INTRAVENOUS; SUBCUTANEOUS at 06:09

## 2017-11-18 RX ADMIN — RIFAXIMIN SCH MG: 550 TABLET ORAL at 20:46

## 2017-11-18 RX ADMIN — MIDODRINE HYDROCHLORIDE SCH MG: 5 TABLET ORAL at 20:46

## 2017-11-18 RX ADMIN — NYSTATIN SCH APPLIC: 100000 POWDER TOPICAL at 20:46

## 2017-11-18 RX ADMIN — ATORVASTATIN CALCIUM SCH MG: 10 TABLET, FILM COATED ORAL at 18:21

## 2017-11-18 RX ADMIN — SODIUM CITRATE AND CITRIC ACID MONOHYDRATE SCH ML: 500; 334 SOLUTION ORAL at 20:45

## 2017-11-18 RX ADMIN — SODIUM CITRATE AND CITRIC ACID MONOHYDRATE SCH ML: 500; 334 SOLUTION ORAL at 15:09

## 2017-11-18 RX ADMIN — MIDODRINE HYDROCHLORIDE SCH MG: 5 TABLET ORAL at 09:53

## 2017-11-18 RX ADMIN — Medication SCH CAP: at 09:52

## 2017-11-18 RX ADMIN — SODIUM CITRATE AND CITRIC ACID MONOHYDRATE SCH ML: 500; 334 SOLUTION ORAL at 09:52

## 2017-11-19 RX ADMIN — OMEPRAZOLE SCH MG: 20 CAPSULE, DELAYED RELEASE ORAL at 06:17

## 2017-11-19 RX ADMIN — MIDODRINE HYDROCHLORIDE SCH MG: 5 TABLET ORAL at 20:39

## 2017-11-19 RX ADMIN — NYSTATIN SCH APPLIC: 100000 POWDER TOPICAL at 09:55

## 2017-11-19 RX ADMIN — MENTHOL AND BENZOCAINE PRN LOZ: 10; 6 LOZENGE ORAL at 19:41

## 2017-11-19 RX ADMIN — HEPARIN SODIUM SCH UNITS: 5000 INJECTION INTRAVENOUS; SUBCUTANEOUS at 06:17

## 2017-11-19 RX ADMIN — MIDODRINE HYDROCHLORIDE SCH MG: 5 TABLET ORAL at 09:55

## 2017-11-19 RX ADMIN — RIFAXIMIN SCH MG: 550 TABLET ORAL at 09:54

## 2017-11-19 RX ADMIN — HEPARIN SODIUM SCH UNITS: 5000 INJECTION INTRAVENOUS; SUBCUTANEOUS at 14:29

## 2017-11-19 RX ADMIN — MIDODRINE HYDROCHLORIDE SCH MG: 5 TABLET ORAL at 14:29

## 2017-11-19 RX ADMIN — SODIUM CITRATE AND CITRIC ACID MONOHYDRATE SCH ML: 500; 334 SOLUTION ORAL at 09:55

## 2017-11-19 RX ADMIN — RIFAXIMIN SCH MG: 550 TABLET ORAL at 20:39

## 2017-11-19 RX ADMIN — HEPARIN SODIUM SCH UNITS: 5000 INJECTION INTRAVENOUS; SUBCUTANEOUS at 20:45

## 2017-11-19 RX ADMIN — Medication SCH CAP: at 09:54

## 2017-11-19 RX ADMIN — NYSTATIN SCH APPLIC: 100000 POWDER TOPICAL at 20:40

## 2017-11-19 RX ADMIN — ATORVASTATIN CALCIUM SCH MG: 10 TABLET, FILM COATED ORAL at 16:15

## 2017-11-19 RX ADMIN — SODIUM CITRATE AND CITRIC ACID MONOHYDRATE SCH ML: 500; 334 SOLUTION ORAL at 14:29

## 2017-11-19 RX ADMIN — MENTHOL AND BENZOCAINE PRN LOZ: 10; 6 LOZENGE ORAL at 23:54

## 2017-11-19 RX ADMIN — SODIUM CITRATE AND CITRIC ACID MONOHYDRATE SCH ML: 500; 334 SOLUTION ORAL at 20:39

## 2017-11-19 RX ADMIN — NYSTATIN SCH APPLIC: 100000 POWDER TOPICAL at 14:30

## 2017-11-20 PROCEDURE — 5A1D70Z PERFORMANCE OF URINARY FILTRATION, INTERMITTENT, LESS THAN 6 HOURS PER DAY: ICD-10-PCS | Performed by: INTERNAL MEDICINE

## 2017-11-20 RX ADMIN — MIDODRINE HYDROCHLORIDE SCH MG: 5 TABLET ORAL at 09:19

## 2017-11-20 RX ADMIN — MIDODRINE HYDROCHLORIDE SCH MG: 5 TABLET ORAL at 14:23

## 2017-11-20 RX ADMIN — HEPARIN SODIUM SCH UNITS: 5000 INJECTION INTRAVENOUS; SUBCUTANEOUS at 21:08

## 2017-11-20 RX ADMIN — NYSTATIN SCH APPLIC: 100000 POWDER TOPICAL at 09:20

## 2017-11-20 RX ADMIN — RIFAXIMIN SCH MG: 550 TABLET ORAL at 09:19

## 2017-11-20 RX ADMIN — MIDODRINE HYDROCHLORIDE SCH MG: 5 TABLET ORAL at 21:08

## 2017-11-20 RX ADMIN — Medication SCH CAP: at 09:19

## 2017-11-20 RX ADMIN — HEPARIN SODIUM SCH UNITS: 5000 INJECTION INTRAVENOUS; SUBCUTANEOUS at 14:24

## 2017-11-20 RX ADMIN — RIFAXIMIN SCH MG: 550 TABLET ORAL at 21:08

## 2017-11-20 RX ADMIN — ATORVASTATIN CALCIUM SCH MG: 10 TABLET, FILM COATED ORAL at 21:08

## 2017-11-20 RX ADMIN — NYSTATIN SCH APPLIC: 100000 POWDER TOPICAL at 21:52

## 2017-11-20 RX ADMIN — SODIUM CITRATE AND CITRIC ACID MONOHYDRATE SCH ML: 500; 334 SOLUTION ORAL at 14:24

## 2017-11-20 RX ADMIN — HEPARIN SODIUM SCH UNITS: 5000 INJECTION INTRAVENOUS; SUBCUTANEOUS at 05:25

## 2017-11-20 RX ADMIN — SODIUM CITRATE AND CITRIC ACID MONOHYDRATE SCH ML: 500; 334 SOLUTION ORAL at 21:08

## 2017-11-20 RX ADMIN — NYSTATIN SCH APPLIC: 100000 POWDER TOPICAL at 14:25

## 2017-11-20 RX ADMIN — ACETAMINOPHEN PRN MG: 325 TABLET ORAL at 02:18

## 2017-11-20 RX ADMIN — OMEPRAZOLE SCH MG: 20 CAPSULE, DELAYED RELEASE ORAL at 05:26

## 2017-11-20 RX ADMIN — SODIUM CITRATE AND CITRIC ACID MONOHYDRATE SCH ML: 500; 334 SOLUTION ORAL at 09:19

## 2017-11-21 RX ADMIN — SODIUM CITRATE AND CITRIC ACID MONOHYDRATE SCH ML: 500; 334 SOLUTION ORAL at 21:28

## 2017-11-21 RX ADMIN — OMEPRAZOLE SCH MG: 20 CAPSULE, DELAYED RELEASE ORAL at 05:39

## 2017-11-21 RX ADMIN — SODIUM CITRATE AND CITRIC ACID MONOHYDRATE SCH ML: 500; 334 SOLUTION ORAL at 15:36

## 2017-11-21 RX ADMIN — ATORVASTATIN CALCIUM SCH MG: 10 TABLET, FILM COATED ORAL at 15:37

## 2017-11-21 RX ADMIN — MIDODRINE HYDROCHLORIDE SCH MG: 5 TABLET ORAL at 21:28

## 2017-11-21 RX ADMIN — MIDODRINE HYDROCHLORIDE SCH MG: 5 TABLET ORAL at 15:37

## 2017-11-21 RX ADMIN — OMEPRAZOLE SCH MG: 20 CAPSULE, DELAYED RELEASE ORAL at 06:07

## 2017-11-21 RX ADMIN — Medication SCH CAP: at 09:52

## 2017-11-21 RX ADMIN — NYSTATIN SCH APPLIC: 100000 POWDER TOPICAL at 15:36

## 2017-11-21 RX ADMIN — SODIUM CITRATE AND CITRIC ACID MONOHYDRATE SCH ML: 500; 334 SOLUTION ORAL at 09:53

## 2017-11-21 RX ADMIN — MENTHOL AND BENZOCAINE PRN LOZ: 10; 6 LOZENGE ORAL at 05:39

## 2017-11-21 RX ADMIN — RIFAXIMIN SCH MG: 550 TABLET ORAL at 21:28

## 2017-11-21 RX ADMIN — MIDODRINE HYDROCHLORIDE SCH MG: 5 TABLET ORAL at 09:52

## 2017-11-21 RX ADMIN — HEPARIN SODIUM SCH UNITS: 5000 INJECTION INTRAVENOUS; SUBCUTANEOUS at 21:28

## 2017-11-21 RX ADMIN — HEPARIN SODIUM SCH UNITS: 5000 INJECTION INTRAVENOUS; SUBCUTANEOUS at 05:39

## 2017-11-21 RX ADMIN — MENTHOL AND BENZOCAINE PRN LOZ: 10; 6 LOZENGE ORAL at 01:08

## 2017-11-21 RX ADMIN — NYSTATIN SCH APPLIC: 100000 POWDER TOPICAL at 21:29

## 2017-11-21 RX ADMIN — NYSTATIN SCH APPLIC: 100000 POWDER TOPICAL at 09:53

## 2017-11-21 RX ADMIN — HEPARIN SODIUM SCH UNITS: 5000 INJECTION INTRAVENOUS; SUBCUTANEOUS at 15:37

## 2017-11-21 RX ADMIN — RIFAXIMIN SCH MG: 550 TABLET ORAL at 09:52

## 2017-11-22 LAB
HCT VFR BLD AUTO: 31 % (ref 42–52)
HGB BLD-MCNC: 10.1 G/DL (ref 14–18)

## 2017-11-22 PROCEDURE — 5A1D70Z PERFORMANCE OF URINARY FILTRATION, INTERMITTENT, LESS THAN 6 HOURS PER DAY: ICD-10-PCS | Performed by: INTERNAL MEDICINE

## 2017-11-22 RX ADMIN — MIDODRINE HYDROCHLORIDE SCH MG: 5 TABLET ORAL at 09:47

## 2017-11-22 RX ADMIN — SODIUM CITRATE AND CITRIC ACID MONOHYDRATE SCH ML: 500; 334 SOLUTION ORAL at 09:47

## 2017-11-22 RX ADMIN — SODIUM CITRATE AND CITRIC ACID MONOHYDRATE SCH ML: 500; 334 SOLUTION ORAL at 15:49

## 2017-11-22 RX ADMIN — Medication SCH CAP: at 09:47

## 2017-11-22 RX ADMIN — MIDODRINE HYDROCHLORIDE SCH MG: 5 TABLET ORAL at 15:49

## 2017-11-22 RX ADMIN — HEPARIN SODIUM SCH: 5000 INJECTION INTRAVENOUS; SUBCUTANEOUS at 15:40

## 2017-11-22 RX ADMIN — HEPARIN SODIUM SCH UNITS: 5000 INJECTION INTRAVENOUS; SUBCUTANEOUS at 21:31

## 2017-11-22 RX ADMIN — NYSTATIN SCH APPLIC: 100000 POWDER TOPICAL at 21:39

## 2017-11-22 RX ADMIN — ATORVASTATIN CALCIUM SCH MG: 10 TABLET, FILM COATED ORAL at 15:49

## 2017-11-22 RX ADMIN — HEPARIN SODIUM SCH UNITS: 5000 INJECTION INTRAVENOUS; SUBCUTANEOUS at 06:16

## 2017-11-22 RX ADMIN — RIFAXIMIN SCH MG: 550 TABLET ORAL at 09:47

## 2017-11-22 RX ADMIN — OMEPRAZOLE SCH MG: 20 CAPSULE, DELAYED RELEASE ORAL at 06:16

## 2017-11-22 RX ADMIN — NYSTATIN SCH APPLIC: 100000 POWDER TOPICAL at 15:49

## 2017-11-22 RX ADMIN — MIDODRINE HYDROCHLORIDE SCH MG: 5 TABLET ORAL at 21:31

## 2017-11-22 RX ADMIN — ONDANSETRON HYDROCHLORIDE PRN MG: 4 TABLET, FILM COATED ORAL at 06:15

## 2017-11-22 RX ADMIN — NYSTATIN SCH APPLIC: 100000 POWDER TOPICAL at 09:47

## 2017-11-22 RX ADMIN — RIFAXIMIN SCH MG: 550 TABLET ORAL at 21:31

## 2017-11-22 RX ADMIN — MENTHOL AND BENZOCAINE PRN LOZ: 10; 6 LOZENGE ORAL at 06:16

## 2017-11-22 RX ADMIN — MENTHOL AND BENZOCAINE PRN LOZ: 10; 6 LOZENGE ORAL at 18:15

## 2017-11-22 RX ADMIN — SODIUM CITRATE AND CITRIC ACID MONOHYDRATE SCH ML: 500; 334 SOLUTION ORAL at 21:31

## 2017-11-22 NOTE — PN
Subjective


Date of Service: 11/22/17


Interval History: 





Patient seen and examined at bedside. Denies fever, shortness of breath, chest 

discomfort, N/V. Pt reports chills and diarrhea. NSG staff report ~ 2 BMs q 

shift. 








Family History: Unchanged from Admission


Social History: Unchanged from Admission


Past Medical History: Unchanged from Admission





Objective


Active Medications: 





Acetaminophen (Tylenol Tab*)  650 mg PO Q4H PRN Reason: PAIN


Al Hydrox/Mg Hydrox/Simethicone (Maalox Plus*)  30 ml PO Q6H PRN Reason: 

gastritis


Atorvastatin Calcium (Lipitor*)  10 mg PO 1700 ALEJANDRA


Benzonatate (Tessalon Cap*)  100 mg PO BID PRN Reason: COUGH


Citric Acid/Sodium Citrate (Bicitra*)  15 ml PO TID ALEJANDRA


Dextrose (D50w Syringe 50 Ml*)  12.5 gm IV PUSH .FOR FS < 60 - SS PRN Reason: 

FS < 60


Heparin Sodium (Porcine) (Heparin Vial(*))  5,000 units SUBCUT Q8HR ALEJANDRA


Heparin Sodium (Porcine) (Heparin Dialysis Only(*))  5,000 units DIALYSIS ONCE 

ONE


   Stop: 11/22/17 12:01


Lactobacillus Rhamnosus (Culturelle*)  1 cap PO DAILY ALEJANDRA


Midodrine (Midodrine (Nf))  5 mg PO TID ALEJANDRA


Multi-Ingredient Mouthwash/Gargle (Biotene Dry Mouth Oral Rinse(Nf))  15 ml MT 

FIVE TIMES DAILY PRN Reason: DRY MOUTH


Nystatin (Nystatin Top Powder*)  1 applic TOPICAL TID ALEJANDRA


Omeprazole (Prilosec Cap*)  20 mg PO DAILY@0600 [facility]


Ondansetron HCl (Zofran Tab*)  4 mg PO Q6H PRN Reason: NAUSEA


Rifaximin (Xifaxan*)  550 mg PO BID [facility]


Throat Lozenges (Chloraseptic Kimberly*)  1 kimberly PO Q4H PRN Reason: SORE THROAT





 Vital Signs











  11/21/17 11/22/17 11/22/17





  21:38 03:34 08:53


 


Temperature  98.5 F 


 


Pulse Rate  108 


 


Respiratory 20 20 18





Rate   


 


Blood Pressure  85/45 





(mmHg)   


 


O2 Sat by Pulse  96 





Oximetry   











Oxygen Devices in Use Now: None


Appearance: NAD, laying in bed


Respiratory: Symmetrical Chest Expansion and Respiratory Effort, Clear to 

Auscultation


Cardiovascular: NL Sounds; No Murmurs; No JVD, RRR


Abdominal: NL Sounds; No Tenderness; No Distention


Neurological: Alert and Oriented x 3


Lines/Tubes/Other Access: Clean, Dry and Intact Other Access - Dialysis cath, 

site benign


Nutrition: Taking PO's


Result Diagrams: 


 11/22/17 05:52





 11/17/17 04:49


Microbiology and Other Data: 


 Microbiology











 11/18/17 11:31 Aerobic Blood Culture - Preliminary





 Blood Venous    No Growth Day 4





 Anaerobic Blood Culture - Preliminary





    No Growth Day 4


 


 11/20/17 20:25 Stool Gross Appearance - Final





 Stool Shiga Toxin I & II - Final





    Negative Shiga Toxin 1 & 2





 Cryptosporidium/Giardia - Final





    Neg Cryptosporidium/Giardia


 


 11/18/17 13:16 Aerobic Blood Culture - Preliminary





 Blood Venous    No Growth Day 3





 Anaerobic Blood Culture - Preliminary





    No Growth Day 3














Assess/Plan/Problems-Billing


Assessment: 





Mr. Blake is a 61 yo male with PMH significant for morbid obesity, CHF, P afib, DM

, HTN, HLD, depression and CKD who presented initially to [facility] 

after a fall and prolonged down time with sepsis secondary to both LE 

cellulitis and an UTI, mild rhabdomyolysis, acute on chronic renal failure, and 

elevated troponins who was [place] [facility] [place] and is not requiring ongoing HD 

and a swing patient.








- Patient Problems


(1) Acute renal failure


Comment: 


- Acute on CKD


- Appreciate nephrology input. HD ongoing without trouble. 


- Tunneled HD cath in place.


- Continue bicitra for metabolic acidosis.


- Continue HD, as directed by nephrology.   





(2) Encephalopathy


Code(s): G93.40 - ENCEPHALOPATHY, UNSPECIFIED   SNOMED Code(s): 36283388


   Comment: 


- Resolved


   





(3) Acute hypoxemic respiratory failure


Code(s): J96.01 - ACUTE RESPIRATORY FAILURE WITH HYPOXIA   SNOMED Code(s): 

200784653


   Comment: 


- Resolved   





(4) Cellulitis


Code(s): L03.90 - CELLULITIS, UNSPECIFIED   SNOMED Code(s): 198189508


   Comment: 


- Resolved


- Patient has chronic leg wound with chronic venous stasis changes. 


- Completed course of ABX


- Wash leg daily with soap and water. 


- Cover with gauze and ACE wraps.   





(5) Acidosis, metabolic


Code(s): E87.2 - ACIDOSIS   SNOMED Code(s): 45045122


   Comment: 


- Acidosis resolving


- Continue Bicitra for now   





(6) Elevated troponin


Code(s): R74.8 - ABNORMAL LEVELS OF OTHER SERUM ENZYMES   SNOMED Code(s): 

536012493


   Comment: 


- Trop peaked at 0.40. Suspect secondary to demand ischemia and diminished 

renal function (reduced troponin clearance).


- No CP, SOB, or EKG changes. 


- Consider outpatient stress test when recovered from acute illness.   





(7) Depression


Code(s): F32.9 - MAJOR DEPRESSIVE DISORDER, SINGLE EPISODE, UNSPECIFIED   

SNOMED Code(s): 00532434


   Comment: 


- Patient more interactive than earlier in hospitalization   





(8) HTN (hypertension)


Code(s): I10 - ESSENTIAL (PRIMARY) HYPERTENSION   SNOMED Code(s): 64309707


   Comment: 


- Hypotensive, SBP 's


- SBP improving with Midodrine TID


- Continue holding metoprolol.   





(9) Afib


Code(s): I48.91 - UNSPECIFIED ATRIAL FIBRILLATION   SNOMED Code(s): 57036608


   Comment: 


- Rate controlled, currently off medications


- Eliquis on hold d/t renal function.  


- Patient has documented allergy to warfarin.   





(10) Diabetes mellitus


Current Visit: No   Status: Chronic   Code(s): E11.9 - TYPE 2 DIABETES MELLITUS 

WITHOUT COMPLICATIONS   SNOMED Code(s): 45857037


   Comment: 


- -120's


- Continue lispro SSI coverage with meals.   





(11) HLD (hyperlipidemia)


Code(s): E78.5 - HYPERLIPIDEMIA, UNSPECIFIED   SNOMED Code(s): 59689890


   Comment: 


- Continue simvastatin    





(12) Rhabdomyolysis


Code(s): M62.82 - RHABDOMYOLYSIS   SNOMED Code(s): 559726222


   Comment: 


- Resolved.    





(13) DVT prophylaxis


Code(s): KGJ4108 -    SNOMED Code(s): 073240717


   Comment: 


- SQ Heparin   





(14) Full code status


Code(s): Z78.9 - OTHER SPECIFIED HEALTH STATUS   SNOMED Code(s): 670940950


   Comment: 


- Patient clear about wanting to be full code   


Status and Disposition: 





Inpatient. Pt is stable for discharge to SNF vs [facility] when bed is available.

## 2017-11-23 RX ADMIN — NYSTATIN SCH APPLIC: 100000 POWDER TOPICAL at 21:35

## 2017-11-23 RX ADMIN — HEPARIN SODIUM SCH UNITS: 5000 INJECTION INTRAVENOUS; SUBCUTANEOUS at 21:35

## 2017-11-23 RX ADMIN — Medication SCH: at 16:33

## 2017-11-23 RX ADMIN — NYSTATIN SCH APPLIC: 100000 POWDER TOPICAL at 13:46

## 2017-11-23 RX ADMIN — HEPARIN SODIUM SCH UNITS: 5000 INJECTION INTRAVENOUS; SUBCUTANEOUS at 05:56

## 2017-11-23 RX ADMIN — MENTHOL AND BENZOCAINE PRN LOZ: 10; 6 LOZENGE ORAL at 05:55

## 2017-11-23 RX ADMIN — NYSTATIN SCH APPLIC: 100000 POWDER TOPICAL at 07:50

## 2017-11-23 RX ADMIN — SODIUM CITRATE AND CITRIC ACID MONOHYDRATE SCH ML: 500; 334 SOLUTION ORAL at 07:50

## 2017-11-23 RX ADMIN — MIDODRINE HYDROCHLORIDE SCH MG: 5 TABLET ORAL at 13:46

## 2017-11-23 RX ADMIN — Medication SCH CAP: at 07:50

## 2017-11-23 RX ADMIN — SODIUM CITRATE AND CITRIC ACID MONOHYDRATE SCH ML: 500; 334 SOLUTION ORAL at 21:35

## 2017-11-23 RX ADMIN — RIFAXIMIN SCH MG: 550 TABLET ORAL at 21:35

## 2017-11-23 RX ADMIN — MIDODRINE HYDROCHLORIDE SCH MG: 5 TABLET ORAL at 21:35

## 2017-11-23 RX ADMIN — MIDODRINE HYDROCHLORIDE SCH MG: 5 TABLET ORAL at 07:50

## 2017-11-23 RX ADMIN — ATORVASTATIN CALCIUM SCH MG: 10 TABLET, FILM COATED ORAL at 16:33

## 2017-11-23 RX ADMIN — SODIUM CITRATE AND CITRIC ACID MONOHYDRATE SCH ML: 500; 334 SOLUTION ORAL at 13:46

## 2017-11-23 RX ADMIN — RIFAXIMIN SCH MG: 550 TABLET ORAL at 07:50

## 2017-11-23 RX ADMIN — HEPARIN SODIUM SCH UNITS: 5000 INJECTION INTRAVENOUS; SUBCUTANEOUS at 13:46

## 2017-11-23 RX ADMIN — OMEPRAZOLE SCH MG: 20 CAPSULE, DELAYED RELEASE ORAL at 05:54

## 2017-11-24 LAB
ANION GAP SERPL CALC-SCNC: 12 MMOL/L (ref 2–11)
BUN SERPL-MCNC: 87 MG/DL (ref 6–24)
BUN/CREAT SERPL: 12.5 (ref 8–20)
CALCIUM SERPL-MCNC: 8.9 MG/DL (ref 8.6–10.3)
CHLORIDE SERPL-SCNC: 96 MMOL/L (ref 101–111)
GLUCOSE SERPL-MCNC: 105 MG/DL (ref 70–100)
HCO3 SERPL-SCNC: 23 MMOL/L (ref 22–32)
POTASSIUM SERPL-SCNC: 4.3 MMOL/L (ref 3.5–5)
SODIUM SERPL-SCNC: 131 MMOL/L (ref 133–145)

## 2017-11-24 PROCEDURE — 5A1D70Z PERFORMANCE OF URINARY FILTRATION, INTERMITTENT, LESS THAN 6 HOURS PER DAY: ICD-10-PCS | Performed by: INTERNAL MEDICINE

## 2017-11-24 RX ADMIN — MENTHOL AND BENZOCAINE PRN LOZ: 10; 6 LOZENGE ORAL at 10:54

## 2017-11-24 RX ADMIN — NYSTATIN SCH APPLIC: 100000 POWDER TOPICAL at 20:42

## 2017-11-24 RX ADMIN — SODIUM CITRATE AND CITRIC ACID MONOHYDRATE SCH: 500; 334 SOLUTION ORAL at 17:29

## 2017-11-24 RX ADMIN — Medication SCH ML: at 13:54

## 2017-11-24 RX ADMIN — Medication SCH: at 05:31

## 2017-11-24 RX ADMIN — RIFAXIMIN SCH MG: 550 TABLET ORAL at 10:00

## 2017-11-24 RX ADMIN — Medication SCH: at 17:57

## 2017-11-24 RX ADMIN — HEPARIN SODIUM SCH UNITS: 5000 INJECTION INTRAVENOUS; SUBCUTANEOUS at 21:28

## 2017-11-24 RX ADMIN — ATORVASTATIN CALCIUM SCH MG: 10 TABLET, FILM COATED ORAL at 17:53

## 2017-11-24 RX ADMIN — SODIUM CITRATE AND CITRIC ACID MONOHYDRATE SCH ML: 500; 334 SOLUTION ORAL at 10:00

## 2017-11-24 RX ADMIN — NYSTATIN SCH APPLIC: 100000 POWDER TOPICAL at 10:01

## 2017-11-24 RX ADMIN — MIDODRINE HYDROCHLORIDE SCH MG: 5 TABLET ORAL at 10:00

## 2017-11-24 RX ADMIN — MENTHOL AND BENZOCAINE PRN LOZ: 10; 6 LOZENGE ORAL at 20:39

## 2017-11-24 RX ADMIN — HEPARIN SODIUM SCH: 5000 INJECTION INTRAVENOUS; SUBCUTANEOUS at 17:28

## 2017-11-24 RX ADMIN — MENTHOL AND BENZOCAINE PRN LOZ: 10; 6 LOZENGE ORAL at 05:35

## 2017-11-24 RX ADMIN — SODIUM CITRATE AND CITRIC ACID MONOHYDRATE SCH ML: 500; 334 SOLUTION ORAL at 20:38

## 2017-11-24 RX ADMIN — HEPARIN SODIUM SCH UNITS: 5000 INJECTION INTRAVENOUS; SUBCUTANEOUS at 05:30

## 2017-11-24 RX ADMIN — RIFAXIMIN SCH MG: 550 TABLET ORAL at 20:39

## 2017-11-24 RX ADMIN — Medication SCH CAP: at 10:00

## 2017-11-24 RX ADMIN — Medication SCH ML: at 12:26

## 2017-11-24 RX ADMIN — MIDODRINE HYDROCHLORIDE SCH MG: 5 TABLET ORAL at 20:39

## 2017-11-24 RX ADMIN — NYSTATIN SCH APPLIC: 100000 POWDER TOPICAL at 17:53

## 2017-11-24 RX ADMIN — MIDODRINE HYDROCHLORIDE SCH: 5 TABLET ORAL at 17:29

## 2017-11-24 RX ADMIN — OMEPRAZOLE SCH MG: 20 CAPSULE, DELAYED RELEASE ORAL at 05:30

## 2017-11-25 LAB
ALBUMIN SERPL BCG-MCNC: 2.6 G/DL (ref 3.2–5.2)
ALP SERPL-CCNC: 118 U/L (ref 34–104)
ALT SERPL W P-5'-P-CCNC: 6 U/L (ref 7–52)
ANION GAP SERPL CALC-SCNC: 11 MMOL/L (ref 2–11)
AST SERPL-CCNC: 22 U/L (ref 13–39)
BUN SERPL-MCNC: 66 MG/DL (ref 6–24)
BUN/CREAT SERPL: 11.3 (ref 8–20)
CALCIUM SERPL-MCNC: 8.4 MG/DL (ref 8.6–10.3)
CHLORIDE SERPL-SCNC: 96 MMOL/L (ref 101–111)
CREAT SERPL-MCNC: 0.17 MG/DL (ref 0.67–1.17)
GLOBULIN SER CALC-MCNC: 3.5 G/DL (ref 2–4)
GLUCOSE SERPL-MCNC: 93 MG/DL (ref 70–100)
HCO3 SERPL-SCNC: 24 MMOL/L (ref 22–32)
HCT VFR BLD AUTO: 31 % (ref 42–52)
HGB BLD-MCNC: 10.2 G/DL (ref 14–18)
MAGNESIUM SERPL-MCNC: 2 MG/DL (ref 1.9–2.7)
MCH RBC QN AUTO: 30 PG (ref 27–31)
MCHC RBC AUTO-ENTMCNC: 33 G/DL (ref 31–36)
MCV RBC AUTO: 90 FL (ref 80–94)
POTASSIUM SERPL-SCNC: 4 MMOL/L (ref 3.5–5)
PROT SERPL-MCNC: 6.1 G/DL (ref 6.4–8.9)
RBC # BLD AUTO: 3.41 10^6/UL (ref 4–5.4)
SODIUM SERPL-SCNC: 131 MMOL/L (ref 133–145)
WBC # BLD AUTO: 6.7 10^3/UL (ref 3.5–10.8)

## 2017-11-25 RX ADMIN — Medication SCH ML: at 05:23

## 2017-11-25 RX ADMIN — MENTHOL AND BENZOCAINE PRN LOZ: 10; 6 LOZENGE ORAL at 15:04

## 2017-11-25 RX ADMIN — ATORVASTATIN CALCIUM SCH MG: 10 TABLET, FILM COATED ORAL at 16:16

## 2017-11-25 RX ADMIN — SODIUM CITRATE AND CITRIC ACID MONOHYDRATE SCH ML: 500; 334 SOLUTION ORAL at 20:28

## 2017-11-25 RX ADMIN — ONDANSETRON HYDROCHLORIDE PRN MG: 4 TABLET, FILM COATED ORAL at 10:20

## 2017-11-25 RX ADMIN — MIDODRINE HYDROCHLORIDE SCH MG: 5 TABLET ORAL at 14:52

## 2017-11-25 RX ADMIN — Medication SCH CAP: at 10:15

## 2017-11-25 RX ADMIN — RIFAXIMIN SCH MG: 550 TABLET ORAL at 10:15

## 2017-11-25 RX ADMIN — NYSTATIN SCH APPLIC: 100000 POWDER TOPICAL at 10:15

## 2017-11-25 RX ADMIN — HEPARIN SODIUM SCH UNITS: 5000 INJECTION INTRAVENOUS; SUBCUTANEOUS at 21:02

## 2017-11-25 RX ADMIN — RIFAXIMIN SCH MG: 550 TABLET ORAL at 20:29

## 2017-11-25 RX ADMIN — MIDODRINE HYDROCHLORIDE SCH MG: 5 TABLET ORAL at 10:15

## 2017-11-25 RX ADMIN — HEPARIN SODIUM SCH UNITS: 5000 INJECTION INTRAVENOUS; SUBCUTANEOUS at 14:52

## 2017-11-25 RX ADMIN — LOPERAMIDE HYDROCHLORIDE PRN MG: 2 CAPSULE ORAL at 16:15

## 2017-11-25 RX ADMIN — Medication SCH ML: at 16:13

## 2017-11-25 RX ADMIN — HEPARIN SODIUM SCH UNITS: 5000 INJECTION INTRAVENOUS; SUBCUTANEOUS at 05:24

## 2017-11-25 RX ADMIN — SODIUM CITRATE AND CITRIC ACID MONOHYDRATE SCH ML: 500; 334 SOLUTION ORAL at 10:20

## 2017-11-25 RX ADMIN — MIDODRINE HYDROCHLORIDE SCH MG: 5 TABLET ORAL at 20:29

## 2017-11-25 RX ADMIN — SODIUM CITRATE AND CITRIC ACID MONOHYDRATE SCH ML: 500; 334 SOLUTION ORAL at 14:52

## 2017-11-25 RX ADMIN — BENZONATATE PRN MG: 100 CAPSULE ORAL at 10:20

## 2017-11-25 RX ADMIN — LOPERAMIDE HYDROCHLORIDE PRN MG: 2 CAPSULE ORAL at 10:15

## 2017-11-25 RX ADMIN — OMEPRAZOLE SCH MG: 20 CAPSULE, DELAYED RELEASE ORAL at 05:26

## 2017-11-25 RX ADMIN — NYSTATIN SCH APPLIC: 100000 POWDER TOPICAL at 20:29

## 2017-11-25 RX ADMIN — MENTHOL AND BENZOCAINE PRN LOZ: 10; 6 LOZENGE ORAL at 10:20

## 2017-11-25 RX ADMIN — NYSTATIN SCH APPLIC: 100000 POWDER TOPICAL at 14:53

## 2017-11-26 RX ADMIN — HEPARIN SODIUM SCH UNITS: 5000 INJECTION INTRAVENOUS; SUBCUTANEOUS at 05:50

## 2017-11-26 RX ADMIN — MIDODRINE HYDROCHLORIDE SCH MG: 5 TABLET ORAL at 07:50

## 2017-11-26 RX ADMIN — MENTHOL AND BENZOCAINE PRN LOZ: 10; 6 LOZENGE ORAL at 02:59

## 2017-11-26 RX ADMIN — Medication SCH CAP: at 07:50

## 2017-11-26 RX ADMIN — RIFAXIMIN SCH MG: 550 TABLET ORAL at 20:04

## 2017-11-26 RX ADMIN — SODIUM CITRATE AND CITRIC ACID MONOHYDRATE SCH ML: 500; 334 SOLUTION ORAL at 15:44

## 2017-11-26 RX ADMIN — RIFAXIMIN SCH MG: 550 TABLET ORAL at 07:50

## 2017-11-26 RX ADMIN — HEPARIN SODIUM SCH UNITS: 5000 INJECTION INTRAVENOUS; SUBCUTANEOUS at 21:13

## 2017-11-26 RX ADMIN — HEPARIN SODIUM SCH UNITS: 5000 INJECTION INTRAVENOUS; SUBCUTANEOUS at 15:44

## 2017-11-26 RX ADMIN — MIDODRINE HYDROCHLORIDE SCH MG: 5 TABLET ORAL at 15:45

## 2017-11-26 RX ADMIN — Medication SCH ML: at 17:13

## 2017-11-26 RX ADMIN — MENTHOL AND BENZOCAINE PRN LOZ: 10; 6 LOZENGE ORAL at 07:50

## 2017-11-26 RX ADMIN — MIDODRINE HYDROCHLORIDE SCH MG: 5 TABLET ORAL at 20:04

## 2017-11-26 RX ADMIN — SODIUM CITRATE AND CITRIC ACID MONOHYDRATE SCH ML: 500; 334 SOLUTION ORAL at 20:03

## 2017-11-26 RX ADMIN — MENTHOL AND BENZOCAINE PRN LOZ: 10; 6 LOZENGE ORAL at 17:12

## 2017-11-26 RX ADMIN — BENZONATATE PRN MG: 100 CAPSULE ORAL at 07:50

## 2017-11-26 RX ADMIN — ONDANSETRON HYDROCHLORIDE PRN MG: 4 TABLET, FILM COATED ORAL at 06:39

## 2017-11-26 RX ADMIN — BENZONATATE PRN MG: 100 CAPSULE ORAL at 21:13

## 2017-11-26 RX ADMIN — SODIUM CITRATE AND CITRIC ACID MONOHYDRATE SCH ML: 500; 334 SOLUTION ORAL at 07:49

## 2017-11-26 RX ADMIN — OMEPRAZOLE SCH MG: 20 CAPSULE, DELAYED RELEASE ORAL at 05:50

## 2017-11-26 RX ADMIN — NYSTATIN SCH APPLIC: 100000 POWDER TOPICAL at 15:45

## 2017-11-26 RX ADMIN — NYSTATIN SCH APPLIC: 100000 POWDER TOPICAL at 07:50

## 2017-11-26 RX ADMIN — ATORVASTATIN CALCIUM SCH MG: 10 TABLET, FILM COATED ORAL at 15:45

## 2017-11-26 RX ADMIN — Medication SCH ML: at 05:51

## 2017-11-27 PROCEDURE — 5A1D70Z PERFORMANCE OF URINARY FILTRATION, INTERMITTENT, LESS THAN 6 HOURS PER DAY: ICD-10-PCS | Performed by: INTERNAL MEDICINE

## 2017-11-27 RX ADMIN — MIDODRINE HYDROCHLORIDE SCH MG: 5 TABLET ORAL at 13:13

## 2017-11-27 RX ADMIN — RIFAXIMIN SCH MG: 550 TABLET ORAL at 08:42

## 2017-11-27 RX ADMIN — Medication SCH: at 18:56

## 2017-11-27 RX ADMIN — MENTHOL AND BENZOCAINE PRN LOZ: 10; 6 LOZENGE ORAL at 13:13

## 2017-11-27 RX ADMIN — NYSTATIN SCH APPLIC: 100000 CREAM TOPICAL at 13:12

## 2017-11-27 RX ADMIN — NYSTATIN SCH APPLIC: 100000 POWDER TOPICAL at 20:58

## 2017-11-27 RX ADMIN — MIDODRINE HYDROCHLORIDE SCH MG: 5 TABLET ORAL at 08:42

## 2017-11-27 RX ADMIN — Medication SCH ML: at 05:45

## 2017-11-27 RX ADMIN — SODIUM CITRATE AND CITRIC ACID MONOHYDRATE SCH ML: 500; 334 SOLUTION ORAL at 08:42

## 2017-11-27 RX ADMIN — NYSTATIN SCH: 100000 CREAM TOPICAL at 21:24

## 2017-11-27 RX ADMIN — HEPARIN SODIUM SCH UNITS: 5000 INJECTION INTRAVENOUS; SUBCUTANEOUS at 13:12

## 2017-11-27 RX ADMIN — Medication SCH CAP: at 08:42

## 2017-11-27 RX ADMIN — HEPARIN SODIUM SCH UNITS: 5000 INJECTION INTRAVENOUS; SUBCUTANEOUS at 21:21

## 2017-11-27 RX ADMIN — ATORVASTATIN CALCIUM SCH MG: 10 TABLET, FILM COATED ORAL at 21:01

## 2017-11-27 RX ADMIN — Medication SCH ML: at 23:16

## 2017-11-27 RX ADMIN — ACETAMINOPHEN PRN MG: 325 TABLET ORAL at 20:58

## 2017-11-27 RX ADMIN — HEPARIN SODIUM SCH UNITS: 5000 INJECTION INTRAVENOUS; SUBCUTANEOUS at 05:45

## 2017-11-27 RX ADMIN — MIDODRINE HYDROCHLORIDE SCH MG: 5 TABLET ORAL at 21:01

## 2017-11-27 RX ADMIN — OMEPRAZOLE SCH MG: 20 CAPSULE, DELAYED RELEASE ORAL at 05:44

## 2017-11-27 RX ADMIN — SODIUM CITRATE AND CITRIC ACID MONOHYDRATE SCH ML: 500; 334 SOLUTION ORAL at 21:03

## 2017-11-27 RX ADMIN — RIFAXIMIN SCH MG: 550 TABLET ORAL at 21:01

## 2017-11-27 RX ADMIN — SODIUM CITRATE AND CITRIC ACID MONOHYDRATE SCH ML: 500; 334 SOLUTION ORAL at 13:13

## 2017-11-28 ENCOUNTER — HOSPITAL ENCOUNTER (INPATIENT)
Dept: HOSPITAL 25 - MED | Age: 62
LOS: 14 days | Discharge: NURSING, REHAB OR LONG-TERM CARE | DRG: 264 | End: 2017-12-12
Attending: HOSPITALIST | Admitting: INTERNAL MEDICINE
Payer: MEDICARE

## 2017-11-28 VITALS — DIASTOLIC BLOOD PRESSURE: 48 MMHG | SYSTOLIC BLOOD PRESSURE: 96 MMHG

## 2017-11-28 DIAGNOSIS — N17.9: ICD-10-CM

## 2017-11-28 DIAGNOSIS — E66.01: ICD-10-CM

## 2017-11-28 DIAGNOSIS — Z88.1: ICD-10-CM

## 2017-11-28 DIAGNOSIS — Z99.2: ICD-10-CM

## 2017-11-28 DIAGNOSIS — I95.3: ICD-10-CM

## 2017-11-28 DIAGNOSIS — T82.7XXA: Primary | ICD-10-CM

## 2017-11-28 DIAGNOSIS — Z79.01: ICD-10-CM

## 2017-11-28 DIAGNOSIS — L03.119: ICD-10-CM

## 2017-11-28 DIAGNOSIS — E72.20: ICD-10-CM

## 2017-11-28 DIAGNOSIS — I87.8: ICD-10-CM

## 2017-11-28 DIAGNOSIS — I48.91: ICD-10-CM

## 2017-11-28 DIAGNOSIS — L29.8: ICD-10-CM

## 2017-11-28 DIAGNOSIS — Z88.2: ICD-10-CM

## 2017-11-28 DIAGNOSIS — N39.0: ICD-10-CM

## 2017-11-28 DIAGNOSIS — I08.1: ICD-10-CM

## 2017-11-28 DIAGNOSIS — A49.02: ICD-10-CM

## 2017-11-28 DIAGNOSIS — Z88.0: ICD-10-CM

## 2017-11-28 DIAGNOSIS — B96.4: ICD-10-CM

## 2017-11-28 DIAGNOSIS — I50.21: ICD-10-CM

## 2017-11-28 DIAGNOSIS — N18.6: ICD-10-CM

## 2017-11-28 LAB
BUN SERPL-MCNC: 69 MG/DL (ref 6–24)
HCT VFR BLD AUTO: 31 % (ref 42–52)
HCT VFR BLD AUTO: 33 % (ref 42–52)
HGB BLD-MCNC: 10.2 G/DL (ref 14–18)
HGB BLD-MCNC: 10.6 G/DL (ref 14–18)
MCH RBC QN AUTO: 29 PG (ref 27–31)
MCH RBC QN AUTO: 30 PG (ref 27–31)
MCHC RBC AUTO-ENTMCNC: 33 G/DL (ref 31–36)
MCHC RBC AUTO-ENTMCNC: 33 G/DL (ref 31–36)
MCV RBC AUTO: 89 FL (ref 80–94)
MCV RBC AUTO: 90 FL (ref 80–94)
RBC # BLD AUTO: 3.53 10^6/UL (ref 4–5.4)
RBC # BLD AUTO: 3.61 10^6/UL (ref 4–5.4)
WBC # BLD AUTO: 10.3 10^3/UL (ref 3.5–10.8)
WBC # BLD AUTO: 11.3 10^3/UL (ref 3.5–10.8)

## 2017-11-28 PROCEDURE — 0XP733Z REMOVAL OF INFUSION DEVICE FROM LEFT UPPER EXTREMITY, PERCUTANEOUS APPROACH: ICD-10-PCS | Performed by: SURGERY

## 2017-11-28 PROCEDURE — 87086 URINE CULTURE/COLONY COUNT: CPT

## 2017-11-28 PROCEDURE — 81015 MICROSCOPIC EXAM OF URINE: CPT

## 2017-11-28 PROCEDURE — 87040 BLOOD CULTURE FOR BACTERIA: CPT

## 2017-11-28 PROCEDURE — 71010: CPT

## 2017-11-28 PROCEDURE — 85025 COMPLETE CBC W/AUTO DIFF WBC: CPT

## 2017-11-28 PROCEDURE — 84520 ASSAY OF UREA NITROGEN: CPT

## 2017-11-28 PROCEDURE — 93306 TTE W/DOPPLER COMPLETE: CPT

## 2017-11-28 PROCEDURE — 85610 PROTHROMBIN TIME: CPT

## 2017-11-28 PROCEDURE — 80048 BASIC METABOLIC PNL TOTAL CA: CPT

## 2017-11-28 PROCEDURE — 76705 ECHO EXAM OF ABDOMEN: CPT

## 2017-11-28 PROCEDURE — G0257 UNSCHED DIALYSIS ESRD PT HOS: HCPCS

## 2017-11-28 PROCEDURE — 80202 ASSAY OF VANCOMYCIN: CPT

## 2017-11-28 PROCEDURE — 87071 CULTURE AEROBIC QUANT OTHER: CPT

## 2017-11-28 PROCEDURE — 85730 THROMBOPLASTIN TIME PARTIAL: CPT

## 2017-11-28 PROCEDURE — 87077 CULTURE AEROBIC IDENTIFY: CPT

## 2017-11-28 PROCEDURE — 81003 URINALYSIS AUTO W/O SCOPE: CPT

## 2017-11-28 PROCEDURE — 80053 COMPREHEN METABOLIC PANEL: CPT

## 2017-11-28 PROCEDURE — 0W363ZZ CONTROL BLEEDING IN NECK, PERCUTANEOUS APPROACH: ICD-10-PCS | Performed by: SURGERY

## 2017-11-28 PROCEDURE — 90935 HEMODIALYSIS ONE EVALUATION: CPT

## 2017-11-28 PROCEDURE — 87150 DNA/RNA AMPLIFIED PROBE: CPT

## 2017-11-28 PROCEDURE — 87205 SMEAR GRAM STAIN: CPT

## 2017-11-28 PROCEDURE — 36415 COLL VENOUS BLD VENIPUNCTURE: CPT

## 2017-11-28 PROCEDURE — 82565 ASSAY OF CREATININE: CPT

## 2017-11-28 PROCEDURE — 83735 ASSAY OF MAGNESIUM: CPT

## 2017-11-28 PROCEDURE — 87186 SC STD MICRODIL/AGAR DIL: CPT

## 2017-11-28 RX ADMIN — NYSTATIN SCH APPLIC: 100000 CREAM TOPICAL at 09:09

## 2017-11-28 RX ADMIN — SODIUM CITRATE AND CITRIC ACID MONOHYDRATE SCH ML: 500; 334 SOLUTION ORAL at 09:09

## 2017-11-28 RX ADMIN — SODIUM CITRATE AND CITRIC ACID MONOHYDRATE SCH ML: 500; 334 SOLUTION ORAL at 13:07

## 2017-11-28 RX ADMIN — ACETAMINOPHEN PRN MG: 325 TABLET ORAL at 13:07

## 2017-11-28 RX ADMIN — HEPARIN SODIUM SCH UNITS: 5000 INJECTION INTRAVENOUS; SUBCUTANEOUS at 21:49

## 2017-11-28 RX ADMIN — RIFAXIMIN SCH MG: 550 TABLET ORAL at 21:48

## 2017-11-28 RX ADMIN — OMEPRAZOLE SCH MG: 20 CAPSULE, DELAYED RELEASE ORAL at 05:35

## 2017-11-28 RX ADMIN — NYSTATIN SCH APPLIC: 100000 POWDER TOPICAL at 21:48

## 2017-11-28 RX ADMIN — Medication SCH CAP: at 09:09

## 2017-11-28 RX ADMIN — MIDODRINE HYDROCHLORIDE SCH MG: 5 TABLET ORAL at 13:07

## 2017-11-28 RX ADMIN — MIDODRINE HYDROCHLORIDE SCH MG: 5 TABLET ORAL at 09:09

## 2017-11-28 RX ADMIN — HEPARIN SODIUM SCH: 5000 INJECTION INTRAVENOUS; SUBCUTANEOUS at 13:07

## 2017-11-28 RX ADMIN — SODIUM CITRATE AND CITRIC ACID MONOHYDRATE SCH ML: 500; 334 SOLUTION ORAL at 21:48

## 2017-11-28 RX ADMIN — ACETAMINOPHEN PRN MG: 325 TABLET ORAL at 02:34

## 2017-11-28 RX ADMIN — ATORVASTATIN CALCIUM SCH MG: 10 TABLET, FILM COATED ORAL at 17:11

## 2017-11-28 RX ADMIN — HEPARIN SODIUM SCH UNITS: 5000 INJECTION INTRAVENOUS; SUBCUTANEOUS at 05:46

## 2017-11-28 RX ADMIN — Medication SCH ML: at 05:36

## 2017-11-28 RX ADMIN — MENTHOL AND BENZOCAINE PRN LOZ: 10; 6 LOZENGE ORAL at 03:43

## 2017-11-28 RX ADMIN — MIDODRINE HYDROCHLORIDE SCH MG: 5 TABLET ORAL at 21:48

## 2017-11-28 RX ADMIN — RIFAXIMIN SCH MG: 550 TABLET ORAL at 09:09

## 2017-11-28 RX ADMIN — NYSTATIN SCH: 100000 CREAM TOPICAL at 13:07

## 2017-11-28 NOTE — PN
Subjective


Date of Service: 11/28/17


Interval History: 





No new c/o.  States he wants to go home. 


Family History: Unchanged from Admission


Social History: Unchanged from Admission


Past Medical History: Unchanged from Admission





Objective


Active Medications: 








Acetaminophen (Tylenol Tab*)  650 mg PO Q4H PRN


   PRN Reason: PAIN


   Last Admin: 11/28/17 13:07 Dose:  650 mg


Al Hydrox/Mg Hydrox/Simethicone (Maalox Plus*)  30 ml PO Q6H PRN


   PRN Reason: gastritis


   Last Admin: 11/14/17 22:05 Dose:  30 ml


Atorvastatin Calcium (Lipitor*)  10 mg PO 1700 [facility]


   Last Admin: 11/27/17 21:01 Dose:  10 mg


Benzonatate (Tessalon Cap*)  100 mg PO BID PRN


   PRN Reason: COUGH


   Last Admin: 11/26/17 21:13 Dose:  100 mg


Citric Acid/Sodium Citrate (Bicitra*)  15 ml PO TID [facility]


   Last Admin: 11/28/17 13:07 Dose:  15 ml


Dextrose (D50w Syringe 50 Ml*)  12.5 gm IV PUSH .FOR FS < 60 - SS PRN


   PRN Reason: FS < 60


Heparin Sodium (Porcine) (Heparin Vial(*))  5,000 units SUBCUT Q8HR [facility]


   Last Admin: 11/28/17 13:07 Dose:  Not Given


Heparin Sodium (Porcine) (Heparin Flush Picc/Ml/Cvc(*))  1 ml FLUSH 0600,1800 

ALEJANDRA


   PRN Reason: Protocol


   Last Admin: 11/28/17 05:36 Dose:  1 ml


Vancomycin HCl 2,000 mg/ (Sodium Chloride)  500 mls @ 166.667 mls/hr IVPB ONCE 

ONE


   Stop: 11/28/17 17:59


Lactobacillus Rhamnosus (Culturelle*)  1 cap PO DAILY [facility]


   Last Admin: 11/28/17 09:09 Dose:  1 cap


Loperamide HCl (Imodium Cap*)  2 mg PO .SEE DIRECTIONS PRN


   PRN Reason: DIARRHEA


   Last Admin: 11/25/17 16:15 Dose:  2 mg


Midodrine (Midodrine (Nf))  5 mg PO TID [facility]LE[facility]


   PRN Reason: Protocol


   Last Admin: 11/28/17 13:07 Dose:  5 mg


Multi-Ingredient Mouthwash/Gargle (Biotene Dry Mouth Oral Rinse(Nf))  15 ml MT 

FIVE TIMES DAILY PRN


   PRN Reason: DRY MOUTH


Nystatin (Nystatin Cream*)  1 applic TOPICAL TID [facility]


   Last Admin: 11/28/17 13:07 Dose:  Not Given


Omeprazole (Prilosec Cap*)  20 mg PO DAILY@0600 [facility]


   Last Admin: 11/28/17 05:35 Dose:  20 mg


Ondansetron HCl (Zofran Tab*)  4 mg PO Q6H PRN


   PRN Reason: NAUSEA


   Last Admin: 11/26/17 06:39 Dose:  4 mg


Pharmacy Consult (Vancomycin Random Level*)  1 note FOLLOW UP 0600 [facility]


Rifaximin (Xifaxan*)  550 mg PO BID [facility]


   Last Admin: 11/28/17 09:09 Dose:  550 mg


Throat Lozenges (Chloraseptic Kimberly*)  1 kimberly PO Q4H PRN


   PRN Reason: SORE THROAT


   Last Admin: 11/28/17 03:43 Dose:  1 kimberly








 Vital Signs











  11/27/17 11/27/17 11/27/17





  15:32 20:00 21:26


 


Temperature 98.1 F  101.2 F


 


Pulse Rate 102  120


 


Respiratory 20 22 22





Rate   


 


Blood Pressure 94/49  101/51





(mmHg)   


 


O2 Sat by Pulse 95  95





Oximetry   














  11/27/17 11/28/17 11/28/17





  23:30 00:12 01:51


 


Temperature 101.6 F 101.9 F 102.0 F


 


Pulse Rate  118 110


 


Respiratory  22 24





Rate   


 


Blood Pressure  86/43 





(mmHg)   


 


O2 Sat by Pulse  93 





Oximetry   














  11/28/17 11/28/17 11/28/17





  02:06 03:45 09:16


 


Temperature  100.9 F 99.1 F


 


Pulse Rate 117  115


 


Respiratory  20 18





Rate   


 


Blood Pressure 96/42  96/48





(mmHg)   


 


O2 Sat by Pulse   94





Oximetry   











Oxygen Devices in Use Now: None


Appearance: Lethagic but responds quickly.  Looks comfortable, ? depressed.


Eyes: No Scleral Icterus


Respiratory: Symmetrical Chest Expansion and Respiratory Effort, Clear to 

Auscultation, Clear to Percussion


Cardiovascular: NL Sounds; No Murmurs; No JVD, RRR, No Edema, -


Extremities: No Edema, No Clubbing, Cyanosis, -


Skin: - - Extensive non-blanching red areas on back.  Multiple small macules on 

extremitites.  BOth loer legs bandaged.  Superficial 2 cm ulcer in sacral area. 


Neurological: Alert and Oriented x 3, NL Sensation


Result Diagrams: 


 11/28/17 00:05





 11/24/17 13:04


Microbiology and Other Data: 


 Microbiology











 11/18/17 11:31 Aerobic Blood Culture - Preliminary





 Blood Venous    No Growth Day 4





 Anaerobic Blood Culture - Preliminary





    No Growth Day 4


 


 11/20/17 20:25 Stool Gross Appearance - Final





 Stool Shiga Toxin I & II - Final





    Negative Shiga Toxin 1 & 2





 Cryptosporidium/Giardia - Final





    Neg Cryptosporidium/Giardia


 


 11/18/17 13:16 Aerobic Blood Culture - Preliminary





 Blood Venous    No Growth Day 3





 Anaerobic Blood Culture - Preliminary





    No Growth Day 3














Assess/Plan/Problems-Billing


Assessment: 





Mr. Blake is a 61 yo male with PMH significant for morbid obesity, CHF, P afib, DM

, HTN, HLD, depression and CKD who presented initially to [facility] 

after a fall and prolonged down time with sepsis secondary to both LE 

cellulitis and an UTI, mild rhabdomyolysis, acute on chronic renal failure, and 

elevated troponins who was transferred to [facility] and is not requiring ongoing HD 

and a swing patient.








- Patient Problems


(1) MRSA bacteremia


Current Visit: Yes   Status: Acute   Code(s): R78.81 - BACTEREMIA   SNOMED Code(

s): 30265687784289158


   Comment: Start vancomycin 2 gm 11/28, random level 11/29,  plan to 

administer another gram if level <15.   





(2) JOSHUA (acute kidney injury)


Current Visit: No   Status: Acute   Priority: High   Code(s): N17.9 - ACUTE 

KIDNEY FAILURE, UNSPECIFIED   SNOMED Code(s): 65369164


   Comment: Dr. Treadwell contacted 11/28 to remove HD catheter.  I spoke to Dr. Hernandez 11/28--plan on inserting new HD catheter 11/30 and giving HD 21/1.   





(3) Afib


Current Visit: No   Status: Chronic   Priority: Medium   Code(s): I48.91 - 

UNSPECIFIED ATRIAL FIBRILLATION   SNOMED Code(s): 50484314


   Comment: 


- Rate controlled, currently off medications


- Eliquis on hold d/t renal function.  


- Patient has documented allergy to warfarin.   





(4) Hyperammonemia


Current Visit: Yes   Status: Acute   Code(s): E72.20 - DISORDER OF UREA CYCLE 

METABOLISM, UNSPECIFIED   SNOMED Code(s): 8553686


   Comment: Continue rifaximin.    





(5) HTN (hypertension)


Current Visit: No   Status: Chronic   Priority: High   Code(s): I10 - ESSENTIAL 

(PRIMARY) HYPERTENSION   SNOMED Code(s): 25338606


   Comment: 


- Hypotensive, SBP  from 11/26 to 11/28.


- SBP improved with Midodrine TID


- Continue holding metoprolol.   





(6) Morbid obesity


Current Visit: Yes   Status: Acute   Code(s): E66.01 - MORBID (SEVERE) OBESITY 

DUE TO EXCESS CALORIES   SNOMED Code(s): 031239631


   Comment: BMI 44.7.   


Status and Disposition: 





Inpatient. Pt is stable for discharge to SNF vs [facility] when bed is available.

## 2017-11-28 NOTE — PN
Progress Note





- Progress Note


Date of Service: 11/28/17


Note: 





Paged for fever - Blood cx x 2 obtained.  Recommend dialysis team obtain 

culture from dialysis catheter. CXR unremarkable.  Slight elevated white count.

  Hold Abx for now.

## 2017-11-28 NOTE — RAD
INDICATION:  Fever.



COMPARISON:  Comparison is made with a prior study from November 05, 2017.



TECHNIQUE: A portable view of the chest was obtained.



FINDINGS: The heart is moderately enlarged. There is a central venous catheter entering on

the right side. The catheter tip projects in the right paratracheal region.



The lungs appear grossly clear. The left lung base is partially obscured by the enlarged

heart. No pleural effusion is seen.



IMPRESSION:  

1. CARDIOMEGALY UNCHANGED.

2. LIMITED STUDY, NO EVIDENCE FOR ACUTE FINDING.

## 2017-11-28 NOTE — PN
Progress Note





- Progress Note


Date of Service: 11/28/17


Note: 


PROCEDURE NOTE:


Asked to remove a HD catheter from a patient with MRSA in blood cultures.


The procedure/I/R/B/A were d/w the patient.  All questions answered and he 

agreed.


The site was prepped with Betadine.  The catheter exit was inspected and pus 

noted to be emanating from it.


The catheter was removed by applying gentle traction and the cuff easily 

released from the subcutaneous tunnel.  The tip was cut and given to the RN in 

a sterile container for C & S.


Direct pressure was then applied and there was ongoing venous bleeding.  A 

single 4-0 nylon suture was placed through the more proximal subcutaneous 

tunnel in order to occlude it and hemostasis was achieved.  Dressings were 

applied.


He tolerated the procedure well and there were no immediate complications.

## 2017-11-29 RX ADMIN — ATORVASTATIN CALCIUM SCH MG: 10 TABLET, FILM COATED ORAL at 17:11

## 2017-11-29 RX ADMIN — HEPARIN SODIUM SCH UNITS: 5000 INJECTION INTRAVENOUS; SUBCUTANEOUS at 05:22

## 2017-11-29 RX ADMIN — MIDODRINE HYDROCHLORIDE SCH MG: 5 TABLET ORAL at 15:07

## 2017-11-29 RX ADMIN — RIFAXIMIN SCH MG: 550 TABLET ORAL at 21:48

## 2017-11-29 RX ADMIN — HEPARIN SODIUM SCH UNITS: 5000 INJECTION INTRAVENOUS; SUBCUTANEOUS at 15:06

## 2017-11-29 RX ADMIN — Medication SCH CAP: at 09:10

## 2017-11-29 RX ADMIN — MENTHOL AND BENZOCAINE PRN LOZ: 10; 6 LOZENGE ORAL at 12:54

## 2017-11-29 RX ADMIN — Medication SCH: at 17:13

## 2017-11-29 RX ADMIN — SODIUM CITRATE AND CITRIC ACID MONOHYDRATE SCH ML: 500; 334 SOLUTION ORAL at 09:09

## 2017-11-29 RX ADMIN — NYSTATIN SCH APPLIC: 100000 POWDER TOPICAL at 09:10

## 2017-11-29 RX ADMIN — SODIUM CITRATE AND CITRIC ACID MONOHYDRATE SCH ML: 500; 334 SOLUTION ORAL at 21:58

## 2017-11-29 RX ADMIN — Medication SCH ML: at 05:22

## 2017-11-29 RX ADMIN — HEPARIN SODIUM SCH UNITS: 5000 INJECTION INTRAVENOUS; SUBCUTANEOUS at 21:58

## 2017-11-29 RX ADMIN — RIFAXIMIN SCH MG: 550 TABLET ORAL at 09:10

## 2017-11-29 RX ADMIN — OMEPRAZOLE SCH MG: 20 CAPSULE, DELAYED RELEASE ORAL at 05:19

## 2017-11-29 RX ADMIN — NYSTATIN SCH APPLIC: 100000 POWDER TOPICAL at 15:07

## 2017-11-29 RX ADMIN — Medication SCH NOTE: at 05:24

## 2017-11-29 RX ADMIN — NYSTATIN SCH APPLIC: 100000 POWDER TOPICAL at 21:54

## 2017-11-29 RX ADMIN — MENTHOL AND BENZOCAINE PRN LOZ: 10; 6 LOZENGE ORAL at 05:19

## 2017-11-29 RX ADMIN — MIDODRINE HYDROCHLORIDE SCH MG: 5 TABLET ORAL at 21:48

## 2017-11-29 RX ADMIN — MIDODRINE HYDROCHLORIDE SCH MG: 5 TABLET ORAL at 09:10

## 2017-11-29 NOTE — ECHO
Patient:      LESLEE MOREAU

Med Rec#:     [id number]            :          1955          

Date:         2017            Age:          62y                 

Account#:     [id number]          Height:       177.8 cm / 70.0 in

Accession#:   [id number]           Weight:       176.45 kg / 388.9 lbs

Sex:          M                     BSA:          2.77

Room#:        [facility]                

Admit Date#:  2017          

Type:         Inpatient

 

Referring:    Blanco Ramirez MD

Reading:      Lori Orosco MD

Sonographer:  Thea Briceno,RDCS,RDMS

CC:           Tino Loza DO

______________________________________________________________________

 

Transthoracic Echocardiogram

 

Indication:

BACTEREMIA

BP:           98/49

HR:           80

Rhythm:       A-Fib

 

Findings     

History:

CHF, AFIB, HTN, HLD, DM, CKD, morbid obesity,  

 

Technical Comments:

The study is technically limited due to patient body habitus.  The study

was technically limited due to the patient's inability to lay in the

left lateral decubitus position.  

 

Left Ventricle:

The left ventricular chamber size is normal. Mild to moderate concentric

left ventricular hypertrophy is observed. Global left ventricular wall

motion and contractility are within normal limits. The estimated

ejection fraction is 50-55%.  There is septal flattening of the

interventricular septum consistent with right ventricular volume or

pressure overload.  The assessment of diastolic function is

non-diagnostic. 

 

Left Atrium:

The left atrium is moderately dilated. 

 

Right Ventricle:

The right ventricle is moderate to severely dilated.  The right

ventricular global systolic function is mildly to moderately reduced. 

 

Right Atrium:

The right atrium is moderately dilated.  

 

Aortic Valve:

The aortic valve is trileaflet. The aortic valve leaflets are moderately

thickened.NCC sclerosis most prominent and NCC mobility diminished.

Systolic excursion of the non coronary cusp is reduced. There is aortic

annular calcification. There is no evidence of aortic regurgitation.

There is borderline aortic stenosis present. The mean gradient of the

aortic valve is 4 mmHg.  The aortic valve area, by peak velocities, is

calculated at 1.8 cm2.  There is no aortic vegetation present. 

 

Mitral Valve:

The mitral valve leaflets are mildly thickened. There is mild to

moderate mitral regurgitation.  There is no evidence of mitral stenosis.

No vegetation is observed on the mitral valve. 

 

Tricuspid Valve:

The tricuspid valve leaflets are mildly thickened.Possible prolapse seen

on image 14. There is moderate to severe tricuspid regurgitation. The

right ventricular systolic pressure is estimated at 44 mmHg.  There is

evidence that pulmonary hypertension may be underestimated. 

 

Pulmonic Valve:

The pulmonic valve appears normal. There is mild pulmonic regurgitation.

 No vegetation is observed on the pulmonic valve. 

 

Pericardium:

There is no significant pericardial effusion. 

 

Aorta:

The aortic root appears normal. There is no dilatation of the aortic

arch. 

 

Pulmonary Artery:

The main pulmonary artery appears normal. 

 

Venous:

The inferior vena cava is dilated.  There is no change in the dimension

of the inferior vena cava with respiration consistent with markedly

increased right atrial pressure. Hepatic vein systolic flow is reversed.

 

 

Conclusions

The study is technically limited due to patient body habitus. 

Mild to moderate concentric left ventricular hypertrophy is observed.

There is septal flattening of the interventricular septum consistent

with right ventricular volume or pressure overload. 

Global left ventricular wall motion and contractility are within normal

limits, EF 50-55%.

The right ventricle is moderate to severely dilated and RV systolic

function is mildly to moderately reduced.

Fair imaging of valves, no vegetations noted.

Aortic valve sclerosis..

Systolic excursion of the non coronary cusp is reduced with borderline

aortic stenosis.

There is mild to moderate mitral regurgitation. 

There is moderate to severe tricuspid regurgitation.

The right ventricular systolic pressure is estimated at 44 mmHg, PA

pressure may be underestimated.

The inferior vena cava is severely dilated. 

Compared with prior study of 10/25/17, EF prviously 40-45%, RV abnormal

on prior study, the degree of TR has increased from mild, MR has

increased from trace.

Recommendation:  if clinically indicated consider MATT for better

resolution of valves, ability to image vegetations.

 

Measurements     

Name                    Value         Normal Range            

RVIDd (AP) 2D           4.6 cm        (0.9 - 2.6)             

RVDdMajor (2D)          5.2 cm        (2.2 - 4.4)             

RAd ISD 4CH             5.4 cm        (3.4 - 4.9)             

RA (A4C)W               5.5 cm        (2.9 - 4.6)             

IVSd (2D)               1.4 cm        (0.6 - 1)               

LVPWd (2D)              1.4 cm        (0.6 - 1)               

LVIDd (2D)              4.9 cm        (3.6 - 5.4)             

LVIDs (2D)              3.6 cm        -                        

LV FS (2D)              26 %          (25 - 45)               

Aortic Annulus          2 cm          (1.4 - 2.6)             

Ao root diameter (2D)   2.7 cm        (2.1 - 3.5)             

Ascending Ao            2.6 cm        (2.1 - 3.4)             

Aortic arch             3.2 cm        (1.8 - 3.4)             

LA dimension (AP) 2D    6.3 cm        (2.3 - 3.8)             

LAd ISD 4CH             6.9 cm        (2.9 - 5.3)             

LA ISD 4CH W            5.3 cm        (2.5 - 4.5)             

 

Name                    Value         Normal Range            

LA ESV SP 4CH (A/L)     134.06 ml     -                        

LA ESV SP 2CH (A/L)     83.22 ml      -                        

LA ESV BP (A/L)         116.05 ml     -                        

LA ESV BP (A/L) index   42 ml/m2      -                        

LA ESV SP 4CH (MOD)     124.9 ml      -                        

LA ESV SP 2CH (MOD)     78.99 ml      -                        

 

Name                    Value         Normal Range            

MV E-wave Vmax          1.2 m/sec     -                        

MV deceleration time    195 msec      -                        

LV lateral e' Vmax      0.09 m/sec    -                        

LV E:e' lateral ratio   13 ratio      -                        

 

Name                    Value         Normal Range            

AV Vmax                 1.4 m/sec     -                        

AV VTI                  22.1 cm       -                        

AV peak gradient        8 mmHg        -                        

AV mean gradient        4 mmHg        -                        

LVOT diameter           2 cm          -                        

LVOT Vmax               0.8 m/sec     -                        

LVOT VTI                13.4 cm       -                        

LVOT peak gradient      2.6 mmHg      -                        

LVOT mean gradient      1.4 mmHg      -                        

FREDDY (continuity Vmax)   1.8 cm2       -                        

FREDDY (continuity VTI)    1.9 cm2       -                        

PAUL Vmax                0.7 m/sec     -                        

 

Name                    Value         Normal Range            

MV Vmax                 1.3 m/sec     -                        

MV VTI                  22.6 cm       -                        

MV peak gradient        7 mmHg        -                        

MV mean gradient        3.3 mmHg      -                        

MV PHT                  59 msec       -                        

MVA (PHT)               3.7 cm2       -                        

MVA (continuity VTI)    1.9 cm2       -                        

 

Name                    Value         Normal Range            

TR Vmax                 2.7 m/sec     -                        

TR peak gradient        29 mmHg       -                        

RAP                     15 mmHg       -                        

RVSP                    44 mmHg       -                        

IVC diameter            4.1 cm        -                        

 

Name                    Value         Normal Range            

PV Vmax                 0.9 m/sec     -                        

PV peak gradient        3.2 mmHg      -                        

 

Electronically signed by: Lori Orosco MD on 2017 16:50:50

## 2017-11-29 NOTE — CONS
CONSULTATION REPORT:

 

DATE OF CONSULT:  17

 

REQUESTING PROVIDER:  Dr. Cavanaugh.

 

CONSULTING SERVICE:  Infectious Disease.

 

REASON FOR CONSULTATION:  Sepsis, MRSA bacteremia.

 

IMPRESSION:

1.  Recent diagnosis of right lower extremity cellulitis, acute kidney injury 
requiring initiation of hemodialysis in setting of chronic kidney disease and 
transferred to skilled nursing, now back with MRSA bacteremia in the setting of 
fever.  He did recently have a hemodialysis catheter and a midline catheter 
placed, which was removed.

2.  No peripheral stigmata of infective endocarditis and no murmur.

3.  Morbid obesity.

4.  ALLERGY to KEFLEX, PENICILLIN, and SULFA.

5.  Bilateral venous stasis disease.

6.  Whole body dermatitis.

7.  Atrial fibrillation.

 

RECOMMENDATIONS:  Continue vancomycin goal trough 15 to 20.  Remove midline 
catheter.  We will recheck his blood cultures tomorrow and obtain a 
transthoracic echocardiogram, which if negative, given no peripheral stigmata 
of infective endocarditis, lack of murmur, and another source would be 
reasonable to rule out endocarditis.

 

HISTORY OF PRESENT ILLNESS:  This 62-year-old man was admitted after a fall at 
the end of October, found to have right leg cellulitis in the setting of venous 
stasis disease bilaterally.  He was treated with vancomycin and meropenem 
initially then clindamycin with resolution of cellulitis.  At that time, he was 
known to have chronic kidney disease, which was complicated by acute kidney 
injury requiring hemodialysis.  He had a right tunneled internal jugular 
catheter placed, started hemodialysis, was transferred to skilled nursing, then 
readmitted with fever. Blood cultures were drawn on the  for 4 bottles, gram
-positive cocci in clusters, PCR positive for Staph aureus and MRSA; the final 
sensitivity is pending. He was started on vancomycin.  His fevers and chills 
have resolved.  He was feeling better.  His dialysis catheter was removed last 
night.  There was pus at the exit site.  He has no prosthetic material present.
  No pain including no back or joint pain.

 

He has had a full body rash for about 3 months.  It was very itchy, scratching 
at it most of the day, worse on his arms and trunk.

 

PAST MEDICAL HISTORY:

1.  Morbid obesity.

2.  Chronic kidney disease, now on hemodialysis.

3.  Diabetes.

4.  Congestive heart failure.

5.  Atrial fibrillation.

6.  Hypertension.

7.  Hyperlipidemia.

8.  Depression.

9.  Irritable bowel syndrome.

10.  History of diverticulitis, status post partial colectomy with ostomy and 
reversal.

11.  Status post bilateral knee surgeries, no prosthetic material present.

 

MEDICATIONS:

1.  Tylenol.

2.  Lipitor.

3.  Heparin/heparin subcu injection.

4.  Lactobacillus.

5.  Midodrine.

6.  Nystatin.

7.  Omeprazole.

8.  Rifaximin.

9.  Vancomycin 1 g post dialysis.

 

ALLERGIES:  KEFLEX, PENICILLIN, XARELTO, WARFARIN, and BACTRIM.

 

FAMILY HISTORY:  Both parents are  in their 80s, healthy as far as he 
knows.

 

SOCIAL HISTORY:  Past smoker.  No alcohol, lives by himself.

 

REVIEW OF SYSTEMS:  A 14-point review of systems was negative as noted above in 
history of present illness.

 

PHYSICAL EXAM:  Vital Signs:  Temperature 37, heart rate 110, respiratory rate 
18, blood pressure 100/49, O2 sat 90% on room air.  In general, he is awake.  
Not in distress and watching TV.  Neurologic:  He is oriented x3, follows all 
commands, moves all his extremities.  Sensation is decreased to light touch in 
both feet. HEENT:  There is no conjunctival hemorrhage.  Oropharynx is without 
lesions.  Neck: Supple. There is no nuchal rigidity.  There is no lymph nodes.  
There is no inguinal, axillary, or epitrochlear lymphadenopathy.  Heart:  
Irregularly irregular and tachycardic without murmurs.  Lungs:  Clear to 
auscultation bilaterally. Abdomen:  Soft, nontender, nondistended.  There are 
bowel sounds present.  Chest: Right subclavian tunneled catheter site is 
sutured.  There is no erythema warmth or fluctuance.  Abdomen:  Soft, obese, 
nontender, and nondistended.  Skin:  There is diffuse erythematous patch over 
much of his arms, trunk, and legs with excoriation; it is blanching.  
Musculoskeletal:  There is no spine tenderness to palpation or join synovitis.  
He has bilateral lower extremity edema and venous stasis changes.

 

DIAGNOSTIC STUDIES/LAB DATA:  White blood cell count 10, hemoglobin 10, 
platelets 288.  Creatinine is 5.  Vancomycin trough 15.

 

Please see impression and recommendations as outlined above.

 

Thank you for asking me to see Mr. Blake in consultation.

 

 [id number]/CPS #: [id number]

[facility]

## 2017-11-29 NOTE — PRO
CC:  Otis Hernandez MD *

 

DATE OF PROCEDURE:  11/28/17 - ROOM #418



DATE OF BIRTH:  [birth year]



SURGEON:  Mckay Fernández MD

 

ASSISTANT:  None.

 

ANESTHESIA:  1% lidocaine plain, local.

 

PREPROCEDURE DIAGNOSIS:  Methicillin-resistant Staphylococcus aureus positive 
blood cultures.

 

POSTOPERATIVE DIAGNOSIS:  Methicillin-resistant Staphylococcus aureus positive 
blood cultures.

 

PROCEDURE:  Removal of cuffed tunneled hemodialysis catheter from right IJ site.

 

SURGEON:  Mckay Fernández MD

 

ASSISTANT:  None.

 

ANESTHESIA:  1% lidocaine plain, local.

 

ESTIMATED BLOOD LOSS:  Minimal.

 

SPECIMENS:  Catheter tip.

 

DESCRIPTION OF PROCEDURE:  This is a 62-year-old gentleman with end-stage renal 
disease with an indwelling right internal jugular hemodialysis catheter.  The 
patient has been found to have blood cultures growing MRSA, and therefore, I 
was called for removal of the catheter.

 

The procedure was discussed with the patient as were the indications, risks, 
benefits and alternatives.  The patient had an opportunity to ask questions.  
All questions were answered.  He agreed to proceed.

 

The site was prepped with Betadine and ChloraPrep.  During the prep, there was 
noted to be some pus emanating from the exit site of the catheter.  The sutures 
holding the catheter were cut and removed.  Gentle traction was applied on the 
catheter and the cuff easily advanced out of the exit site.  The catheter was 
removed intact with direct pressure applied to the right IJ site.  The catheter 
tip was cut off into a sterile container and thus delivered to the nurse.  The 
hemostasis was ultimately achieved by infiltrating local anesthetic into the 
skin and soft tissue over more proximal area of the tunnel and then using a 4-0 
nylon suture to encircle the subcutaneous tunnel and occlude it.  Dressings 
were then applied over the exit site.  The patient tolerated the procedure well 
and there were no immediate complications.

 

 [id number]/CPS #: [id number]

[facility]

## 2017-11-29 NOTE — PN
Subjective


Date of Service: 11/29/17


Interval History: 





Patient asks for something to drink.  Itchy.  No pain, SOB.  He states he wants 

to go home. 


Family History: Unchanged from Admission


Social History: Unchanged from Admission


Past Medical History: Unchanged from Admission





Objective


Active Medications: 








Acetaminophen (Tylenol Tab*)  650 mg PO Q4H PRN


   PRN Reason: PAIN


Atorvastatin Calcium (Lipitor*)  10 mg PO 1700 [facility]


   Last Admin: 11/28/17 17:11 Dose:  10 mg


Citric Acid/Sodium Citrate (Bicitra*)  15 ml PO BID [facility]


   Last Admin: 11/28/17 21:48 Dose:  15 ml


Heparin Sodium (Porcine) (Heparin Vial(*))  5,000 units SUBCUT Q8HR [facility]


   Last Admin: 11/29/17 05:22 Dose:  5,000 units


Heparin Sodium (Porcine) (Heparin Flush Picc/Ml/Cvc(*))  1 ml FLUSH 0600,1800 

[facility]


   PRN Reason: Protocol


   Last Admin: 11/29/17 05:22 Dose:  1 ml


Vancomycin HCl 1,000 mg/ (Sodium Chloride)  250 mls @ 166.667 mls/hr IVPB [facility]

@1800 [facility]


Vancomycin HCl 1,000 mg/ (Sodium Chloride)  250 mls @ 166.667 mls/hr IVPB ONCE 

ONE


   Stop: 11/29/17 09:29


Lactobacillus Rhamnosus (Culturelle*)  1 cap PO DAILY [facility]


Loperamide HCl (Imodium Cap*)  2 mg PO QID PRN


   PRN Reason: DIARRHEA


Midodrine (Midodrine (Nf))  5 mg PO TID [facility]


   PRN Reason: Protocol


   Last Admin: 11/28/17 21:48 Dose:  5 mg


Nystatin (Nystatin Top Powder*)  1 applic TOPICAL TID [facility]


   Last Admin: 11/28/17 21:48 Dose:  1 applic


Omeprazole (Prilosec Cap*)  20 mg PO 0600 [facility]


   Last Admin: 11/29/17 05:19 Dose:  20 mg


Pharmacy Consult (Vancomycin Random Level*)  1 note FOLLOW UP [facility]@0600 [facility]


   Last Admin: 11/29/17 05:24 Dose:  1 note


Rifaximin (Xifaxan*)  550 mg PO BID [facility]


   Last Admin: 11/28/17 21:48 Dose:  550 mg


Throat Lozenges (Chloraseptic Kimberly*)  1 kimberly PO Q6H PRN


   PRN Reason: SORE THROAT


   Last Admin: 11/29/17 05:19 Dose:  1 kimberly








 Vital Signs











  11/28/17 11/28/17 11/28/17





  15:13 16:00 16:32


 


Temperature  99.7 F 99.7 F


 


Pulse Rate  110 109


 


Respiratory 24 24 24





Rate   


 


Blood Pressure  83/45 83/45





(mmHg)   


 


O2 Sat by Pulse  90 93





Oximetry   














  11/28/17 11/28/17 11/28/17





  20:00 20:03 23:55


 


Temperature  99.6 F 99.8 F


 


Pulse Rate  113 115


 


Respiratory 20 20 18





Rate   


 


Blood Pressure  101/50 94/47





(mmHg)   


 


O2 Sat by Pulse  91 91





Oximetry   














  11/29/17





  03:51


 


Temperature 99.7 F


 


Pulse Rate 115


 


Respiratory 





Rate 


 


Blood Pressure 90/42





(mmHg) 


 


O2 Sat by Pulse 92





Oximetry 











Oxygen Devices in Use Now: None


Appearance: Alert, supine in bed.  In fair spiris.  Looks comfortable.


Eyes: No Scleral Icterus


Respiratory: Symmetrical Chest Expansion and Respiratory Effort, Clear to 

Auscultation, Clear to Percussion


Cardiovascular: RRR, - - distant heart sounds.  2+ pedal edema


Lymphatic: No Auricular Adenopathy


Extremities: No Clubbing, Cyanosis, - - V


Skin: No Nodules or Sclerosis, - - much of his skin is a diffuse pink rash.


Neurological: Alert and Oriented x 3, NL Sensation


Result Diagrams: 


 11/28/17 17:57





 11/28/17 17:57





Assess/Plan/Problems-Billing


Assessment: 











- Patient Problems


(1) MRSA bacteremia


Current Visit: No   Status: Acute   Code(s): R78.81 - BACTEREMIA   SNOMED Code(s

): 88631164195120784


   Comment: Received vancomycin 2 gm 11/28, plus 1 gm 11/29, should get another 

dose after HD on 12/1.  Midline to be removed when IV vanco finished 11/29.   





(2) Acute renal failure


Current Visit: No   Status: Acute   Priority: High   Comment: 


- Acute on CKD


Plan new HD catheter 11/30/17 and HD 12/1/17.


- Continue bicitra for metabolic acidosis.


BMP 11/30/17.


   





(3) CHF (congestive heart failure)


Current Visit: No   Status: Acute   Code(s): I50.9 - HEART FAILURE, UNSPECIFIED

   SNOMED Code(s): 53614720


   Comment: 


- EF 40-45 by echo 10/25/17.


- BB, ACEI not given due to hypotension.   





(4) Hyperammonemia


Current Visit: No   Status: Acute   Code(s): E72.20 - DISORDER OF UREA CYCLE 

METABOLISM, UNSPECIFIED   SNOMED Code(s): 9060102


   Comment: Continue rifaximin.    





(5) Morbid obesity


Current Visit: No   Status: Acute   Code(s): E66.01 - MORBID (SEVERE) OBESITY 

DUE TO EXCESS CALORIES   SNOMED Code(s): 550602102


   Comment: BMI 44.7.   





(6) Cellulitis


Current Visit: No   Status: Acute   Code(s): L03.90 - CELLULITIS, UNSPECIFIED   

SNOMED Code(s): 626470285


   Comment: - Patient has chronic leg wound with chronic venous stasis changes. 


- Completed first course of ABX


- Wash leg daily with soap and water. 


- Cover with gauze and ACE wraps.


Not clear if extensive rash is due to infection.

## 2017-11-29 NOTE — DCNOTE
Subjective


Date of Service: 11/28/17


Interval History: 





   


   DISCHARGE SUMMARY FOR SWING BED ADMISSION 11/17/17 TO 11/28/17:





During this swing bed admission the patient was started on midodrine per Dr. Hernandez for hypotension, with some improvement. 





He received HD every MWF.





His leg wounds were washed daily and dressed with gauze and ACE wraps. 





He was on 1200 ml daily fluid restriction.  He frequently asked for more to 

drink. 





Very late on 11/27/17 he developed a fever.  Two sets of blood C&S were sent, 

of which all 4 bottles grew MRSA by the next day.  He was given 2 gm IV 

vancomycin, and Dr. Fernández removed his HD catheter and sent the tip for C&S.  

A random vanco level was ordered for 11/29, with plans to give another 1 gm of 

vanco if the level was under 15, then remove his midline.  I spoke with Dr. Hernandez on 11/28.  The plan is to insert a new HD catheter on 11/20 and perform 

HD 12/1/17.


Family History: Unchanged from Admission


Social History: Unchanged from Admission


Past Medical History: Unchanged from Admission





Objective


Active Medications: 








Acetaminophen (Tylenol Tab*)  650 mg PO Q4H PRN


   PRN Reason: PAIN


Atorvastatin Calcium (Lipitor*)  10 mg PO 1700 [facility]


   Last Admin: 11/28/17 17:11 Dose:  10 mg


Citric Acid/Sodium Citrate (Bicitra*)  15 ml PO BID [facility]


   Last Admin: 11/28/17 21:48 Dose:  15 ml


Heparin Sodium (Porcine) (Heparin Vial(*))  5,000 units SUBCUT Q8HR [facility]


   Last Admin: 11/29/17 05:22 Dose:  5,000 units


Heparin Sodium (Porcine) (Heparin Flush Picc/Ml/Cvc(*))  1 ml FLUSH 0600,1800 

[facility]


   PRN Reason: Protocol


   Last Admin: 11/29/17 05:22 Dose:  1 ml


Vancomycin HCl 1,000 mg/ (Sodium Chloride)  250 mls @ 166.667 mls/hr IVPB [facility]

@1800 [facility]


Vancomycin HCl 1,000 mg/ (Sodium Chloride)  250 mls @ 166.667 mls/hr IVPB ONCE 

ONE


   Stop: 11/29/17 08:41


Lactobacillus Rhamnosus (Culturelle*)  1 cap PO DAILY [facility]


Loperamide HCl (Imodium Cap*)  2 mg PO QID PRN


   PRN Reason: DIARRHEA


Midodrine (Midodrine (Nf))  5 mg PO TID [facility]


   PRN Reason: Protocol


   Last Admin: 11/28/17 21:48 Dose:  5 mg


Nystatin (Nystatin Top Powder*)  1 applic TOPICAL TID [facility]


   Last Admin: 11/28/17 21:48 Dose:  1 applic


Omeprazole (Prilosec Cap*)  20 mg PO 0600 [facility]


   Last Admin: 11/29/17 05:19 Dose:  20 mg


Pharmacy Consult (Vancomycin Random Level*)  1 note FOLLOW UP [facility]@0600 [facility]


   Last Admin: 11/29/17 05:24 Dose:  1 note


Rifaximin (Xifaxan*)  550 mg PO BID [facility]


   Last Admin: 11/28/17 21:48 Dose:  550 mg


Throat Lozenges (Chloraseptic Kimberly*)  1 kimberly PO Q6H PRN


   PRN Reason: SORE THROAT


   Last Admin: 11/29/17 05:19 Dose:  1 kimberly








 Vital Signs











  11/28/17 11/28/17 11/28/17





  15:13 16:00 16:32


 


Temperature  99.7 F 99.7 F


 


Pulse Rate  110 109


 


Respiratory 24 24 24





Rate   


 


Blood Pressure  83/45 83/45





(mmHg)   


 


O2 Sat by Pulse  90 93





Oximetry   














  11/28/17 11/28/17 11/28/17





  20:00 20:03 23:55


 


Temperature  99.6 F 99.8 F


 


Pulse Rate  113 115


 


Respiratory 20 20 18





Rate   


 


Blood Pressure  101/50 94/47





(mmHg)   


 


O2 Sat by Pulse  91 91





Oximetry   














  11/29/17





  03:51


 


Temperature 99.7 F


 


Pulse Rate 115


 


Respiratory 





Rate 


 


Blood Pressure 90/42





(mmHg) 


 


O2 Sat by Pulse 92





Oximetry 











Oxygen Devices in Use Now: None


Appearance: Alert, partly  up in bed.  Neutral affect.  Looks comfortable.


Eyes: No Scleral Icterus


Neck: NL Appearance and Movements; NL JVP, No Thyroid Enlargement, Masses


Respiratory: Symmetrical Chest Expansion and Respiratory Effort, Clear to 

Auscultation, Clear to Percussion


Cardiovascular: NL Sounds; No Murmurs; No JVD, RRR, No Edema, -


Abdominal: - - massive obesity


Extremities: No Edema, No Clubbing, Cyanosis, -


Skin: No Nodules or Sclerosis, - - 2 cm superficial coccygeal skin ulcer.  

Large areas of non-blanching erythema on trunk, buttocks.  Multiple small 

macules on extremities.


Neurological: Alert and Oriented x 3, NL Sensation


Result Diagrams: 


 11/28/17 17:57





 11/28/17 17:57





Assess/Plan/Problems-Billing


Assessment: 











- Patient Problems


(1) MRSA bacteremia


Current Visit: No   Status: Acute   Code(s): R78.81 - BACTEREMIA   SNOMED Code(s

): 48580487039982290


   Comment: Start vancomycin 2 gm 11/28, random level 11/29,  plan to 

administer another gram if level <15.   





(2) Acute renal failure


Current Visit: No   Status: Acute   Priority: High   Comment: 


- Acute on CKD


Plan new HD catheter 11/30/17 and HD 12/1/17.


- Continue bicitra for metabolic acidosis.


   





(3) CHF (congestive heart failure)


Current Visit: No   Status: Acute   Code(s): I50.9 - HEART FAILURE, UNSPECIFIED

   SNOMED Code(s): 65031954


   Comment: 


- EF 40-45 by echo 10/25/17.


- BB, ACEI not given due to hypotension.   





(4) Hyperammonemia


Current Visit: No   Status: Acute   Code(s): E72.20 - DISORDER OF UREA CYCLE 

METABOLISM, UNSPECIFIED   SNOMED Code(s): 0397126


   Comment: Continue rifaximin.    





(5) Morbid obesity


Current Visit: No   Status: Acute   Code(s): E66.01 - MORBID (SEVERE) OBESITY 

DUE TO EXCESS CALORIES   SNOMED Code(s): 988788112


   Comment: BMI 44.7.

## 2017-11-30 LAB
ANION GAP SERPL CALC-SCNC: 14 MMOL/L (ref 2–11)
BUN SERPL-MCNC: 81 MG/DL (ref 6–24)
BUN/CREAT SERPL: 12.7 (ref 8–20)
CALCIUM SERPL-MCNC: 8.3 MG/DL (ref 8.6–10.3)
CHLORIDE SERPL-SCNC: 94 MMOL/L (ref 101–111)
GLUCOSE SERPL-MCNC: 76 MG/DL (ref 70–100)
HCO3 SERPL-SCNC: 22 MMOL/L (ref 22–32)
POTASSIUM SERPL-SCNC: 4.1 MMOL/L (ref 3.5–5)
SODIUM SERPL-SCNC: 130 MMOL/L (ref 133–145)

## 2017-11-30 PROCEDURE — 05HM33Z INSERTION OF INFUSION DEVICE INTO RIGHT INTERNAL JUGULAR VEIN, PERCUTANEOUS APPROACH: ICD-10-PCS | Performed by: SURGERY

## 2017-11-30 RX ADMIN — NYSTATIN SCH APPLIC: 100000 POWDER TOPICAL at 14:55

## 2017-11-30 RX ADMIN — MIDODRINE HYDROCHLORIDE SCH MG: 5 TABLET ORAL at 20:49

## 2017-11-30 RX ADMIN — OMEPRAZOLE SCH MG: 20 CAPSULE, DELAYED RELEASE ORAL at 06:17

## 2017-11-30 RX ADMIN — HEPARIN SODIUM SCH UNITS: 5000 INJECTION INTRAVENOUS; SUBCUTANEOUS at 21:22

## 2017-11-30 RX ADMIN — NYSTATIN SCH APPLIC: 100000 POWDER TOPICAL at 20:49

## 2017-11-30 RX ADMIN — RIFAXIMIN SCH MG: 550 TABLET ORAL at 20:49

## 2017-11-30 RX ADMIN — RIFAXIMIN SCH MG: 550 TABLET ORAL at 09:11

## 2017-11-30 RX ADMIN — MENTHOL AND BENZOCAINE PRN LOZ: 10; 6 LOZENGE ORAL at 17:02

## 2017-11-30 RX ADMIN — MENTHOL AND BENZOCAINE PRN LOZ: 10; 6 LOZENGE ORAL at 11:57

## 2017-11-30 RX ADMIN — ATORVASTATIN CALCIUM SCH MG: 10 TABLET, FILM COATED ORAL at 17:03

## 2017-11-30 RX ADMIN — NYSTATIN SCH APPLIC: 100000 POWDER TOPICAL at 09:11

## 2017-11-30 RX ADMIN — SODIUM CITRATE AND CITRIC ACID MONOHYDRATE SCH ML: 500; 334 SOLUTION ORAL at 09:11

## 2017-11-30 RX ADMIN — MIDODRINE HYDROCHLORIDE SCH MG: 5 TABLET ORAL at 14:55

## 2017-11-30 RX ADMIN — SODIUM CITRATE AND CITRIC ACID MONOHYDRATE SCH ML: 500; 334 SOLUTION ORAL at 20:49

## 2017-11-30 RX ADMIN — HEPARIN SODIUM SCH UNITS: 5000 INJECTION INTRAVENOUS; SUBCUTANEOUS at 06:17

## 2017-11-30 RX ADMIN — MENTHOL AND BENZOCAINE PRN LOZ: 10; 6 LOZENGE ORAL at 01:23

## 2017-11-30 RX ADMIN — Medication SCH CAP: at 09:11

## 2017-11-30 RX ADMIN — HEPARIN SODIUM SCH UNITS: 5000 INJECTION INTRAVENOUS; SUBCUTANEOUS at 14:55

## 2017-11-30 RX ADMIN — MIDODRINE HYDROCHLORIDE SCH MG: 5 TABLET ORAL at 09:11

## 2017-11-30 NOTE — PN
Subjective


Date of Service: 11/30/17


Interval History: 





No new c/o.


Family History: Unchanged from Admission


Social History: Unchanged from Admission


Past Medical History: Unchanged from Admission





Objective


Active Medications: 








Acetaminophen (Tylenol Tab*)  650 mg PO Q4H PRN


   PRN Reason: PAIN


Atorvastatin Calcium (Lipitor*)  10 mg PO 1700 [facility]


   Last Admin: 11/29/17 17:11 Dose:  10 mg


Citric Acid/Sodium Citrate (Bicitra*)  15 ml PO BID [facility]


   Last Admin: 11/30/17 09:11 Dose:  15 ml


Heparin Sodium (Porcine) (Heparin Vial(*))  5,000 units SUBCUT Q8HR [facility]


   Last Admin: 11/30/17 06:17 Dose:  5,000 units


Vancomycin HCl 1,000 mg/ (Sodium Chloride)  250 mls @ 166.667 mls/hr IVPB [facility]

@1800 [facility]


Lactobacillus Rhamnosus (Culturelle*)  1 cap PO DAILY [facility]


   Last Admin: 11/30/17 09:11 Dose:  1 cap


Loperamide HCl (Imodium Cap*)  2 mg PO QID PRN


   PRN Reason: DIARRHEA


Midodrine (Midodrine (Nf))  5 mg PO TID [facility]


   PRN Reason: Protocol


   Last Admin: 11/30/17 09:11 Dose:  5 mg


Nystatin (Nystatin Top Powder*)  1 applic TOPICAL TID [facility]


   Last Admin: 11/30/17 09:11 Dose:  1 applic


Omeprazole (Prilosec Cap*)  20 mg PO 0600 [facility]


   Last Admin: 11/30/17 06:17 Dose:  20 mg


Pharmacy Consult (Vancomycin Random Level*)  1 note FOLLOW UP [facility]@0600 [facility]


   Last Admin: 11/29/17 05:24 Dose:  1 note


Rifaximin (Xifaxan*)  550 mg PO BID [facility]


   Last Admin: 11/30/17 09:11 Dose:  550 mg


Throat Lozenges (Chloraseptic Kimberly*)  1 kimberly PO Q6H PRN


   PRN Reason: SORE THROAT


   Last Admin: 11/30/17 11:57 Dose:  1 kimberly








 Vital Signs











  11/29/17 11/29/17 11/29/17





  14:16 20:00 23:36


 


Temperature 97.5 F  98.9 F


 


Pulse Rate 102  115


 


Respiratory 20 20 16





Rate   


 


Blood Pressure 85/45  98/47





(mmHg)   


 


O2 Sat by Pulse 94  96





Oximetry   














  11/30/17





  03:16


 


Temperature 99.8 F


 


Pulse Rate 112


 


Respiratory 16





Rate 


 


Blood Pressure 108/55





(mmHg) 


 


O2 Sat by Pulse 94





Oximetry 











Oxygen Devices in Use Now: None


Appearance: Alert, supine in bed.  Somewhat irritable, otherwise looks 

comfortable.


Respiratory: Symmetrical Chest Expansion and Respiratory Effort, Clear to 

Auscultation, Clear to Percussion


Cardiovascular: NL Sounds; No Murmurs; No JVD, RRR, No Edema, -


Extremities: No Clubbing, Cyanosis, - - 1+ pedal edema BL


Skin: No Nodules or Sclerosis, - - widespread erythematous patches


Neurological: Alert and Oriented x 3, NL Sensation


Result Diagrams: 


 11/28/17 17:57





 11/30/17 08:12


Microbiology and Other Data: 


 Microbiology











 11/28/17 16:15 Catheter Tip Culture - Preliminary





 Catheter Tip    MRSA














Assess/Plan/Problems-Billing


Assessment: 











- Patient Problems


(1) MRSA bacteremia


Current Visit: No   Status: Acute   Code(s): R78.81 - BACTEREMIA   SNOMED Code(s

): 27273812483817441


   Comment: Received vancomycin 2 gm 11/28, plus 1 gm 11/29, should get another 

dose after HD on 12/1.  Midline removed 11/29.  2 sets of blood C&S ordered 11/ 30. Random vano level 12/1.   





(2) Acute renal failure


Current Visit: No   Status: Acute   Priority: High   Comment: 


- Acute on CKD


Plan new HD catheter 11/30/17 and HD 12/1/17.


- Continue bicitra for metabolic acidosis.


BMP 12/1/17.


   





(3) CHF (congestive heart failure)


Current Visit: No   Status: Acute   Code(s): I50.9 - HEART FAILURE, UNSPECIFIED

   SNOMED Code(s): 47351657


   Comment: 


- EF 40-45 by echo 10/25/17.


- BB, ACEI not given due to hypotension.   





(4) Hyperammonemia


Current Visit: No   Status: Acute   Code(s): E72.20 - DISORDER OF UREA CYCLE 

METABOLISM, UNSPECIFIED   SNOMED Code(s): 0139117


   Comment: Continue rifaximin.    





(5) Morbid obesity


Current Visit: No   Status: Acute   Code(s): E66.01 - MORBID (SEVERE) OBESITY 

DUE TO EXCESS CALORIES   SNOMED Code(s): 196202065


   Comment: BMI 44.7.   





(6) Cellulitis


Current Visit: No   Status: Acute   Code(s): L03.90 - CELLULITIS, UNSPECIFIED   

SNOMED Code(s): 727152081


   Comment: - Patient has chronic leg wound with chronic venous stasis changes. 


- Completed first course of ABX


- Wash leg daily with soap and water. 


- Cover with gauze and ACE wraps.


Not clear if extensive rash is due to infection.

## 2017-11-30 NOTE — RAD
INDICATION: Short of breath     



COMPARISON: November 27, 2017

 

TECHNIQUE: An AP portable view obtained at 1750 hours is submitted.



FINDINGS: 



Bones/Soft Tissues: There are no acute bony findings. There appears be a new dialysis

catheter terminating near the right atrial junction.



Cardiomediastinal: The cardiac silhouette is enlarged. There is mild interstitial

congestion



Lungs: There are no infiltrates. There is no pneumothorax.



Pleura: There are no pleural effusions. 



Other: None



IMPRESSION:  RIGHT-SIDED CENTRAL VENOUS CATHETER. NO PNEUMOTHORAX. ENLARGED CARDIAC

SILHOUETTE WITH MILD INTERSTITIAL CONGESTION, UNCHANGED

## 2017-11-30 NOTE — PN
Progress Note





- Progress Note


Date of Service: 11/30/17


SOAP: 


Subjective:


CC: bacteremia


HPI: 63 yo man with R chest HD catheter, midline catheter and fever; on 

vancomycin after HD for BSI.  No fever, rash or diarrhea.  No complaints.








Objective:


[]


 Vital Signs











Temp  37.7 C   11/30/17 03:16


 


Pulse  112   11/30/17 03:16


 


Resp  16   11/30/17 03:16


 


BP  108/55   11/30/17 03:16


 


Pulse Ox  94   11/30/17 03:16








 Intake & Output











 11/29/17 11/30/17 11/30/17





 18:59 06:59 18:59


 


Intake Total 980 520 570


 


Output Total  1 


 


Balance 980 519 570


 


Intake:   


 


  IVPB 260  


 


    ABX - VANCOMYCIN 260  


 


  Oral 720 520 570


 


Output:   


 


  Urine  1 


 


Other:   


 


  Estimated Void  Large 


 


  # Bowel Movements 1 1 


 


  Estimated Stool Amount Large Small 








Gen:awake no distress


HEENT:PERRL, MMM


Neck:Supple


Heart:RRR no murmur


Lungs:CTA BL


Abd:+BS NTND soft


Skin: R chest incision intact; no splinter hemorrhage


 Laboratory Results - last 24 hr











  11/30/17





  08:12


 


Sodium  130 L


 


Potassium  4.1


 


Chloride  94 L


 


Carbon Dioxide  22


 


Anion Gap  14 H


 


BUN  81 H


 


Creatinine  6.38 H


 


Est GFR ( Amer)  11.4


 


Est GFR (Non-Af Amer)  8.9


 


BUN/Creatinine Ratio  12.7


 


Glucose  76


 


Calcium  8.3 L

















Assessment:


1. MRSA bacteremia; TTE negative and no peripheral stigmata of infective 

endocarditis so it is unlikely


2. ESRD, hemodialysis


3. morbid obesity


4. diabetes with nephropathy








Plan:


1. repeat BC pending, hold on central catheters pending cleared cultures if 

possible.  Vancomycin post HD for 2 weeks from negative blood cultures.  

Discussed with Dr Cavanaugh.

## 2017-12-01 LAB
ANION GAP SERPL CALC-SCNC: 14 MMOL/L (ref 2–11)
BUN SERPL-MCNC: 90 MG/DL (ref 6–24)
BUN/CREAT SERPL: 13.3 (ref 8–20)
CALCIUM SERPL-MCNC: 8.6 MG/DL (ref 8.6–10.3)
CHLORIDE SERPL-SCNC: 94 MMOL/L (ref 101–111)
GLUCOSE SERPL-MCNC: 97 MG/DL (ref 70–100)
HCO3 SERPL-SCNC: 23 MMOL/L (ref 22–32)
HCT VFR BLD AUTO: 30 % (ref 42–52)
HGB BLD-MCNC: 9.7 G/DL (ref 14–18)
MCH RBC QN AUTO: 29 PG (ref 27–31)
MCHC RBC AUTO-ENTMCNC: 32 G/DL (ref 31–36)
MCV RBC AUTO: 89 FL (ref 80–94)
POTASSIUM SERPL-SCNC: 4.1 MMOL/L (ref 3.5–5)
RBC # BLD AUTO: 3.37 10^6/UL (ref 4–5.4)
SODIUM SERPL-SCNC: 131 MMOL/L (ref 133–145)
WBC # BLD AUTO: 7.5 10^3/UL (ref 3.5–10.8)

## 2017-12-01 PROCEDURE — 5A1D70Z PERFORMANCE OF URINARY FILTRATION, INTERMITTENT, LESS THAN 6 HOURS PER DAY: ICD-10-PCS | Performed by: INTERNAL MEDICINE

## 2017-12-01 RX ADMIN — ATORVASTATIN CALCIUM SCH MG: 10 TABLET, FILM COATED ORAL at 16:51

## 2017-12-01 RX ADMIN — NYSTATIN SCH APPLIC: 100000 POWDER TOPICAL at 14:36

## 2017-12-01 RX ADMIN — MIDODRINE HYDROCHLORIDE SCH MG: 5 TABLET ORAL at 20:55

## 2017-12-01 RX ADMIN — Medication SCH NOTE: at 09:38

## 2017-12-01 RX ADMIN — OMEPRAZOLE SCH MG: 20 CAPSULE, DELAYED RELEASE ORAL at 05:29

## 2017-12-01 RX ADMIN — HEPARIN SODIUM SCH UNITS: 5000 INJECTION INTRAVENOUS; SUBCUTANEOUS at 05:29

## 2017-12-01 RX ADMIN — RIFAXIMIN SCH MG: 550 TABLET ORAL at 08:19

## 2017-12-01 RX ADMIN — NYSTATIN SCH APPLIC: 100000 POWDER TOPICAL at 08:19

## 2017-12-01 RX ADMIN — SODIUM CITRATE AND CITRIC ACID MONOHYDRATE SCH ML: 500; 334 SOLUTION ORAL at 20:56

## 2017-12-01 RX ADMIN — NYSTATIN SCH APPLIC: 100000 POWDER TOPICAL at 22:01

## 2017-12-01 RX ADMIN — RIFAXIMIN SCH MG: 550 TABLET ORAL at 20:55

## 2017-12-01 RX ADMIN — Medication SCH CAP: at 08:18

## 2017-12-01 RX ADMIN — HEPARIN SODIUM SCH UNITS: 5000 INJECTION INTRAVENOUS; SUBCUTANEOUS at 22:04

## 2017-12-01 RX ADMIN — MIDODRINE HYDROCHLORIDE SCH MG: 5 TABLET ORAL at 08:18

## 2017-12-01 RX ADMIN — MIDODRINE HYDROCHLORIDE SCH MG: 5 TABLET ORAL at 14:35

## 2017-12-01 RX ADMIN — HEPARIN SODIUM SCH UNITS: 5000 INJECTION INTRAVENOUS; SUBCUTANEOUS at 13:56

## 2017-12-01 RX ADMIN — SODIUM CITRATE AND CITRIC ACID MONOHYDRATE SCH ML: 500; 334 SOLUTION ORAL at 08:18

## 2017-12-01 RX ADMIN — MENTHOL AND BENZOCAINE PRN LOZ: 10; 6 LOZENGE ORAL at 08:50

## 2017-12-01 NOTE — PN
Cardiology Progress Note





12/1/2017:


I discussed risks and benefits of MATT with patient.  I think the benefits far 

outweigh the risks and discussed this and recommended we proceed with MATT to 

evaluate for endocarditis.  Patient declined.  Dr. Cavanaugh notified.





Attn coders: Please do not bill for this encounter

## 2017-12-01 NOTE — ADMNOTE
Subjective


Date of Service: 12/01/17


Interval History: 





Addendum to today's note.


Family History: Unchanged from Admission


Social History: Unchanged from Admission


Past Medical History: Unchanged from Admission





Review of Systems





- Measurements


Intake and Output: 


Intake and Output Last 24 Hours











 11/29/17 11/30/17 12/01/17 12/02/17





 06:59 06:59 06:59 06:59


 


Intake Total 1370 1500 1225 0


 


Output Total  1  


 


Balance 1370 1499 1225 0


 


Weight 339 lb  346 lb 6.4 oz 


 


Intake:    


 


  IV Fluids 550  0 


 


    ABX - VANCOMYCIN 550  0 


 


  IVPB  260  


 


    ABX - VANCOMYCIN  260  


 


  Oral 820 1240 1225 0


 


Output:    


 


  Urine  1  


 


Other:    


 


  Estimated Void  Large  


 


  # Bowel Movements 1 1 1 


 


  Estimated Stool Amount Medium Small Small 


 


  # Voids 1   














Objective


Active Medications: 








Acetaminophen (Tylenol Tab*)  650 mg PO Q4H PRN


   PRN Reason: PAIN


Atorvastatin Calcium (Lipitor*)  10 mg PO 1700 [facility]


   Last Admin: 11/30/17 17:03 Dose:  10 mg


Citric Acid/Sodium Citrate (Bicitra*)  15 ml PO BID [facility]


   Last Admin: 12/01/17 08:18 Dose:  15 ml


Heparin Sodium (Porcine) (Heparin Vial(*))  5,000 units SUBCUT Q8HR [facility]


   Last Admin: 12/01/17 05:29 Dose:  5,000 units


Vancomycin HCl 1,500 mg/ (Sodium Chloride)  250 mls @ 166.667 mls/hr IVPB [facility]

@1200 [facility]


   PRN Reason: Protocol


Lactobacillus Rhamnosus (Culturelle*)  1 cap PO DAILY [facility]


   Last Admin: 12/01/17 08:18 Dose:  1 cap


Loperamide HCl (Imodium Cap*)  2 mg PO QID PRN


   PRN Reason: DIARRHEA


Midodrine (Midodrine (Nf))  5 mg PO TID [facility]


   PRN Reason: Protocol


   Last Admin: 12/01/17 08:18 Dose:  5 mg


Nystatin (Nystatin Top Powder*)  1 applic TOPICAL TID [facility]


   Last Admin: 12/01/17 08:19 Dose:  1 applic


Omeprazole (Prilosec Cap*)  20 mg PO 0600 [facility]


   Last Admin: 12/01/17 05:29 Dose:  20 mg


Pharmacy Consult (Vancomycin Random Level*)  1 note FOLLOW UP [facility]@0600 [facility]


   Last Admin: 12/01/17 09:38 Dose:  1 note


Rifaximin (Xifaxan*)  550 mg PO BID [facility]


   Last Admin: 12/01/17 08:19 Dose:  550 mg


Throat Lozenges (Chloraseptic Kimberly*)  1 kimberly PO Q6H PRN


   PRN Reason: SORE THROAT


   Last Admin: 12/01/17 08:50 Dose:  1 kimberly








 Vital Signs











  11/30/17 11/30/17 12/01/17





  17:55 20:00 03:57


 


Temperature 96.9 F  98.5 F


 


Pulse Rate 112  109


 


Respiratory 22 17 20





Rate   


 


Blood Pressure 96/55  101/56





(mmHg)   


 


O2 Sat by Pulse 97  96





Oximetry   














  12/01/17 12/01/17





  07:47 08:00


 


Temperature 98.1 F 


 


Pulse Rate 107 


 


Respiratory 20 16





Rate  


 


Blood Pressure 102/46 





(mmHg)  


 


O2 Sat by Pulse 97 





Oximetry  











Oxygen Devices in Use Now: None


Result Diagrams: 


 12/01/17 06:20





 12/01/17 06:20


Microbiology and Other Data: 


 Microbiology











 11/28/17 16:15 Catheter Tip Culture - Preliminary





 Catheter Tip    MRSA














Assess/Plan/Problems-Billing


Assessment: 











- Patient Problems


(1) MRSA bacteremia


Current Visit: No   Status: Acute   Code(s): R78.81 - BACTEREMIA   SNOMED Code(s

): 02588621703296057


   Comment: MRSA bactermia is secondary to dialysis catheter infection.  The 

patient received vancomycin 2 gm 11/28, plus 1 gm 11/29, will get another 1.5 

gm after HD on 12/1.  Midline removed 11/29.  2 sets of blood C&S 11/30 all 

growing MRSA. Random vano level 12/1 was 15.9.  Dialysis cath to be removed 12/ 1 after vanco infused, tip to be C&S.  Two sets of blood C&S 12/2.  MATT 12/1.  

  





(2) Acute renal failure


Current Visit: No   Status: Acute   Priority: High   Comment: 


- Acute on CKD


Plan new HD catheter 11/30/17 and HD 12/1/17.


- Continue bicitra for metabolic acidosis.


BMP 12/1/17.


   





(3) CHF (congestive heart failure)


Current Visit: No   Status: Acute   Code(s): I50.9 - HEART FAILURE, UNSPECIFIED

   SNOMED Code(s): 44526278


   Comment: 


- EF 40-45 by echo 10/25/17.


- BB, ACEI not given due to hypotension.   





(4) Hyperammonemia


Current Visit: No   Status: Acute   Code(s): E72.20 - DISORDER OF UREA CYCLE 

METABOLISM, UNSPECIFIED   SNOMED Code(s): 9580682


   Comment: Continue rifaximin.    





(5) Morbid obesity


Current Visit: No   Status: Acute   Code(s): E66.01 - MORBID (SEVERE) OBESITY 

DUE TO EXCESS CALORIES   SNOMED Code(s): 351435257


   Comment: BMI 44.7.   





(6) Cellulitis


Current Visit: No   Status: Acute   Code(s): L03.90 - CELLULITIS, UNSPECIFIED   

SNOMED Code(s): 761254882


   Comment: - Patient has chronic leg wound with chronic venous stasis changes. 


- Completed first course of ABX


- Wash leg daily with soap and water. 


- Cover with gauze and ACE wraps.


Not clear if extensive rash is due to infection.   





(7) ESRD (end stage renal disease)


Current Visit: Yes   Status: Acute   Code(s): N18.6 - END STAGE RENAL DISEASE   

SNOMED Code(s): 56876656


   Comment: HD [facility] for ESRD.

## 2017-12-01 NOTE — PN
Subjective


Date of Service: 12/01/17


Interval History: 





No new c/o.


Family History: Unchanged from Admission


Social History: Unchanged from Admission


Past Medical History: Unchanged from Admission





Objective


Active Medications: 








Acetaminophen (Tylenol Tab*)  650 mg PO Q4H PRN


   PRN Reason: PAIN


Atorvastatin Calcium (Lipitor*)  10 mg PO 1700 [facility]


   Last Admin: 11/30/17 17:03 Dose:  10 mg


Citric Acid/Sodium Citrate (Bicitra*)  15 ml PO BID [facility]


   Last Admin: 12/01/17 08:18 Dose:  15 ml


Heparin Sodium (Porcine) (Heparin Vial(*))  5,000 units SUBCUT Q8HR [facility]


   Last Admin: 12/01/17 05:29 Dose:  5,000 units


Vancomycin HCl 1,500 mg/ (Sodium Chloride)  250 mls @ 166.667 mls/hr IVPB [facility]

@1200 [facility]


   PRN Reason: Protocol


Lactobacillus Rhamnosus (Culturelle*)  1 cap PO DAILY [facility]


   Last Admin: 12/01/17 08:18 Dose:  1 cap


Loperamide HCl (Imodium Cap*)  2 mg PO QID PRN


   PRN Reason: DIARRHEA


Midodrine (Midodrine (Nf))  5 mg PO TID [facility]


   PRN Reason: Protocol


   Last Admin: 12/01/17 08:18 Dose:  5 mg


Nystatin (Nystatin Top Powder*)  1 applic TOPICAL TID [facility]


   Last Admin: 12/01/17 08:19 Dose:  1 applic


Omeprazole (Prilosec Cap*)  20 mg PO 0600 [facility]


   Last Admin: 12/01/17 05:29 Dose:  20 mg


Pharmacy Consult (Vancomycin Random Level*)  1 note FOLLOW UP [facility]@0600 [facility]


   Last Admin: 12/01/17 09:38 Dose:  1 note


Rifaximin (Xifaxan*)  550 mg PO BID [facility]


   Last Admin: 12/01/17 08:19 Dose:  550 mg


Throat Lozenges (Chloraseptic Kimberly*)  1 kimberly PO Q6H PRN


   PRN Reason: SORE THROAT


   Last Admin: 12/01/17 08:50 Dose:  1 kimberly








 Vital Signs











  11/30/17 11/30/17 12/01/17





  17:55 20:00 03:57


 


Temperature 96.9 F  98.5 F


 


Pulse Rate 112  109


 


Respiratory 22 17 20





Rate   


 


Blood Pressure 96/55  101/56





(mmHg)   


 


O2 Sat by Pulse 97  96





Oximetry   














  12/01/17 12/01/17





  07:47 08:00


 


Temperature 98.1 F 


 


Pulse Rate 107 


 


Respiratory 20 16





Rate  


 


Blood Pressure 102/46 





(mmHg)  


 


O2 Sat by Pulse 97 





Oximetry  











Oxygen Devices in Use Now: None


Appearance: Alert, supine in bed.  In fair spirits.  Looks comfortable.


Eyes: No Scleral Icterus


Respiratory: Symmetrical Chest Expansion and Respiratory Effort, Clear to 

Auscultation, Clear to Percussion, - - Clear anteriorly


Cardiovascular: NL Sounds; No Murmurs; No JVD, RRR, No Edema, -


Extremities: No Edema, No Clubbing, Cyanosis, -


Skin: No Nodules or Sclerosis, - - many red areas and many excoriations.  

Perineal area not examined


Neurological: Alert and Oriented x 3, NL Sensation


Result Diagrams: 


 12/01/17 06:20





 12/01/17 06:20


Microbiology and Other Data: 


 Microbiology











 11/28/17 16:15 Catheter Tip Culture - Preliminary





 Catheter Tip    MRSA














Assess/Plan/Problems-Billing


Assessment: 











- Patient Problems


(1) MRSA bacteremia


Current Visit: No   Status: Acute   Code(s): R78.81 - BACTEREMIA   SNOMED Code(s

): 26965334433911145


   Comment: Received vancomycin 2 gm 11/28, plus 1 gm 11/29, will get another 

1.5 gm after HD on 12/1.  Midline removed 11/29.  2 sets of blood C&S 11/30 all 

growing MRSA. Random vano level 12/1 was 15.9.  Dialysis cath to be removed 12/ 1 after vanco infused, tip to be C&S.  Two sets of blood C&S 12/2.  MATT 12/1.  

  





(2) Acute renal failure


Current Visit: No   Status: Acute   Priority: High   Comment: 


- Acute on CKD


Plan new HD catheter 11/30/17 and HD 12/1/17.


- Continue bicitra for metabolic acidosis.


BMP 12/1/17.


   





(3) CHF (congestive heart failure)


Current Visit: No   Status: Acute   Code(s): I50.9 - HEART FAILURE, UNSPECIFIED

   SNOMED Code(s): 30780744


   Comment: 


- EF 40-45 by echo 10/25/17.


- BB, ACEI not given due to hypotension.   





(4) Hyperammonemia


Current Visit: No   Status: Acute   Code(s): E72.20 - DISORDER OF UREA CYCLE 

METABOLISM, UNSPECIFIED   SNOMED Code(s): 3521001


   Comment: Continue rifaximin.    





(5) Morbid obesity


Current Visit: No   Status: Acute   Code(s): E66.01 - MORBID (SEVERE) OBESITY 

DUE TO EXCESS CALORIES   SNOMED Code(s): 113853704


   Comment: BMI 44.7.   





(6) Cellulitis


Current Visit: No   Status: Acute   Code(s): L03.90 - CELLULITIS, UNSPECIFIED   

SNOMED Code(s): 403368977


   Comment: - Patient has chronic leg wound with chronic venous stasis changes. 


- Completed first course of ABX


- Wash leg daily with soap and water. 


- Cover with gauze and ACE wraps.


Not clear if extensive rash is due to infection.

## 2017-12-01 NOTE — OP
CC:  Dr. Treadwell; Dr. Hernandez

 

OPERATIVE REPORT:

 

DATE OF OPERATION:  11/30/17

 

DATE OF BIRTH:  [birth year]

 

SURGEON:  Luis Treadwell MD

 

ASSISTANT:  None.

 

ANESTHESIOLOGIST:  None.

 

PRE-OP DIAGNOSIS:  Renal failure.

 

POST-OP DIAGNOSIS:  Renal failure.

 

OPERATIVE PROCEDURE:  Placement of right internal jugular temporary hemodialysis catheter.

 

DESCRIPTION OF PROCEDURE:  The patient was supine in bed.  The sterile gown and gloves, hat and mask 
were utilized.  A sterile field was created.  The right neck and clavicular region were prepped with 
ChloraPrep and draped in a sterile fashion. Local infiltrative anesthesia was administered in the rig
ht neck and skinny  needle used to identify the internal jugular vein.  A larger needle was th
en utilized and the guidewire was passed very easily and the dilators were utilized and then the curv
ed 20 cm catheter was placed, was place to about 1 to 2 cm from the hub, sutured with nylon suture.  
Sterile dressing was placed.  There was good blood return.  It was flushed with saline solution and h
eparinized solution followed by a sterile dressing.  He tolerated the procedure well.  Follow up ches
t x-ray revealed catheter in excellent position and no pneumothorax.  There were no complications.  N
o drains.  Blood loss was may be 30 mL.

 

 [id number]/[facility] #: [id number]

## 2017-12-02 RX ADMIN — NYSTATIN SCH APPLIC: 100000 POWDER TOPICAL at 14:07

## 2017-12-02 RX ADMIN — HEPARIN SODIUM SCH: 5000 INJECTION INTRAVENOUS; SUBCUTANEOUS at 05:40

## 2017-12-02 RX ADMIN — MIDODRINE HYDROCHLORIDE SCH MG: 5 TABLET ORAL at 14:06

## 2017-12-02 RX ADMIN — MIDODRINE HYDROCHLORIDE SCH MG: 5 TABLET ORAL at 09:20

## 2017-12-02 RX ADMIN — SODIUM CITRATE AND CITRIC ACID MONOHYDRATE SCH ML: 500; 334 SOLUTION ORAL at 09:20

## 2017-12-02 RX ADMIN — HEPARIN SODIUM SCH: 5000 INJECTION INTRAVENOUS; SUBCUTANEOUS at 14:07

## 2017-12-02 RX ADMIN — NYSTATIN SCH APPLIC: 100000 POWDER TOPICAL at 09:20

## 2017-12-02 RX ADMIN — OMEPRAZOLE SCH MG: 20 CAPSULE, DELAYED RELEASE ORAL at 05:41

## 2017-12-02 RX ADMIN — MIDODRINE HYDROCHLORIDE SCH MG: 5 TABLET ORAL at 21:20

## 2017-12-02 RX ADMIN — Medication SCH CAP: at 09:20

## 2017-12-02 RX ADMIN — ATORVASTATIN CALCIUM SCH MG: 10 TABLET, FILM COATED ORAL at 16:14

## 2017-12-02 RX ADMIN — RIFAXIMIN SCH MG: 550 TABLET ORAL at 09:20

## 2017-12-02 RX ADMIN — SODIUM CITRATE AND CITRIC ACID MONOHYDRATE SCH ML: 500; 334 SOLUTION ORAL at 21:20

## 2017-12-02 RX ADMIN — RIFAXIMIN SCH MG: 550 TABLET ORAL at 21:20

## 2017-12-02 RX ADMIN — NYSTATIN SCH APPLIC: 100000 POWDER TOPICAL at 21:52

## 2017-12-02 RX ADMIN — HEPARIN SODIUM SCH: 5000 INJECTION INTRAVENOUS; SUBCUTANEOUS at 21:22

## 2017-12-02 RX ADMIN — HEPARIN SODIUM SCH UNITS: 5000 INJECTION INTRAVENOUS; SUBCUTANEOUS at 21:20

## 2017-12-02 NOTE — PN
Progress Note





- Progress Note


Date of Service: 12/02/17


Note: 





Dy #2 s/p insertion of RIJ HD catheter.





D/W Dr Cavanaugh.  There is some risk to removing and replacing HD catheter for 

every dialysis.





The original catheter had erika pus coming out of the track, which I believe 

could explain his continued bacteremia.  It is not clear that the current 

catheter is infected.  Will hold off on removal until we have results of today'

s blood culture.

## 2017-12-02 NOTE — PN
Subjective


Date of Service: 12/02/17


Interval History: 





No pain, SOB.  No new c/o.  Patient brought up the subject of MATT, wanted to 

know what it might show.  He still does not want to have it done. 


Family History: Unchanged from Admission


Social History: Unchanged from Admission


Past Medical History: Unchanged from Admission





Objective


Active Medications: 








Acetaminophen (Tylenol Tab*)  650 mg PO Q4H PRN


   PRN Reason: PAIN


Atorvastatin Calcium (Lipitor*)  10 mg PO 1700 [facility]


   Last Admin: 12/01/17 16:51 Dose:  10 mg


Citric Acid/Sodium Citrate (Bicitra*)  15 ml PO BID [facility]


   Last Admin: 12/02/17 09:20 Dose:  15 ml


Heparin Sodium (Porcine) (Heparin Vial(*))  5,000 units SUBCUT Q8HR [facility]


   Last Admin: 12/02/17 05:40 Dose:  Not Given


Vancomycin HCl 1,500 mg/ (Sodium Chloride)  250 mls @ 166.667 mls/hr IVPB [facility]

@1200 [facility]


   PRN Reason: Protocol


   Last Admin: 12/01/17 13:57 Dose:  166.667 mls/hr


Lactobacillus Rhamnosus (Culturelle*)  1 cap PO DAILY [facility]


   Last Admin: 12/02/17 09:20 Dose:  1 cap


Loperamide HCl (Imodium Cap*)  2 mg PO QID PRN


   PRN Reason: DIARRHEA


Midodrine (Midodrine (Nf))  10 mg PO TID [facility]


   PRN Reason: Protocol


   Last Admin: 12/02/17 09:20 Dose:  10 mg


Nystatin (Nystatin Top Powder*)  1 applic TOPICAL TID [facility]


   Last Admin: 12/02/17 09:20 Dose:  1 applic


Omeprazole (Prilosec Cap*)  20 mg PO 0600 [facility]


   Last Admin: 12/02/17 05:41 Dose:  20 mg


Pharmacy Consult (Vancomycin Random Level*)  1 note FOLLOW UP [facility]@0600 [facility]


   Last Admin: 12/01/17 09:38 Dose:  1 note


Rifaximin (Xifaxan*)  550 mg PO BID [facility]


   Last Admin: 12/02/17 09:20 Dose:  550 mg


Throat Lozenges (Chloraseptic Kimberly*)  1 kimberly PO Q6H PRN


   PRN Reason: SORE THROAT


   Last Admin: 12/01/17 08:50 Dose:  1 kimberly








 Vital Signs











  12/01/17 12/01/17 12/01/17





  11:43 15:46 20:00


 


Temperature 97.8 F  


 


Pulse Rate 95 98 


 


Respiratory 20 16 20





Rate   


 


Blood Pressure 101/48 103/57 





(mmHg)   


 


O2 Sat by Pulse 96 95 





Oximetry   














  12/01/17 12/02/17 12/02/17





  23:41 03:48 08:01


 


Temperature 98.3 F 98.6 F 98.3 F


 


Pulse Rate 113 114 104


 


Respiratory 16 16 20





Rate   


 


Blood Pressure 96/52 103/56 120/62





(mmHg)   


 


O2 Sat by Pulse 96 96 97





Oximetry   














  12/02/17





  09:43


 


Temperature 


 


Pulse Rate 


 


Respiratory 16





Rate 


 


Blood Pressure 





(mmHg) 


 


O2 Sat by Pulse 





Oximetry 











Oxygen Devices in Use Now: None - Alert, supine in bed.  In fair spirits.  

Looks comfortable. 


Respiratory: Symmetrical Chest Expansion and Respiratory Effort, Clear to 

Auscultation, Clear to Percussion, - - clear anteiorly


Cardiovascular: NL Sounds; No Murmurs; No JVD, RRR, No Edema, -


Extremities: No Clubbing, Cyanosis, - - 1+ edema BL


Skin: No Nodules or Sclerosis, - - Both lower legs bandaged


Neurological: Alert and Oriented x 3, NL Sensation


Result Diagrams: 


 12/01/17 06:20





 12/01/17 06:20


Microbiology and Other Data: 


 Microbiology











 11/28/17 16:15 Catheter Tip Culture - Preliminary





 Catheter Tip    MRSA














Assess/Plan/Problems-Billing


Assessment: 











- Patient Problems


(1) MRSA bacteremia


Current Visit: No   Status: Acute   Code(s): R78.81 - BACTEREMIA   SNOMED Code(s

): 50940721791940686


   Comment: MRSA bactermia is secondary to dialysis catheter infection.  The 

patient received vancomycin 2 gm 11/28, plus 1 gm 11/29, will get another 1.5 

gm after HD on 12/1.  Midline removed 11/29.  2 sets of blood C&S 11/30 all 

growing MRSA. Random vano level 12/1 was 15.9, on 12.2 was 21.2.  Two sets of 

blood C&S 12/2.  Pt refuses MATT.  I discussed question of removing dialysis 

catheter with Dr. Treadwell.  There are significant risks with multiple insertions 

of dialysis catheters.  We will wait on results of blood C&S from 12/2.   





(2) Acute renal failure


Current Visit: No   Status: Acute   Priority: High   Comment: ESRD


- Acute on CKD


Plan new HD 12/4/17.


- Continue bicitra for metabolic acidosis.


   





(3) CHF (congestive heart failure)


Current Visit: No   Status: Acute   Code(s): I50.9 - HEART FAILURE, UNSPECIFIED

   SNOMED Code(s): 74182890


   Comment: 


- EF 40-45 by echo 10/25/17.


- BB, ACEI not given due to hypotension.   





(4) Hyperammonemia


Current Visit: No   Status: Acute   Code(s): E72.20 - DISORDER OF UREA CYCLE 

METABOLISM, UNSPECIFIED   SNOMED Code(s): 3814604


   Comment: Continue rifaximin.    





(5) Morbid obesity


Current Visit: No   Status: Acute   Code(s): E66.01 - MORBID (SEVERE) OBESITY 

DUE TO EXCESS CALORIES   SNOMED Code(s): 336106296


   Comment: BMI 44.7.   





(6) Cellulitis


Current Visit: No   Status: Acute   Code(s): L03.90 - CELLULITIS, UNSPECIFIED   

SNOMED Code(s): 356988571


   Comment: - Patient has chronic leg wound with chronic venous stasis changes. 


- Completed first course of ABX


- Wash leg daily with soap and water. 


- Cover with gauze and ACE wraps.


Not clear if extensive rash is due to infection.   





(7) ESRD (end stage renal disease)


Current Visit: Yes   Status: Acute   Code(s): N18.6 - END STAGE RENAL DISEASE   

SNOMED Code(s): 84206007


   Comment: HD [facility] for ESRD.

## 2017-12-03 RX ADMIN — RIFAXIMIN SCH MG: 550 TABLET ORAL at 22:43

## 2017-12-03 RX ADMIN — NYSTATIN SCH: 100000 POWDER TOPICAL at 14:10

## 2017-12-03 RX ADMIN — HEPARIN SODIUM SCH: 5000 INJECTION INTRAVENOUS; SUBCUTANEOUS at 14:10

## 2017-12-03 RX ADMIN — NYSTATIN SCH APPLIC: 100000 POWDER TOPICAL at 22:44

## 2017-12-03 RX ADMIN — HEPARIN SODIUM SCH: 5000 INJECTION INTRAVENOUS; SUBCUTANEOUS at 22:44

## 2017-12-03 RX ADMIN — SODIUM CITRATE AND CITRIC ACID MONOHYDRATE SCH ML: 500; 334 SOLUTION ORAL at 09:39

## 2017-12-03 RX ADMIN — HEPARIN SODIUM SCH: 5000 INJECTION INTRAVENOUS; SUBCUTANEOUS at 07:12

## 2017-12-03 RX ADMIN — SODIUM CITRATE AND CITRIC ACID MONOHYDRATE SCH ML: 500; 334 SOLUTION ORAL at 22:43

## 2017-12-03 RX ADMIN — MIDODRINE HYDROCHLORIDE SCH MG: 5 TABLET ORAL at 09:39

## 2017-12-03 RX ADMIN — NYSTATIN SCH APPLIC: 100000 POWDER TOPICAL at 09:40

## 2017-12-03 RX ADMIN — OMEPRAZOLE SCH MG: 20 CAPSULE, DELAYED RELEASE ORAL at 05:27

## 2017-12-03 RX ADMIN — MIDODRINE HYDROCHLORIDE SCH MG: 5 TABLET ORAL at 22:43

## 2017-12-03 RX ADMIN — Medication SCH CAP: at 09:39

## 2017-12-03 RX ADMIN — MIDODRINE HYDROCHLORIDE SCH MG: 5 TABLET ORAL at 15:58

## 2017-12-03 RX ADMIN — ATORVASTATIN CALCIUM SCH MG: 10 TABLET, FILM COATED ORAL at 15:58

## 2017-12-03 RX ADMIN — RIFAXIMIN SCH MG: 550 TABLET ORAL at 09:39

## 2017-12-03 RX ADMIN — MENTHOL AND BENZOCAINE PRN LOZ: 10; 6 LOZENGE ORAL at 23:05

## 2017-12-03 NOTE — PN
Subjective


Date of Service: 12/03/17


Interval History: 





No new c/o.


Family History: Unchanged from Admission


Social History: Unchanged from Admission


Past Medical History: Unchanged from Admission





Objective


Active Medications: 








Acetaminophen (Tylenol Tab*)  650 mg PO Q4H PRN


   PRN Reason: PAIN


Atorvastatin Calcium (Lipitor*)  10 mg PO 1700 [facility]


   Last Admin: 12/03/17 15:58 Dose:  10 mg


Citric Acid/Sodium Citrate (Bicitra*)  15 ml PO BID [facility]


   Last Admin: 12/03/17 09:39 Dose:  15 ml


Heparin Sodium (Porcine) (Heparin Vial(*))  5,000 units SUBCUT Q12HR [facility]


Vancomycin HCl 1,500 mg/ (Sodium Chloride)  500 mls @ 250 mls/hr IVPB [facility]@

1600 PRN


   PRN Reason: PER RANDOM VANCOMYCIN LEVEL


Lactobacillus Rhamnosus (Culturelle*)  1 cap PO DAILY [facility]


   Last Admin: 12/03/17 09:39 Dose:  1 cap


Loperamide HCl (Imodium Cap*)  2 mg PO QID PRN


   PRN Reason: DIARRHEA


Midodrine (Midodrine (Nf))  10 mg PO TID [facility]


   PRN Reason: Protocol


   Last Admin: 12/03/17 15:58 Dose:  10 mg


Nystatin (Nystatin Top Powder*)  1 applic TOPICAL TID [facility]


   Last Admin: 12/03/17 14:10 Dose:  Not Given


Omeprazole (Prilosec Cap*)  20 mg PO 0600 [facility]


   Last Admin: 12/03/17 05:27 Dose:  20 mg


Pharmacy Consult (Vancomycin Random Level*)  1 note FOLLOW UP [facility]@0600 [facility]


   Last Admin: 12/01/17 09:38 Dose:  1 note


Pharmacy Profile Note (Vancomycin Trough Check)  1 note FOLLOW UP [facility]@0600 

[facility]


Rifaximin (Xifaxan*)  550 mg PO BID [facility]


   Last Admin: 12/03/17 09:39 Dose:  550 mg


Throat Lozenges (Chloraseptic Kimberly*)  1 kimberly PO Q6H PRN


   PRN Reason: SORE THROAT


   Last Admin: 12/01/17 08:50 Dose:  1 kimberly








 Vital Signs











  12/02/17 12/02/17 12/02/17





  20:00 20:23 23:23


 


Temperature  97.4 F 98.2 F


 


Pulse Rate  94 94


 


Respiratory 20 20 19





Rate   


 


Blood Pressure  114/63 107/57





(mmHg)   


 


O2 Sat by Pulse  97 97





Oximetry   














  12/03/17 12/03/17 12/03/17





  03:55 08:24 11:54


 


Temperature 97.8 F 97.9 F 


 


Pulse Rate 97 93 


 


Respiratory 18 20 18





Rate   


 


Blood Pressure 97/59 96/53 





(mmHg)   


 


O2 Sat by Pulse 97 97 





Oximetry   











Oxygen Devices in Use Now: None


Appearance: Alert, supine on bariatric bed.  In fair spirits.  Looks 

comfortable.


Eyes: No Scleral Icterus


Respiratory: Symmetrical Chest Expansion and Respiratory Effort, Clear to 

Auscultation, Clear to Percussion, - - clear anteriorly


Cardiovascular: - - 1+ edema BL.  Distant heart sounds


Skin: No Nodules or Sclerosis, - - Both lower legs bandaged.  Multiple patchy 

red area all extremities and trunk


Neurological: Alert and Oriented x 3, NL Sensation


Result Diagrams: 


 12/01/17 06:20





 12/01/17 06:20


Microbiology and Other Data: 


 Microbiology











 11/28/17 16:15 Catheter Tip Culture - Preliminary





 Catheter Tip    MRSA














Assess/Plan/Problems-Billing


Assessment: 











- Patient Problems


(1) MRSA bacteremia


Current Visit: No   Status: Acute   Code(s): R78.81 - BACTEREMIA   SNOMED Code(s

): 26411713174078213


   Comment: MRSA bactermia is secondary to dialysis catheter infection.  The 

patient received vancomycin 2 gm 11/28, plus 1 gm 11/29, will get another 1.5 

gm after HD on 12/1.  Midline removed 11/29.  2 sets of blood C&S 11/30 all 

growing MRSA. Random vano level 12/1 was 15.9, on 12.2 was 21.2.  Two sets of 

blood C&S 12/2.  Pt refuses MATT.  I discussed question of removing dialysis 

catheter with Dr. Treadwell.  There are significant risks with multiple insertions 

of dialysis catheters.  Two sets of blood C&S received by micro lab 12/2 (one 

reg set and one pediatric bottle) both no growth on 12/3 1700 hrs.  


His vanco level went down by 3 between Sat and Sun, expect roughly the same 

drop from Sun to Mon.  I would like to give 1.5 gm vanco after dialysis 12/4 

and see if his level in AM on 12/6 is between 15 and 20.  If not, the 1.5 gm 

dose will need to be changed. He states he would rather get vanco for 6 weeks 

than have a MATT.     





(2) Acute renal failure


Current Visit: No   Status: Acute   Priority: High   Comment: ESRD


- Acute on CKD


Bicitra reduced to bid on 11/28/17.


BMP 12/4/17.


   





(3) CHF (congestive heart failure)


Current Visit: No   Status: Acute   Code(s): I50.9 - HEART FAILURE, UNSPECIFIED

   SNOMED Code(s): 75963963


   Comment: 


- EF 40-45 by echo 10/25/17.


- BB, ACEI not given due to hypotension.   





(4) Hyperammonemia


Current Visit: No   Status: Acute   Code(s): E72.20 - DISORDER OF UREA CYCLE 

METABOLISM, UNSPECIFIED   SNOMED Code(s): 2466508


   Comment: Continue rifaximin.    





(5) Morbid obesity


Current Visit: No   Status: Acute   Code(s): E66.01 - MORBID (SEVERE) OBESITY 

DUE TO EXCESS CALORIES   SNOMED Code(s): 399311410


   Comment: BMI 44.7.   





(6) Cellulitis


Current Visit: No   Status: Acute   Code(s): L03.90 - CELLULITIS, UNSPECIFIED   

SNOMED Code(s): 052474706


   Comment: - Patient has chronic leg wound with chronic venous stasis changes. 


- Completed first course of ABX


- Wash leg daily with soap and water. 


- Cover with gauze and ACE wraps.


Not clear if extensive rash is due to infection.   





(7) ESRD (end stage renal disease)


Current Visit: Yes   Status: Acute   Code(s): N18.6 - END STAGE RENAL DISEASE   

SNOMED Code(s): 76421635


   Comment: HD [facility] for ESRD.     





(8) Hypotension


Current Visit: Yes   Status: Acute   Comment: Midodrine increased to 10 mg tid 

on 12/1/17.   





(9) DVT prophylaxis


Current Visit: No   Status: Acute   Priority: High   Code(s): XML3383 -    

SNOMED Code(s): 392299021


   Comment: Patient refused sq heparint for about 2 days.  He states he would 

rather have a clot in his lungs than get sq heparin.  I asked him to try to 

tolerate sq heparin bid instead of tid and he agreed to try, start 12/3/17 9 

PM.

## 2017-12-04 LAB
ANION GAP SERPL CALC-SCNC: 13 MMOL/L (ref 2–11)
BUN SERPL-MCNC: 84 MG/DL (ref 6–24)
BUN/CREAT SERPL: 13.1 (ref 8–20)
CALCIUM SERPL-MCNC: 8.9 MG/DL (ref 8.6–10.3)
CHLORIDE SERPL-SCNC: 98 MMOL/L (ref 101–111)
GLUCOSE SERPL-MCNC: 112 MG/DL (ref 70–100)
HCO3 SERPL-SCNC: 23 MMOL/L (ref 22–32)
POTASSIUM SERPL-SCNC: 4 MMOL/L (ref 3.5–5)
SODIUM SERPL-SCNC: 134 MMOL/L (ref 133–145)

## 2017-12-04 RX ADMIN — VANCOMYCIN HYDROCHLORIDE SCH MLS/HR: 1 INJECTION, POWDER, LYOPHILIZED, FOR SOLUTION INTRAVENOUS at 17:03

## 2017-12-04 RX ADMIN — MIDODRINE HYDROCHLORIDE SCH MG: 5 TABLET ORAL at 08:16

## 2017-12-04 RX ADMIN — MIDODRINE HYDROCHLORIDE SCH MG: 5 TABLET ORAL at 22:47

## 2017-12-04 RX ADMIN — SODIUM CITRATE AND CITRIC ACID MONOHYDRATE SCH ML: 500; 334 SOLUTION ORAL at 08:16

## 2017-12-04 RX ADMIN — HEPARIN SODIUM SCH UNITS: 5000 INJECTION INTRAVENOUS; SUBCUTANEOUS at 08:17

## 2017-12-04 RX ADMIN — Medication SCH NOTE: at 08:06

## 2017-12-04 RX ADMIN — ATORVASTATIN CALCIUM SCH MG: 10 TABLET, FILM COATED ORAL at 17:03

## 2017-12-04 RX ADMIN — HEPARIN SODIUM SCH UNITS: 5000 INJECTION INTRAVENOUS; SUBCUTANEOUS at 22:48

## 2017-12-04 RX ADMIN — NYSTATIN SCH: 100000 POWDER TOPICAL at 12:44

## 2017-12-04 RX ADMIN — NYSTATIN SCH APPLIC: 100000 POWDER TOPICAL at 08:17

## 2017-12-04 RX ADMIN — MENTHOL AND BENZOCAINE PRN LOZ: 10; 6 LOZENGE ORAL at 12:58

## 2017-12-04 RX ADMIN — OMEPRAZOLE SCH MG: 20 CAPSULE, DELAYED RELEASE ORAL at 06:57

## 2017-12-04 RX ADMIN — Medication SCH CAP: at 08:17

## 2017-12-04 RX ADMIN — RIFAXIMIN SCH MG: 550 TABLET ORAL at 08:17

## 2017-12-04 RX ADMIN — RIFAXIMIN SCH MG: 550 TABLET ORAL at 22:47

## 2017-12-04 RX ADMIN — MIDODRINE HYDROCHLORIDE SCH: 5 TABLET ORAL at 12:44

## 2017-12-04 RX ADMIN — SODIUM CITRATE AND CITRIC ACID MONOHYDRATE SCH ML: 500; 334 SOLUTION ORAL at 22:47

## 2017-12-04 NOTE — PN
Subjective


Date of Service: 12/04/17


Interval History: 





Without new complaints.


Family History: Unchanged from Admission


Social History: Unchanged from Admission


Past Medical History: Unchanged from Admission





Objective


Active Medications: 








Acetaminophen (Tylenol Tab*)  650 mg PO Q4H PRN


   PRN Reason: PAIN


Atorvastatin Calcium (Lipitor*)  10 mg PO 1700 [facility]


   Last Admin: 12/04/17 17:03 Dose:  10 mg


Citric Acid/Sodium Citrate (Bicitra*)  15 ml PO BID [facility]


   Last Admin: 12/04/17 08:16 Dose:  15 ml


Heparin Sodium (Porcine) (Heparin Vial(*))  5,000 units SUBCUT Q12HR [facility]


   Last Admin: 12/04/17 08:17 Dose:  5,000 units


Vancomycin HCl 1,500 mg/ (Sodium Chloride)  500 mls @ 250 mls/hr IVPB [facility]@

1600 [facility]


   Last Admin: 12/04/17 17:03 Dose:  250 mls/hr


Lactobacillus Rhamnosus (Culturelle*)  1 cap PO DAILY [facility]


   Last Admin: 12/04/17 08:17 Dose:  1 cap


Loperamide HCl (Imodium Cap*)  2 mg PO QID PRN


   PRN Reason: DIARRHEA


Midodrine (Midodrine (Nf))  10 mg PO TID [facility]


   PRN Reason: Protocol


   Last Admin: 12/04/17 12:44 Dose:  Not Given


Nystatin (Nystatin Top Powder*)  1 applic TOPICAL TID [facility]


   Last Admin: 12/04/17 12:44 Dose:  Not Given


Omeprazole (Prilosec Cap*)  20 mg PO 0600 [facility]


   Last Admin: 12/04/17 06:57 Dose:  20 mg


Pharmacy Consult (Vancomycin Random Level*)  1 note FOLLOW UP [facility]@0600 [facility]


   Last Admin: 12/04/17 08:06 Dose:  1 note


Rifaximin (Xifaxan*)  550 mg PO BID [facility]


   Last Admin: 12/04/17 08:17 Dose:  550 mg


Throat Lozenges (Chloraseptic Kimberly*)  1 kimberly PO Q6H PRN


   PRN Reason: SORE THROAT


   Last Admin: 12/04/17 12:58 Dose:  1 kimberly








 Vital Signs











  12/03/17 12/04/17 12/04/17





  21:01 00:02 03:48


 


Temperature 97.9 F 97.8 F 98.0 F


 


Pulse Rate 101 99 101


 


Respiratory 21 20 19





Rate   


 


Blood Pressure 108/61 109/59 96/47





(mmHg)   


 


O2 Sat by Pulse 98 97 96





Oximetry   














  12/04/17 12/04/17





  07:45 08:00


 


Temperature 98.2 F 


 


Pulse Rate 99 


 


Respiratory 18 18





Rate  


 


Blood Pressure 110/65 





(mmHg)  


 


O2 Sat by Pulse 98 





Oximetry  











Oxygen Devices in Use Now: None


Appearance: Obese gentleman lying in  bed in NAD


Eyes: No Scleral Icterus


Ears/Nose/Mouth/Throat: Mucous Membranes Moist


Neck: No Thyroid Enlargement, Masses


Respiratory: Clear to Auscultation


Cardiovascular: - - S1S2 piter


Abdominal: NL Sounds; No Tenderness; No Distention, No Hepatosplenomegaly, - - 

Obese


Lymphatic: No Cervical Adenopathy


Neurological: Alert and Oriented x 3


Result Diagrams: 


 12/01/17 06:20





 12/04/17 06:28


Microbiology and Other Data: 


 Microbiology











 11/28/17 16:15 Catheter Tip Culture - Preliminary





 Catheter Tip    MRSA














Assess/Plan/Problems-Billing


Assessment: 











- Patient Problems


(1) Acute on chronic kidney failure


Current Visit: Yes   Status: Acute   Code(s): N17.9 - ACUTE KIDNEY FAILURE, 

UNSPECIFIED; N18.9 - CHRONIC KIDNEY DISEASE, UNSPECIFIED   SNOMED Code(s): 

671585521


   Comment: Now with just ESRD getting HD M,W, FRi   





(2) Hypotension


Current Visit: Yes   Status: Acute   Comment: BP's acceptable with increase 

dose of Midodrine.   





(3) CHF (congestive heart failure)


Current Visit: No   Status: Acute   Code(s): I50.9 - HEART FAILURE, UNSPECIFIED

   SNOMED Code(s): 46070861


   Comment: Stable. No evidence of acute exacerbation at this time.   





(4) Cellulitis


Current Visit: No   Status: Acute   Code(s): L03.90 - CELLULITIS, UNSPECIFIED   

SNOMED Code(s): 444082671


   Comment: Patient has chronic leg wound with chronic venous stasis changes. 


Continue Vanco for 6 week course.


Wash leg daily with soap and water. 


Cover with gauze and ACE wraps.


   





(5) Hyperammonemia


Current Visit: No   Status: Acute   Code(s): E72.20 - DISORDER OF UREA CYCLE 

METABOLISM, UNSPECIFIED   SNOMED Code(s): 7278599


   Comment: Continue rifaximin.    





(6) MRSA bacteremia


Current Visit: No   Status: Acute   Code(s): R78.81 - BACTEREMIA   SNOMED Code(s

): 31815232828094262


   Comment: MRSA bactermia is secondary to dialysis catheter infection. Pt 

refuses MATT. Dr. Treadwell notes there are significant risks with multiple 

insertions of dialysis catheters.  Two sets of blood C&S received by micro lab 

12/2 (one reg set and one pediatric bottle) both no growth on 12/3 1700 hrs.  


He states he would rather get vanco for 6 weeks than have a MATT.     





(7) ESRD (end stage renal disease)


Current Visit: Yes   Status: Acute   Code(s): N18.6 - END STAGE RENAL DISEASE   

SNOMED Code(s): 54286371


   Comment: HD [facility] for ESRD.     





(8) DVT prophylaxis


Current Visit: No   Status: Acute   Priority: High   Code(s): KUZ0700 -    

SNOMED Code(s): 407002874


   Comment: Sub q heparin

## 2017-12-05 RX ADMIN — SODIUM CITRATE AND CITRIC ACID MONOHYDRATE SCH ML: 500; 334 SOLUTION ORAL at 21:31

## 2017-12-05 RX ADMIN — MIDODRINE HYDROCHLORIDE SCH MG: 5 TABLET ORAL at 21:31

## 2017-12-05 RX ADMIN — NYSTATIN SCH APPLIC: 100000 POWDER TOPICAL at 15:00

## 2017-12-05 RX ADMIN — SODIUM CITRATE AND CITRIC ACID MONOHYDRATE SCH ML: 500; 334 SOLUTION ORAL at 09:38

## 2017-12-05 RX ADMIN — Medication SCH CAP: at 09:38

## 2017-12-05 RX ADMIN — NYSTATIN SCH APPLIC: 100000 POWDER TOPICAL at 09:20

## 2017-12-05 RX ADMIN — RIFAXIMIN SCH MG: 550 TABLET ORAL at 09:38

## 2017-12-05 RX ADMIN — RIFAXIMIN SCH MG: 550 TABLET ORAL at 21:31

## 2017-12-05 RX ADMIN — MIDODRINE HYDROCHLORIDE SCH MG: 5 TABLET ORAL at 16:21

## 2017-12-05 RX ADMIN — NYSTATIN SCH APPLIC: 100000 POWDER TOPICAL at 21:34

## 2017-12-05 RX ADMIN — ATORVASTATIN CALCIUM SCH MG: 10 TABLET, FILM COATED ORAL at 16:33

## 2017-12-05 RX ADMIN — MIDODRINE HYDROCHLORIDE SCH MG: 5 TABLET ORAL at 09:47

## 2017-12-05 RX ADMIN — HEPARIN SODIUM SCH UNITS: 5000 INJECTION INTRAVENOUS; SUBCUTANEOUS at 21:34

## 2017-12-05 RX ADMIN — HEPARIN SODIUM SCH UNITS: 5000 INJECTION INTRAVENOUS; SUBCUTANEOUS at 09:38

## 2017-12-05 RX ADMIN — NYSTATIN SCH: 100000 POWDER TOPICAL at 00:40

## 2017-12-05 RX ADMIN — OMEPRAZOLE SCH MG: 20 CAPSULE, DELAYED RELEASE ORAL at 05:55

## 2017-12-05 NOTE — PN
Subjective


Date of Service: 12/05/17


Interval History: 





Patients only complaint is itching all over his body.


Family History: Unchanged from Admission


Social History: Unchanged from Admission


Past Medical History: Unchanged from Admission





Objective


Active Medications: 








Acetaminophen (Tylenol Tab*)  650 mg PO Q4H PRN


   PRN Reason: PAIN


Atorvastatin Calcium (Lipitor*)  10 mg PO 1700 [facility]


   Last Admin: 12/04/17 17:03 Dose:  10 mg


Citric Acid/Sodium Citrate (Bicitra*)  15 ml PO BID [facility]


   Last Admin: 12/05/17 09:38 Dose:  15 ml


Diphenhydramine HCl (Benadryl Po*)  25 mg PO Q6H PRN


   PRN Reason: PRURITIS


Heparin Sodium (Porcine) (Heparin Vial(*))  5,000 units SUBCUT Q12HR [facility]


   Last Admin: 12/05/17 09:38 Dose:  5,000 units


Vancomycin HCl 1,500 mg/ (Sodium Chloride)  500 mls @ 250 mls/hr IVPB [facility]@

1600 [facility]


   Last Admin: 12/04/17 17:03 Dose:  250 mls/hr


Lactobacillus Rhamnosus (Culturelle*)  1 cap PO DAILY [facility]


   Last Admin: 12/05/17 09:38 Dose:  1 cap


Loperamide HCl (Imodium Cap*)  2 mg PO QID PRN


   PRN Reason: DIARRHEA


Midodrine (Midodrine (Nf))  10 mg PO TID [facility]


   PRN Reason: Protocol


   Last Admin: 12/05/17 16:21 Dose:  10 mg


Nystatin (Nystatin Top Powder*)  1 applic TOPICAL TID [facility]


   Last Admin: 12/05/17 15:00 Dose:  1 applic


Omeprazole (Prilosec Cap*)  20 mg PO 0600 [facility]


   Last Admin: 12/05/17 05:55 Dose:  20 mg


Pharmacy Consult (Vancomycin Random Level*)  1 note FOLLOW UP [facility]@0600 [facility]


   Last Admin: 12/04/17 08:06 Dose:  1 note


Rifaximin (Xifaxan*)  550 mg PO BID [facility]


   Last Admin: 12/05/17 09:38 Dose:  550 mg


Throat Lozenges (Chloraseptic Kimberly*)  1 kimberly PO Q6H PRN


   PRN Reason: SORE THROAT


   Last Admin: 12/04/17 12:58 Dose:  1 kimberly








Oxygen Devices in Use Now: None


Ears/Nose/Mouth/Throat: Mucous Membranes Moist


Neck: No Thyroid Enlargement, Masses


Respiratory: Clear to Auscultation


Cardiovascular: - - S1S2 piter


Abdominal: - - Obese


Lymphatic: No Cervical Adenopathy


Extremities: No Clubbing, Cyanosis


Skin: - - Erythematous papules on chest


Neurological: Alert and Oriented x 3


Result Diagrams: 


 12/01/17 06:20





 12/04/17 06:28


Microbiology and Other Data: 


 Microbiology











 11/28/17 16:15 Catheter Tip Culture - Preliminary





 Catheter Tip    MRSA














Assess/Plan/Problems-Billing


Assessment: 











- Patient Problems


(1) Acute on chronic kidney failure


Current Visit: Yes   Status: Acute   Code(s): N17.9 - ACUTE KIDNEY FAILURE, 

UNSPECIFIED; N18.9 - CHRONIC KIDNEY DISEASE, UNSPECIFIED   SNOMED Code(s): 

377882036


   Comment: Spoke to Dr. Hernandez, nephrology, we are not sure if it is ESRD and 

he will need HD from now on. He says he needs more time. If we could get him to 

stand and pivot, it would not be an issue. He could get a bed as outpatient 

dialysis if he could .Now with just continue HD M,W, FRi   





(2) Hypotension


Current Visit: Yes   Status: Acute   Comment: BP's acceptable with increase 

dose of Midodrine.   





(3) CHF (congestive heart failure)


Current Visit: No   Status: Acute   Code(s): I50.9 - HEART FAILURE, UNSPECIFIED

   SNOMED Code(s): 56623574


   Comment: Stable. No evidence of acute exacerbation at this time.   





(4) Cellulitis


Current Visit: No   Status: Acute   Code(s): L03.90 - CELLULITIS, UNSPECIFIED   

SNOMED Code(s): 929101216


   Comment: Patient has chronic leg wound with chronic venous stasis changes. 


Continue Vanco for 6 week course.


Wash leg daily with soap and water. 


Cover with gauze and ACE wraps.


   





(5) Hyperammonemia


Current Visit: No   Status: Acute   Code(s): E72.20 - DISORDER OF UREA CYCLE 

METABOLISM, UNSPECIFIED   SNOMED Code(s): 5007345


   Comment: Stable. Check level in AM .Continue rifaximin.    





(6) MRSA bacteremia


Current Visit: No   Status: Acute   Code(s): R78.81 - BACTEREMIA   SNOMED Code(s

): 22703214259586932


   Comment: MRSA bactermia is secondary to dialysis catheter infection. Pt 

refuses MATT. Dr. Treadwell notes there are significant risks with multiple 

insertions of dialysis catheters.  Two sets of blood C&S received by micro lab 

12/2 (one reg set and one pediatric bottle) both no growth on 12/3 1700 hrs.  


He states he would rather get vanco for 6 weeks than have a MATT.     





(7) Pruritus


Current Visit: Yes   Status: Acute   Code(s): L29.9 - PRURITUS, UNSPECIFIED   

SNOMED Code(s): 464400055


   Comment: Suspect from uremia but not improving. Started Benadryl prn. 

Consider hydrocortisone cream.   





(8) DVT prophylaxis


Current Visit: No   Status: Acute   Priority: High   Code(s): DOO2510 -    

SNOMED Code(s): 037640500


   Comment: Sub q heparin

## 2017-12-06 RX ADMIN — RIFAXIMIN SCH MG: 550 TABLET ORAL at 10:58

## 2017-12-06 RX ADMIN — MENTHOL AND BENZOCAINE PRN LOZ: 10; 6 LOZENGE ORAL at 12:48

## 2017-12-06 RX ADMIN — RIFAXIMIN SCH MG: 550 TABLET ORAL at 20:56

## 2017-12-06 RX ADMIN — HEPARIN SODIUM SCH UNITS: 5000 INJECTION INTRAVENOUS; SUBCUTANEOUS at 10:57

## 2017-12-06 RX ADMIN — NYSTATIN SCH APPLIC: 100000 POWDER TOPICAL at 20:56

## 2017-12-06 RX ADMIN — MIDODRINE HYDROCHLORIDE SCH MG: 5 TABLET ORAL at 15:37

## 2017-12-06 RX ADMIN — HEPARIN SODIUM SCH: 5000 INJECTION INTRAVENOUS; SUBCUTANEOUS at 11:07

## 2017-12-06 RX ADMIN — OMEPRAZOLE SCH MG: 20 CAPSULE, DELAYED RELEASE ORAL at 05:22

## 2017-12-06 RX ADMIN — MENTHOL AND BENZOCAINE PRN LOZ: 10; 6 LOZENGE ORAL at 00:02

## 2017-12-06 RX ADMIN — SODIUM CITRATE AND CITRIC ACID MONOHYDRATE SCH ML: 500; 334 SOLUTION ORAL at 10:57

## 2017-12-06 RX ADMIN — NYSTATIN SCH APPLIC: 100000 POWDER TOPICAL at 10:58

## 2017-12-06 RX ADMIN — HEPARIN SODIUM SCH: 5000 INJECTION INTRAVENOUS; SUBCUTANEOUS at 20:50

## 2017-12-06 RX ADMIN — ATORVASTATIN CALCIUM SCH MG: 10 TABLET, FILM COATED ORAL at 15:37

## 2017-12-06 RX ADMIN — DIPHENHYDRAMINE HYDROCHLORIDE PRN MG: 25 CAPSULE ORAL at 05:22

## 2017-12-06 RX ADMIN — MENTHOL AND BENZOCAINE PRN LOZ: 10; 6 LOZENGE ORAL at 21:18

## 2017-12-06 RX ADMIN — MIDODRINE HYDROCHLORIDE SCH MG: 5 TABLET ORAL at 10:57

## 2017-12-06 RX ADMIN — NYSTATIN SCH APPLIC: 100000 POWDER TOPICAL at 15:44

## 2017-12-06 RX ADMIN — MIDODRINE HYDROCHLORIDE SCH MG: 5 TABLET ORAL at 20:55

## 2017-12-06 RX ADMIN — ONDANSETRON PRN MG: 2 INJECTION INTRAMUSCULAR; INTRAVENOUS at 12:43

## 2017-12-06 RX ADMIN — Medication SCH: at 08:45

## 2017-12-06 RX ADMIN — SODIUM CITRATE AND CITRIC ACID MONOHYDRATE SCH ML: 500; 334 SOLUTION ORAL at 20:55

## 2017-12-06 RX ADMIN — VANCOMYCIN HYDROCHLORIDE SCH MLS/HR: 1 INJECTION, POWDER, LYOPHILIZED, FOR SOLUTION INTRAVENOUS at 16:47

## 2017-12-06 RX ADMIN — Medication SCH CAP: at 10:57

## 2017-12-06 NOTE — PN
Subjective


Date of Service: 12/06/17


Interval History: 





Patient had projectile vomiting at the beginning of dialysis that resolved with 

1 dose of zofran with no recurrence patient states this happens intermittently 

without provocation. Patient also complains of significant dysuria and 

difficulty starting urine stream. UA grossly positive, culture pending, on 

Vancomycin already. Patient states that his abdomen hurts at his heparin 

injection site. Patient also has pain in his calf with manipulation of his 

right lower leg, but no pain with palpation. 


Family History: Unchanged from Admission


Social History: Unchanged from Admission


Past Medical History: Unchanged from Admission





Objective


Active Medications: 








Acetaminophen (Tylenol Tab*)  650 mg PO Q4H PRN


   PRN Reason: PAIN


Atorvastatin Calcium (Lipitor*)  10 mg PO 1700 [facility]


   Last Admin: 12/06/17 15:37 Dose:  10 mg


Citric Acid/Sodium Citrate (Bicitra*)  15 ml PO BID [facility]


   Last Admin: 12/06/17 10:57 Dose:  15 ml


Diphenhydramine HCl (Benadryl Po*)  25 mg PO Q6H PRN


   PRN Reason: PRURITIS


   Last Admin: 12/06/17 05:22 Dose:  25 mg


Heparin Sodium (Porcine) (Heparin Vial(*))  5,000 units SUBCUT Q12HR [facility]


   Last Admin: 12/06/17 11:07 Dose:  Not Given


Vancomycin HCl 1,500 mg/ (Sodium Chloride)  500 mls @ 250 mls/hr IVPB [facility]@

1600 [facility]


   Last Admin: 12/06/17 16:47 Dose:  250 mls/hr


Lactobacillus Rhamnosus (Culturelle*)  1 cap PO DAILY [facility]


   Last Admin: 12/06/17 10:57 Dose:  1 cap


Loperamide HCl (Imodium Cap*)  2 mg PO QID PRN


   PRN Reason: DIARRHEA


Midodrine (Midodrine (Nf))  10 mg PO TID [facility]


   PRN Reason: Protocol


   Last Admin: 12/06/17 15:37 Dose:  10 mg


Nystatin (Nystatin Top Powder*)  1 applic TOPICAL TID [facility]


   Last Admin: 12/06/17 15:44 Dose:  1 applic


Omeprazole (Prilosec Cap*)  20 mg PO 0600 [facility]


   Last Admin: 12/06/17 05:22 Dose:  20 mg


Ondansetron HCl (Zofran Inj*)  4 mg IV Q4H PRN


   PRN Reason: NAUSEA


   Last Admin: 12/06/17 12:43 Dose:  4 mg


Pharmacy Consult (Vancomycin Random Level*)  1 note FOLLOW UP [facility]@0600 [facility]


   Last Admin: 12/06/17 08:45 Dose:  Not Given


Rifaximin (Xifaxan*)  550 mg PO BID [facility]


   Last Admin: 12/06/17 10:58 Dose:  550 mg


Throat Lozenges (Chloraseptic Kimberly*)  1 kimberly PO Q6H PRN


   PRN Reason: SORE THROAT


   Last Admin: 12/06/17 12:48 Dose:  1 kimberly








 Vital Signs - 8 hr











  12/06/17 12/06/17 12/06/17





  12:00 13:30 14:30


 


Pulse Rate 113 100 102


 


Blood Pressure 130/67 132/74 127/88





(mmHg)   











Oxygen Devices in Use Now: None


Appearance: Patient is a very obese male who appears older than stated age and 

is sitting in the dialysis chair in [facility].


Eyes: No Scleral Icterus, PERRLA


Ears/Nose/Mouth/Throat: NL Teeth, Lips, Gums, Clear Oropharnyx, Mucous 

Membranes Moist


Neck: NL Appearance and Movements; NL JVP, Trachea Midline


Respiratory: Symmetrical Chest Expansion and Respiratory Effort, Clear to 

Auscultation - Diminished throughout, limited by body habitus., -


Cardiovascular: NL Sounds; No Murmurs; No JVD, RRR, - - 2+ pitting edema with 

scaly skin in B/L LE with the left being worse than that right.


Abdominal: NL Sounds; No Tenderness; No Distention, No Hepatosplenomegaly, - - 

Exam limited by body habitus.


Lymphatic: No Cervical Adenopathy


Extremities: No Edema, No Clubbing, Cyanosis


Skin: No Nodules or Sclerosis


Neurological: Alert and Oriented x 3, - - CN II-XII intact.


Result Diagrams: 


 12/01/17 06:20





 12/04/17 06:28


Microbiology and Other Data: 


 Microbiology











 11/28/17 16:15 Catheter Tip Culture - Preliminary





 Catheter Tip    MRSA














Assess/Plan/Problems-Billing


Assessment





- Patient Problems


(1) ESRD (end stage renal disease)


Current Visit: Yes   Status: Acute   Code(s): N18.6 - END STAGE RENAL DISEASE   

SNOMED Code(s): 70563153


   Comment: HD [facility] for ESRD.  Patient given midrodrine for associated 

hypotension. Patient still making urine. Dialysis catheter previously infected, 

has temporary. 


Appreciate Nephrology consult, not sure if this is ESRD or if this will 

improve. Will continue to monitor. Unable to transfer for outpatient dialysis. 

   





(2) Hypotension


Current Visit: Yes   Status: Acute   Comment: BP's acceptable with increase 

dose of Midodrine.   





(3) Pruritus


Current Visit: Yes   Status: Acute   Code(s): L29.9 - PRURITUS, UNSPECIFIED   

SNOMED Code(s): 125107815


   Comment: 


Suspect from uremia, somwehat improved. Benadryl PRN helpful. Will consider 

hydrocortisone cream if unimproved   





(4) CHF (congestive heart failure)


Current Visit: No   Status: Acute   Code(s): I50.9 - HEART FAILURE, UNSPECIFIED

   SNOMED Code(s): 60300518


   Comment: Stable. No evidence of acute exacerbation at this time.   





(5) Cellulitis


Current Visit: No   Status: Acute   Code(s): L03.90 - CELLULITIS, UNSPECIFIED   

SNOMED Code(s): 659734875


   Comment: Patient has chronic leg wound with chronic venous stasis changes. 


Continue Vanco for 6 week course.


Wash leg daily with soap and water. 


Cover with gauze and ACE wraps.


   





(6) MRSA bacteremia


Current Visit: No   Status: Acute   Code(s): R78.81 - BACTEREMIA   SNOMED Code(s

): 14128932508978101


   Comment: MRSA bactermia is secondary to dialysis catheter infection. Pt 

refuses MATT. Dr. Treadwell notes there are significant risks with multiple 

insertions of dialysis catheters.  Two sets of blood C&S received by micro lab 

12/2 (one reg set and one pediatric bottle) both no growth on 12/3 1700 hrs.  


He states he would rather get vanco for 6 weeks than have a MATT.     





(7) UTI (urinary tract infection)


Current Visit: No   Status: Resolved   Comment: 


Dysuria and difficulty starting stream. On Vancomycin, will wait for culture.  

  





(8) DVT prophylaxis


Current Visit: No   Status: Acute   Priority: High   Code(s): TYM5676 -    

SNOMED Code(s): 113555101


   Comment: Sub q heparin   





(9) Full code status


Current Visit: No   Status: Acute   Priority: High   Code(s): Z78.9 - OTHER 

SPECIFIED HEALTH STATUS   SNOMED Code(s): 421425386


   Comment: 


- Patient clear about wanting to be full code   


Status and Disposition: 





Patient is admitted inpatient. Will transfer to [facility] if unable to find 

dialysis capable facility that will take him.

## 2017-12-07 LAB
ADD DIFF/SLIDE REVIEW?: (no result)
ANION GAP SERPL CALC-SCNC: 11 MMOL/L (ref 2–11)
BUN SERPL-MCNC: 62 MG/DL (ref 6–24)
BUN/CREAT SERPL: 12.9 (ref 8–20)
CALCIUM SERPL-MCNC: 9.1 MG/DL (ref 8.6–10.3)
CHLORIDE SERPL-SCNC: 99 MMOL/L (ref 101–111)
GLUCOSE SERPL-MCNC: 124 MG/DL (ref 70–100)
HCO3 SERPL-SCNC: 23 MMOL/L (ref 22–32)
HCT VFR BLD AUTO: 29 % (ref 42–52)
HGB BLD-MCNC: 9.5 G/DL (ref 14–18)
MAGNESIUM SERPL-MCNC: 1.8 MG/DL (ref 1.9–2.7)
MCH RBC QN AUTO: 29 PG (ref 27–31)
MCHC RBC AUTO-ENTMCNC: 33 G/DL (ref 31–36)
MCV RBC AUTO: 90 FL (ref 80–94)
POTASSIUM SERPL-SCNC: 4.3 MMOL/L (ref 3.5–5)
RBC # BLD AUTO: 3.22 10^6/UL (ref 4–5.4)
SODIUM SERPL-SCNC: 133 MMOL/L (ref 133–145)
WBC # BLD AUTO: 9.8 10^3/UL (ref 3.5–10.8)

## 2017-12-07 RX ADMIN — ONDANSETRON PRN MG: 2 INJECTION INTRAMUSCULAR; INTRAVENOUS at 03:01

## 2017-12-07 RX ADMIN — NYSTATIN SCH APPLIC: 100000 POWDER TOPICAL at 20:33

## 2017-12-07 RX ADMIN — Medication SCH APPLIC: at 20:29

## 2017-12-07 RX ADMIN — MENTHOL AND BENZOCAINE PRN LOZ: 10; 6 LOZENGE ORAL at 03:06

## 2017-12-07 RX ADMIN — HEPARIN SODIUM SCH: 5000 INJECTION INTRAVENOUS; SUBCUTANEOUS at 20:33

## 2017-12-07 RX ADMIN — MIDODRINE HYDROCHLORIDE SCH MG: 5 TABLET ORAL at 10:08

## 2017-12-07 RX ADMIN — Medication SCH CAP: at 10:08

## 2017-12-07 RX ADMIN — ATORVASTATIN CALCIUM SCH MG: 10 TABLET, FILM COATED ORAL at 16:24

## 2017-12-07 RX ADMIN — ONDANSETRON PRN MG: 2 INJECTION INTRAMUSCULAR; INTRAVENOUS at 13:31

## 2017-12-07 RX ADMIN — SODIUM CITRATE AND CITRIC ACID MONOHYDRATE SCH ML: 500; 334 SOLUTION ORAL at 20:26

## 2017-12-07 RX ADMIN — MIDODRINE HYDROCHLORIDE SCH MG: 5 TABLET ORAL at 20:26

## 2017-12-07 RX ADMIN — NYSTATIN SCH APPLIC: 100000 POWDER TOPICAL at 16:25

## 2017-12-07 RX ADMIN — RIFAXIMIN SCH MG: 550 TABLET ORAL at 20:26

## 2017-12-07 RX ADMIN — SODIUM CITRATE AND CITRIC ACID MONOHYDRATE SCH ML: 500; 334 SOLUTION ORAL at 10:06

## 2017-12-07 RX ADMIN — NYSTATIN SCH APPLIC: 100000 POWDER TOPICAL at 12:09

## 2017-12-07 RX ADMIN — BENZONATATE PRN MG: 100 CAPSULE ORAL at 13:31

## 2017-12-07 RX ADMIN — MENTHOL AND BENZOCAINE PRN LOZ: 10; 6 LOZENGE ORAL at 20:26

## 2017-12-07 RX ADMIN — GUAIFENESIN SCH MG: 600 TABLET, EXTENDED RELEASE ORAL at 12:06

## 2017-12-07 RX ADMIN — RIFAXIMIN SCH MG: 550 TABLET ORAL at 10:08

## 2017-12-07 RX ADMIN — HEPARIN SODIUM SCH: 5000 INJECTION INTRAVENOUS; SUBCUTANEOUS at 10:27

## 2017-12-07 RX ADMIN — MIDODRINE HYDROCHLORIDE SCH MG: 5 TABLET ORAL at 16:24

## 2017-12-07 RX ADMIN — OMEPRAZOLE SCH MG: 20 CAPSULE, DELAYED RELEASE ORAL at 05:48

## 2017-12-07 RX ADMIN — GUAIFENESIN SCH MG: 600 TABLET, EXTENDED RELEASE ORAL at 20:26

## 2017-12-07 NOTE — PN
Subjective


Date of Service: 12/07/17


Interval History: 





Patient has no complaints at the time of interview. Patient had post-tussive 

vomiting earlier today and yesterday. Patient states that he has had a cough 

since he quit smoking and that this often happens to him without nausea. 

Patient denies post nasal drip, congestion, wheezing or other respiratory 

symptoms. Patient is itching all over and has many open areas on his exposed 

areas such as arms and abdomen. Patient also has a stage 2 pressure ulcer over 

the upper part of his sacrum and an area of blanchable redness around his whole 

buttocks and upper thighs. Patient has continued pain with urination and 

difficulty initiating stream.


Family History: Unchanged from Admission


Social History: Unchanged from Admission


Past Medical History: Unchanged from Admission





Objective


Active Medications: 








Acetaminophen (Tylenol Tab*)  650 mg PO Q4H PRN


   PRN Reason: PAIN


Atorvastatin Calcium (Lipitor*)  10 mg PO 1700 [facility]


   Last Admin: 12/07/17 16:24 Dose:  10 mg


Benzonatate (Tessalon Cap*)  100 mg PO BID PRN


   PRN Reason: COUGH


   Last Admin: 12/07/17 13:31 Dose:  100 mg


Citric Acid/Sodium Citrate (Bicitra*)  15 ml PO BID [facility]


   Last Admin: 12/07/17 10:06 Dose:  15 ml


Diphenhydramine HCl (Benadryl Po*)  25 mg PO Q6H PRN


   PRN Reason: PRURITIS


   Last Admin: 12/06/17 05:22 Dose:  25 mg


Guaifenesin (Mucinex*)  1,200 mg PO BID [facility]


   Last Admin: 12/07/17 12:06 Dose:  1,200 mg


Heparin Sodium (Porcine) (Heparin Vial(*))  5,000 units SUBCUT Q12HR [facility]


   Last Admin: 12/07/17 10:27 Dose:  Not Given


Vancomycin HCl 1,500 mg/ (Sodium Chloride)  500 mls @ 250 mls/hr IVPB [facility]@

1600 [facility]


   Last Admin: 12/06/17 16:47 Dose:  250 mls/hr


Lactobacillus Rhamnosus (Culturelle*)  1 cap PO DAILY [facility]


   Last Admin: 12/07/17 10:08 Dose:  1 cap


Loperamide HCl (Imodium Cap*)  2 mg PO QID PRN


   PRN Reason: DIARRHEA


Midodrine (Midodrine (Nf))  10 mg PO TID [facility]


   PRN Reason: Protocol


   Last Admin: 12/07/17 16:24 Dose:  10 mg


Nystatin (Nystatin Top Powder*)  1 applic TOPICAL TID [facility]


   Last Admin: 12/07/17 16:25 Dose:  1 applic


Omeprazole (Prilosec Cap*)  20 mg PO 0600 [facility]


   Last Admin: 12/07/17 05:48 Dose:  20 mg


Ondansetron HCl (Zofran Inj*)  4 mg IV Q4H PRN


   PRN Reason: NAUSEA


   Last Admin: 12/07/17 13:31 Dose:  4 mg


Pharmacy Consult (Vancomycin Random Level*)  1 note FOLLOW UP [facility]@0600 [facility]


   Last Admin: 12/06/17 08:45 Dose:  Not Given


Rifaximin (Xifaxan*)  550 mg PO BID [facility]


   Last Admin: 12/07/17 10:08 Dose:  550 mg


Throat Lozenges (Chloraseptic Kimberly*)  1 kimberly PO Q6H PRN


   PRN Reason: SORE THROAT


   Last Admin: 12/07/17 03:06 Dose:  1 kimberly








 Vital Signs - 8 hr











  12/07/17





  15:31


 


Temperature 98.0 F


 


Pulse Rate 111


 


Respiratory 20





Rate 


 


Blood Pressure 106/60





(mmHg) 


 


O2 Sat by Pulse 97





Oximetry 











Oxygen Devices in Use Now: None


Appearance: Patient is a 61yo male who is morbidly obese and has many scabbed 

and visibly open areas on arms, legs and torso with small smears of blood 

around some of them.


Eyes: No Scleral Icterus, PERRLA


Ears/Nose/Mouth/Throat: NL Teeth, Lips, Gums, Clear Oropharnyx, Mucous 

Membranes Moist


Neck: NL Appearance and Movements; NL JVP, Trachea Midline


Respiratory: Symmetrical Chest Expansion and Respiratory Effort, Clear to 

Auscultation


Cardiovascular: NL Sounds; No Murmurs; No JVD, - - Tachycardic around 100. 3+ 

pitting edema in B/L LE consistent with pervious exams.


Abdominal: No Hepatosplenomegaly, - - superficial tenderness to palpation 

around umbilicus.


Lymphatic: No Cervical Adenopathy


Extremities: No Clubbing, Cyanosis, - - Edema as above. Scaling of the skin and 

stasis related changes in B/L LE.


Skin: No Nodules or Sclerosis


Neurological: Alert and Oriented x 3, - - CN II-XII intact.


Result Diagrams: 


 12/07/17 05:53





 12/07/17 05:53


Microbiology and Other Data: 


 Microbiology











 11/28/17 16:15 Catheter Tip Culture - Preliminary





 Catheter Tip    MRSA














Assess/Plan/Problems-Billing


Assessment





- Patient Problems


(1) Acute on chronic kidney failure


Current Visit: Yes   Status: Acute   Code(s): N17.9 - ACUTE KIDNEY FAILURE, 

UNSPECIFIED; N18.9 - CHRONIC KIDNEY DISEASE, UNSPECIFIED   SNOMED Code(s): 

629610217


   Comment: HD [facility] for CKD that may improve.  Patient given midrodrine for 

associated hypotension. Patient still making urine. Dialysis catheter 

previously infected, has temporary. 


Appreciate Nephrology consult, not sure if this is ESRD or if this will 

improve. Will continue to monitor. Unable to transfer for outpatient dialysis. 

   





(2) Hypotension


Current Visit: Yes   Status: Acute   Comment: BP's acceptable with increase 

dose of Midodrine.   





(3) Pruritus


Current Visit: Yes   Status: Acute   Code(s): L29.9 - PRURITUS, UNSPECIFIED   

SNOMED Code(s): 028401482


   Comment: 


Suspect from uremia, somwehat improved. Benadryl PRN helpful. Will try 

hydrocortisone cream for a short time due to concerns about slowing wound 

healing.   





(4) CHF (congestive heart failure)


Current Visit: No   Status: Acute   Code(s): I50.9 - HEART FAILURE, UNSPECIFIED

   SNOMED Code(s): 39227968


   Comment: Stable. No evidence of acute exacerbation at this time.   





(5) Cellulitis


Current Visit: No   Status: Acute   Code(s): L03.90 - CELLULITIS, UNSPECIFIED   

SNOMED Code(s): 135676339


   Comment: Patient has chronic leg wound with chronic venous stasis changes. 


Continue Vanco for 6 week course.


Wash leg daily with soap and water. 


Cover with gauze and ACE wraps.


   





(6) MRSA bacteremia


Current Visit: No   Status: Acute   Code(s): R78.81 - BACTEREMIA   SNOMED Code(s

): 76314403984251963


   Comment: MRSA bactermia is secondary to dialysis catheter infection. Pt 

refuses MATT. Dr. Treadwell notes there are significant risks with multiple 

insertions of dialysis catheters.  Two sets of blood C&S received by micro lab 

12/2 (one reg set and one pediatric bottle) both no growth on 12/3 1700 hrs.  


He states he would rather get vanco for 6 weeks than have a MATT.     





(7) UTI (urinary tract infection)


Current Visit: No   Status: Resolved   Comment: 


Dysuria and difficulty starting stream. On Vancomycin, will wait for culture.  

  





(8) Post-tussive emesis


Current Visit: Yes   Status: Acute   Code(s): R11.10 - VOMITING, UNSPECIFIED   

SNOMED Code(s): 801093129


   Comment: 


Chronic problem for patient. Zofran and Antitussives ordered PRN.   





(9) DVT prophylaxis


Current Visit: No   Status: Acute   Priority: High   Code(s): QTS3775 -    

SNOMED Code(s): 090244022


   Comment: Sub q heparin   





(10) Full code status


Current Visit: No   Status: Acute   Priority: High   Code(s): Z78.9 - OTHER 

SPECIFIED HEALTH STATUS   SNOMED Code(s): 062857678


   Comment: 


- Patient clear about wanting to be full code   


Status and Disposition: 





Patient is admitted inpatient. Will transfer to [facility] if acute problems resolve 

and no facility willing to take him.

## 2017-12-08 LAB
ALBUMIN SERPL BCG-MCNC: 2.4 G/DL (ref 3.2–5.2)
ALP SERPL-CCNC: 175 U/L (ref 34–104)
ALT SERPL W P-5'-P-CCNC: 7 U/L (ref 7–52)
ANION GAP SERPL CALC-SCNC: 9 MMOL/L (ref 2–11)
AST SERPL-CCNC: 18 U/L (ref 13–39)
BUN SERPL-MCNC: 66 MG/DL (ref 6–24)
BUN/CREAT SERPL: 13 (ref 8–20)
CALCIUM SERPL-MCNC: 9.1 MG/DL (ref 8.6–10.3)
CHLORIDE SERPL-SCNC: 97 MMOL/L (ref 101–111)
GLOBULIN SER CALC-MCNC: 3.6 G/DL (ref 2–4)
GLUCOSE SERPL-MCNC: 88 MG/DL (ref 70–100)
HCO3 SERPL-SCNC: 23 MMOL/L (ref 22–32)
HCT VFR BLD AUTO: 28 % (ref 42–52)
HGB BLD-MCNC: 9.4 G/DL (ref 14–18)
MCH RBC QN AUTO: 30 PG (ref 27–31)
MCHC RBC AUTO-ENTMCNC: 33 G/DL (ref 31–36)
MCV RBC AUTO: 90 FL (ref 80–94)
POTASSIUM SERPL-SCNC: 4.3 MMOL/L (ref 3.5–5)
PROT SERPL-MCNC: 6 G/DL (ref 6.4–8.9)
RBC # BLD AUTO: 3.16 10^6/UL (ref 4–5.4)
SODIUM SERPL-SCNC: 129 MMOL/L (ref 133–145)
WBC # BLD AUTO: 10.4 10^3/UL (ref 3.5–10.8)

## 2017-12-08 RX ADMIN — VANCOMYCIN HYDROCHLORIDE SCH: 1 INJECTION, POWDER, LYOPHILIZED, FOR SOLUTION INTRAVENOUS at 15:09

## 2017-12-08 RX ADMIN — SODIUM CITRATE AND CITRIC ACID MONOHYDRATE SCH ML: 500; 334 SOLUTION ORAL at 08:02

## 2017-12-08 RX ADMIN — RIFAXIMIN SCH MG: 550 TABLET ORAL at 07:58

## 2017-12-08 RX ADMIN — RIFAXIMIN SCH MG: 550 TABLET ORAL at 20:53

## 2017-12-08 RX ADMIN — MIDODRINE HYDROCHLORIDE SCH MG: 5 TABLET ORAL at 20:54

## 2017-12-08 RX ADMIN — NYSTATIN SCH APPLIC: 100000 POWDER TOPICAL at 07:59

## 2017-12-08 RX ADMIN — ATORVASTATIN CALCIUM SCH MG: 10 TABLET, FILM COATED ORAL at 16:23

## 2017-12-08 RX ADMIN — NYSTATIN SCH APPLIC: 100000 POWDER TOPICAL at 20:54

## 2017-12-08 RX ADMIN — Medication SCH CAP: at 07:59

## 2017-12-08 RX ADMIN — Medication SCH APPLIC: at 20:54

## 2017-12-08 RX ADMIN — OMEPRAZOLE SCH MG: 20 CAPSULE, DELAYED RELEASE ORAL at 05:04

## 2017-12-08 RX ADMIN — MIDODRINE HYDROCHLORIDE SCH MG: 5 TABLET ORAL at 15:08

## 2017-12-08 RX ADMIN — MENTHOL AND BENZOCAINE PRN LOZ: 10; 6 LOZENGE ORAL at 20:54

## 2017-12-08 RX ADMIN — HEPARIN SODIUM SCH UNITS: 5000 INJECTION INTRAVENOUS; SUBCUTANEOUS at 20:54

## 2017-12-08 RX ADMIN — NYSTATIN SCH APPLIC: 100000 POWDER TOPICAL at 15:08

## 2017-12-08 RX ADMIN — GUAIFENESIN SCH MG: 600 TABLET, EXTENDED RELEASE ORAL at 20:53

## 2017-12-08 RX ADMIN — MIDODRINE HYDROCHLORIDE SCH MG: 5 TABLET ORAL at 07:57

## 2017-12-08 RX ADMIN — GUAIFENESIN SCH MG: 600 TABLET, EXTENDED RELEASE ORAL at 07:59

## 2017-12-08 RX ADMIN — MENTHOL AND BENZOCAINE PRN LOZ: 10; 6 LOZENGE ORAL at 03:43

## 2017-12-08 RX ADMIN — MENTHOL AND BENZOCAINE PRN LOZ: 10; 6 LOZENGE ORAL at 10:37

## 2017-12-08 RX ADMIN — Medication SCH NOTE: at 05:28

## 2017-12-08 RX ADMIN — HEPARIN SODIUM SCH: 5000 INJECTION INTRAVENOUS; SUBCUTANEOUS at 21:00

## 2017-12-08 RX ADMIN — HEPARIN SODIUM SCH UNITS: 5000 INJECTION INTRAVENOUS; SUBCUTANEOUS at 07:58

## 2017-12-08 RX ADMIN — Medication SCH APPLIC: at 07:59

## 2017-12-08 RX ADMIN — SODIUM CITRATE AND CITRIC ACID MONOHYDRATE SCH ML: 500; 334 SOLUTION ORAL at 20:53

## 2017-12-08 NOTE — PN
Subjective


Date of Service: 12/08/17


Interval History: 





This is a 63 yo male with multiple comorbid conditions originally admitted for 

cellulitis and associated sepsis as a complication of a chronic leg wound.  He 

developed acute on chronic kidney injury and was started on HD.  Patient then 

developed MRSA bacteremia and HD catheter was removed.  Cultures as of 12/2 

have been negative and patient had HD catheter replaced.  





Patient denies any acute concerns today.  He is evaluated postdialysis and 

states he is fatigued.  Otherwise, denies CP or SOB.  He has a chronic cough.  

No abd pain, n/v. 


Family History: Unchanged from Admission


Social History: Unchanged from Admission


Past Medical History: Unchanged from Admission





Objective


Active Medications: 








Acetaminophen (Tylenol Tab*)  650 mg PO Q4H PRN


   PRN Reason: PAIN


Atorvastatin Calcium (Lipitor*)  10 mg PO 1700 [facility]


   Last Admin: 12/08/17 16:23 Dose:  10 mg


Benzonatate (Tessalon Cap*)  100 mg PO BID PRN


   PRN Reason: COUGH


   Last Admin: 12/07/17 13:31 Dose:  100 mg


Citric Acid/Sodium Citrate (Bicitra*)  15 ml PO BID [facility]


   Last Admin: 12/08/17 08:02 Dose:  15 ml


Diphenhydramine HCl (Benadryl Po*)  25 mg PO Q6H PRN


   PRN Reason: PRURITIS


   Last Admin: 12/06/17 05:22 Dose:  25 mg


Guaifenesin (Mucinex*)  1,200 mg PO BID [facility]


   Last Admin: 12/08/17 07:59 Dose:  1,200 mg


Heparin Sodium (Porcine) (Heparin Vial(*))  5,000 units SUBCUT Q12HR [facility]


   Last Admin: 12/08/17 07:58 Dose:  5,000 units


Hydrocortisone (Hydrocortisone 0.5% Oint*)  1 applic TOPICAL BID [facility]


   Stop: 12/13/17 21:00


   Last Admin: 12/08/17 07:59 Dose:  1 applic


Vancomycin HCl 1,500 mg/ (Sodium Chloride)  500 mls @ 250 mls/hr IVPB [facility]@

1600 [facility]


   Last Admin: 12/08/17 15:09 Dose:  Not Given


Levofloxacin/Dextrose (Levaquin 500 Mg Ivpremix(*))  500 mg in 100 mls @ 100 mls

/hr IVPB Q48HR [facility]


Lactobacillus Rhamnosus (Culturelle*)  1 cap PO DAILY [facility]


   Last Admin: 12/08/17 07:59 Dose:  1 cap


Loperamide HCl (Imodium Cap*)  2 mg PO QID PRN


   PRN Reason: DIARRHEA


Midodrine (Midodrine (Nf))  10 mg PO TID [facility]


   PRN Reason: Protocol


   Last Admin: 12/08/17 15:08 Dose:  10 mg


Nystatin (Nystatin Top Powder*)  1 applic TOPICAL TID [facility]


   Last Admin: 12/08/17 15:08 Dose:  1 applic


Omeprazole (Prilosec Cap*)  20 mg PO 0600 [facility]


   Last Admin: 12/08/17 05:04 Dose:  20 mg


Ondansetron HCl (Zofran Inj*)  4 mg IV Q4H PRN


   PRN Reason: NAUSEA


   Last Admin: 12/07/17 13:31 Dose:  4 mg


Pharmacy Consult (Vancomycin Random Level*)  1 note FOLLOW UP [facility]@0600 [facility]


   Last Admin: 12/08/17 05:28 Dose:  1 note


Rifaximin (Xifaxan*)  550 mg PO BID [facility]


   Last Admin: 12/08/17 07:58 Dose:  550 mg


Throat Lozenges (Chloraseptic Kimberly*)  1 kimberly PO Q6H PRN


   PRN Reason: SORE THROAT


   Last Admin: 12/08/17 10:37 Dose:  1 kimberly








 Vital Signs - 8 hr











  12/08/17





  15:55


 


Temperature 98.5 F


 


Pulse Rate 105


 


Respiratory 20





Rate 


 


Blood Pressure 117/67





(mmHg) 


 


O2 Sat by Pulse 98





Oximetry 











Oxygen Devices in Use Now: None


Appearance: 63 yo male who appears older than stated age and slightly fatigued 

in NAD


Respiratory: Symmetrical Chest Expansion and Respiratory Effort, Clear to 

Auscultation


Cardiovascular: NL Sounds; No Murmurs; No JVD, RRR


Abdominal: NL Sounds; No Tenderness; No Distention


Extremities: - - 1-2+ LE edema


Skin: - - diffuse evidence of excoriation


Neurological: Alert and Oriented x 3


Result Diagrams: 


 12/08/17 05:10





 12/08/17 05:10


Microbiology and Other Data: 


 Microbiology











 11/28/17 16:15 Catheter Tip Culture - Preliminary





 Catheter Tip    MRSA














Assess/Plan/Problems-Billing


Assessment: This is a 63 yo male who is morbidly obese with CKD, CHF, afib, HTN 

and HLD who developed acute on chronic kidney injury following sepsis, now 

requiring HD, complicated by infected HD catheter with MRSA bacteremia.





- Patient Problems


(1) MRSA bacteremia


Comment: 


MRSA bactermia is secondary to dialysis catheter infection. Pt refuses MATT. 


Repeat blood cultures clear as of 12/2


Cont vanco x 6 weeks from 12/2   





(2) Acute on chronic kidney failure


Comment: 


HD MoWEFr for CKD that may improve.  Patient given midrodrine for associated 

hypotension. Patient still making urine. Noted improving trend in Cr.


Appreciate Nephrology consult


Reviewed plan of care with Dr Hernandez who suggested continuing HD on an acute 

basis


Dialysis catheter previously infected, temporary catheter replaced after blood 

cultures cleared


   





(3) UTI (urinary tract infection)


Comment: 


c/o dysuria


Urine cx growing Proteus, only 50-75K colonies


Started on Levaquin   





(4) Hypotension


Comment: 


Stable, improved with midrodrine


   





(5) Pruritus


Comment: 


Suspect from uremia, somwehat improved. Benadryl PRN helpful. Will try 

hydrocortisone cream for a short time due to concerns about slowing wound 

healing.


Tbili noted to be elevated


Will assess GB US   





(6) CHF (congestive heart failure)


Comment: 


Mostly RV failure, improved LV fxn on recent echo


Stable, no evidence of acute exacerbation   





(7) Cellulitis


Comment: 


Patient has chronic leg wound with chronic venous stasis changes. 


Continue Vanco for 6 week course.


Wash leg daily with soap and water. 


Cover with gauze and ACE wraps.


   





(8) Morbid obesity


Comment: 


BMI 43.7


Significant weight loss during hospitalization   





(9) Afib


Comment: 


Rate controlled, not currently anticoagulated


Eliquis on hold d/t renal function.  


Patient has documented allergy to warfarin.   





(10) DVT prophylaxis


Comment: 


Sub q heparin   


Status and Disposition: 





Inpatient.  Anticipate dc to [facility] Tuesday

## 2017-12-08 NOTE — PN
Progress Note





- Progress Note


Date of Service: 12/08/17


SOAP: 


Subjective:


CC: bacteremia


HPI: 61 yo man with R chest HD catheter, midline catheter and fever; on 

vancomycin after HD for BSI.  No fever, rash, or diarrhea.  Has dysuria no 

frequency.  





Objective:


[]





 Vital Signs











Temp  37.0 C   12/08/17 07:45


 


Pulse  100   12/08/17 07:45


 


Resp  18   12/08/17 08:00


 


BP  108/57   12/08/17 07:45


 


Pulse Ox  97   12/08/17 07:45








 Intake & Output











 12/07/17 12/08/17 12/08/17





 18:59 06:59 18:59


 


Intake Total 650 120 


 


Output Total 300 160 


 


Balance 350 -40 


 


Weight  331 lb 9.6 oz 


 


Intake:   


 


  Oral 650 120 


 


Output:   


 


  Urine 300 160 


 


Other:   


 


  Estimated Void Small Large 


 


  # Bowel Movements 1 0 


 


  Estimated Stool Amount Medium Large 


 


  # Voids  1 








 


Gen:awake no distress


HEENT:PERRL, MMM


Neck:Supple


Heart:RRR no murmur


Lungs:CTA BL


Abd:+BS NTND soft


Skin: no rash





 Laboratory Results - last 24 hr











  12/08/17 12/08/17





  05:10 05:10


 


WBC  10.4 


 


RBC  3.16 L 


 


Hgb  9.4 L 


 


Hct  28 L 


 


MCV  90 


 


MCH  30 


 


MCHC  33 


 


RDW  19 H 


 


Plt Count  353 


 


MPV  7 L 


 


Neut % (Auto)  66.0 


 


Lymph % (Auto)  8.3 L 


 


Mono % (Auto)  15.7 H 


 


Eos % (Auto)  7.1 H 


 


Baso % (Auto)  2.9 H 


 


Absolute Neuts (auto)  6.9 


 


Absolute Lymphs (auto)  0.9 L 


 


Absolute Monos (auto)  1.6 H 


 


Absolute Eos (auto)  0.7 H 


 


Absolute Basos (auto)  0.3 H 


 


Absolute Nucleated RBC  0 


 


Nucleated RBC %  0 


 


Sodium   129 L


 


Potassium   4.3


 


Chloride   97 L


 


Carbon Dioxide   23


 


Anion Gap   9


 


BUN   66 H


 


Creatinine   5.08 H


 


Est GFR ( Amer)   14.8


 


Est GFR (Non-Af Amer)   11.5


 


BUN/Creatinine Ratio   13.0


 


Glucose   88


 


Calcium   9.1


 


Total Bilirubin   3.60 H


 


AST   18


 


ALT   7


 


Alkaline Phosphatase   175 H


 


Total Protein   6.0 L


 


Albumin   2.4 L


 


Globulin   3.6


 


Albumin/Globulin Ratio   0.7 L


 


Vancomycin Trough   23.1








 





Assessment:


1. MRSA bacteremia; TTE negative and no peripheral stigmata of infective 

endocarditis so it is unlikely


2. ESRD, hemodialysis


3. morbid obesity


4. diabetes with nephropathy








Plan:


1. Vancomycin post HD day 6/14 since negative blood culture


2. levaquin 500 mg po Q48hrs while awaiting culture and susceptibility


35 minutes floor time >50% face to face discussing antibiotic plans.

## 2017-12-09 RX ADMIN — HEPARIN SODIUM SCH: 5000 INJECTION INTRAVENOUS; SUBCUTANEOUS at 12:20

## 2017-12-09 RX ADMIN — Medication SCH APPLIC: at 21:53

## 2017-12-09 RX ADMIN — Medication SCH CAP: at 12:14

## 2017-12-09 RX ADMIN — MENTHOL AND BENZOCAINE PRN LOZ: 10; 6 LOZENGE ORAL at 14:59

## 2017-12-09 RX ADMIN — Medication SCH APPLIC: at 12:24

## 2017-12-09 RX ADMIN — SODIUM CITRATE AND CITRIC ACID MONOHYDRATE SCH: 500; 334 SOLUTION ORAL at 12:24

## 2017-12-09 RX ADMIN — MENTHOL AND BENZOCAINE PRN LOZ: 10; 6 LOZENGE ORAL at 21:52

## 2017-12-09 RX ADMIN — NYSTATIN SCH APPLIC: 100000 POWDER TOPICAL at 12:15

## 2017-12-09 RX ADMIN — NYSTATIN SCH APPLIC: 100000 POWDER TOPICAL at 14:54

## 2017-12-09 RX ADMIN — OMEPRAZOLE SCH MG: 20 CAPSULE, DELAYED RELEASE ORAL at 04:41

## 2017-12-09 RX ADMIN — ATORVASTATIN CALCIUM SCH MG: 10 TABLET, FILM COATED ORAL at 17:13

## 2017-12-09 RX ADMIN — HEPARIN SODIUM SCH: 5000 INJECTION INTRAVENOUS; SUBCUTANEOUS at 21:55

## 2017-12-09 RX ADMIN — MIDODRINE HYDROCHLORIDE SCH MG: 5 TABLET ORAL at 14:54

## 2017-12-09 RX ADMIN — MENTHOL AND BENZOCAINE PRN LOZ: 10; 6 LOZENGE ORAL at 12:13

## 2017-12-09 RX ADMIN — SODIUM CITRATE AND CITRIC ACID MONOHYDRATE SCH ML: 500; 334 SOLUTION ORAL at 21:44

## 2017-12-09 RX ADMIN — RIFAXIMIN SCH MG: 550 TABLET ORAL at 12:14

## 2017-12-09 RX ADMIN — HEPARIN SODIUM SCH UNITS: 5000 INJECTION INTRAVENOUS; SUBCUTANEOUS at 12:16

## 2017-12-09 RX ADMIN — GUAIFENESIN SCH MG: 600 TABLET, EXTENDED RELEASE ORAL at 12:14

## 2017-12-09 RX ADMIN — LOPERAMIDE HYDROCHLORIDE PRN MG: 2 CAPSULE ORAL at 21:45

## 2017-12-09 RX ADMIN — MIDODRINE HYDROCHLORIDE SCH MG: 5 TABLET ORAL at 12:13

## 2017-12-09 RX ADMIN — RIFAXIMIN SCH MG: 550 TABLET ORAL at 21:46

## 2017-12-09 RX ADMIN — HEPARIN SODIUM SCH UNITS: 5000 INJECTION INTRAVENOUS; SUBCUTANEOUS at 21:53

## 2017-12-09 RX ADMIN — MENTHOL AND BENZOCAINE PRN LOZ: 10; 6 LOZENGE ORAL at 17:15

## 2017-12-09 RX ADMIN — DIPHENHYDRAMINE HYDROCHLORIDE PRN MG: 25 CAPSULE ORAL at 21:46

## 2017-12-09 RX ADMIN — NYSTATIN SCH APPLIC: 100000 POWDER TOPICAL at 21:53

## 2017-12-09 RX ADMIN — BENZONATATE PRN MG: 100 CAPSULE ORAL at 12:13

## 2017-12-09 RX ADMIN — MIDODRINE HYDROCHLORIDE SCH MG: 5 TABLET ORAL at 21:45

## 2017-12-09 RX ADMIN — GUAIFENESIN SCH MG: 600 TABLET, EXTENDED RELEASE ORAL at 21:44

## 2017-12-09 NOTE — PN
Subjective


Date of Service: 12/09/17


Interval History: 





Patient reports some improvement in dysuria since Levaquin was initiated 

yesterday.  No c/o abd pain, n/v. No SOB, occasional cough which he reports is 

chronic.


Family History: Unchanged from Admission


Social History: Unchanged from Admission


Past Medical History: Unchanged from Admission





Objective


Active Medications: 








Acetaminophen (Tylenol Tab*)  650 mg PO Q4H PRN


   PRN Reason: PAIN


Atorvastatin Calcium (Lipitor*)  10 mg PO 1700 [facility]


   Last Admin: 12/08/17 16:23 Dose:  10 mg


Benzonatate (Tessalon Cap*)  100 mg PO BID PRN


   PRN Reason: COUGH


   Last Admin: 12/09/17 12:13 Dose:  100 mg


Citric Acid/Sodium Citrate (Bicitra*)  15 ml PO BID [facility]


   Last Admin: 12/09/17 12:24 Dose:  Not Given


Diphenhydramine HCl (Benadryl Po*)  25 mg PO Q6H PRN


   PRN Reason: PRURITIS


   Last Admin: 12/06/17 05:22 Dose:  25 mg


Guaifenesin (Mucinex*)  1,200 mg PO BID [facility]


   Last Admin: 12/09/17 12:14 Dose:  1,200 mg


Heparin Sodium (Porcine) (Heparin Vial(*))  5,000 units SUBCUT Q12HR [facility]


   Last Admin: 12/09/17 12:20 Dose:  Not Given


Hydrocortisone (Hydrocortisone 0.5% Oint*)  1 applic TOPICAL BID [facility]


   Stop: 12/13/17 21:00


   Last Admin: 12/09/17 12:24 Dose:  1 applic


Vancomycin HCl 1,500 mg/ (Sodium Chloride)  500 mls @ 250 mls/hr IVPB [facility]@

1600 [facility]


   Last Admin: 12/08/17 15:09 Dose:  Not Given


Levofloxacin/Dextrose (Levaquin 500 Mg Ivpremix(*))  500 mg in 100 mls @ 100 mls

/hr IVPB Q48HR [facility]


Vancomycin HCl 1,500 mg/ (Sodium Chloride)  500 mls @ 333.333 mls/hr IVPB ONCE 

ONE


   Stop: 12/09/17 15:29


Lactobacillus Rhamnosus (Culturelle*)  1 cap PO DAILY [facility]


   Last Admin: 12/09/17 12:14 Dose:  1 cap


Loperamide HCl (Imodium Cap*)  2 mg PO QID PRN


   PRN Reason: DIARRHEA


Midodrine (Midodrine (Nf))  10 mg PO TID [facility]


   PRN Reason: Protocol


   Last Admin: 12/09/17 12:13 Dose:  10 mg


Nystatin (Nystatin Top Powder*)  1 applic TOPICAL TID [facility]


   Last Admin: 12/09/17 12:15 Dose:  1 applic


Omeprazole (Prilosec Cap*)  20 mg PO 0600 [facility]


   Last Admin: 12/09/17 04:41 Dose:  20 mg


Ondansetron HCl (Zofran Inj*)  4 mg IV Q4H PRN


   PRN Reason: NAUSEA


   Last Admin: 12/07/17 13:31 Dose:  4 mg


Pharmacy Consult (Vancomycin Random Level*)  1 note FOLLOW UP [facility]@0600 [facility]


   Last Admin: 12/08/17 05:28 Dose:  1 note


Rifaximin (Xifaxan*)  550 mg PO BID [facility]


   Last Admin: 12/09/17 12:14 Dose:  550 mg


Throat Lozenges (Chloraseptic Kimberly*)  1 kimberly PO Q6H PRN


   PRN Reason: SORE THROAT


   Last Admin: 12/09/17 12:13 Dose:  1 kimberly








 Vital Signs - 8 hr











  12/09/17





  13:22


 


Temperature 98 F


 


Pulse Rate 108


 


Respiratory 18





Rate 


 


Blood Pressure 100/57





(mmHg) 


 


O2 Sat by Pulse 96





Oximetry 











Oxygen Devices in Use Now: None


Appearance: Chronically ill appearing 63 yo male in NAD


Respiratory: Symmetrical Chest Expansion and Respiratory Effort, Clear to 

Auscultation


Cardiovascular: NL Sounds; No Murmurs; No JVD, RRR


Abdominal: NL Sounds; No Tenderness; No Distention


Extremities: - - 1-2+ non-pitting edema


Skin: - - diffuse excoriation


Neurological: Alert and Oriented x 3


Result Diagrams: 


 12/08/17 05:10





 12/08/17 05:10


Microbiology and Other Data: 


 Microbiology











 11/28/17 16:15 Catheter Tip Culture - Preliminary





 Catheter Tip    MRSA














Assess/Plan/Problems-Billing


Assessment: This is a 63 yo male who is morbidly obese with CKD, CHF, afib, HTN 

and HLD who developed acute on chronic kidney injury following sepsis, now 

requiring HD, complicated by infected HD catheter with MRSA bacteremia.





- Patient Problems


(1) MRSA bacteremia


Comment: 


MRSA bactermia is secondary to dialysis catheter infection. Pt refuses MATT. 


Repeat blood cultures clear as of 12/2


Cont vanco x 6 weeks from 12/2 (through Jan 13)   





(2) Acute on chronic kidney failure


Comment: 


HD MoWEFr for CKD that may improve.  Patient given midrodrine for associated 

hypotension. Patient still making urine. Noted improving trend in Cr.


Appreciate Nephrology consult


Reviewed plan of care with Dr Hernandez 12/8 who suggested continuing HD on an 

acute basis


Dialysis catheter previously infected, temporary catheter replaced after blood 

cultures cleared


   





(3) UTI (urinary tract infection)


Comment: 


c/o dysuria


Urine cx growing Proteus, only 50-75K colonies


Started on Levaquin with symptomatic improvement although Proteus shows only 

intermediate sensitivity to


Patient is otherwise allergic to PCN, cephalosporins and sulfa drugs, leaving 

only gentamicin and meropenem as options for treatment


Will plan to cont Levaquin at this time given his positive treatment response   





(4) Hypotension


Comment: 


Stable, improved with midrodrine


   





(5) Pruritus


Comment: 


Suspect from uremia, somwehat improved. Benadryl PRN helpful. 


Tbili noted to be elevated


GB not visualized on US, patient denies cholecystectomy, biliary system appears 

benign on US with small amount of associated ascites


Elevated Tbili may be due to mild hepatic congestion due to his R heart failure

, will repeat CMP tomorrow.   





(6) CHF (congestive heart failure)


Comment: 


Mostly RV failure, improved LV fxn on recent echo


Stable, no evidence of acute exacerbation   





(7) Cellulitis


Comment: 


Patient has chronic leg wound with chronic venous stasis changes. 


Continue Vanco for 6 week course.


Wash leg daily with soap and water. 


Cover with gauze and ACE wraps.


   





(8) Morbid obesity


Comment: 


BMI 43.7


Significant weight loss during hospitalization   





(9) Afib


Comment: 


Rate controlled, not currently anticoagulated


Eliquis on hold d/t renal function.  


Patient has documented allergy to warfarin.   





(10) DVT prophylaxis


Comment: 


Sub q heparin   


Status and Disposition: 





Inpatient.  Anticipate dc to [facility] Tuesday

## 2017-12-09 NOTE — RAD
Indication: Elevated bilirubin.



Comparison: No relevant prior exams available on the [facility] for comparison.



Technique: RIGHT upper quadrant ultrasound.



Report: Appropriate direction flow documented in the portal vein.



16.4 cm liver is increased in echogenicity. Negative for focal hepatic lesions. Negative

for intrahepatic biliary dilatation. Upper normal 6.9 mm common bile duct.



The gallbladder is not visualized. Patient uncertain if previous cholecystectomy. 



The pancreas is obscured secondary to bowel gas and body habitus limiting assessment. 



Small volume of perihepatic ascites.



11.7 cm RIGHT kidney is unremarkable. 



IMPRESSION: 

1. Heterogeneous increased hepatic echogenicity consistent with fatty infiltration.

2. The gallbladder is not visualized. Patient uncertain if previous cholecystectomy. No

relevant prior exams available on the [facility] to assess for prior cholecystectomy.

3. Upper normal diameter of the common bile duct. Negative for intrahepatic biliary

dilatation.

4. Small volume of perihepatic ascites.

## 2017-12-10 LAB
ALBUMIN SERPL BCG-MCNC: 2.3 G/DL (ref 3.2–5.2)
ALP SERPL-CCNC: 188 U/L (ref 34–104)
ALT SERPL W P-5'-P-CCNC: 8 U/L (ref 7–52)
ANION GAP SERPL CALC-SCNC: 11 MMOL/L (ref 2–11)
AST SERPL-CCNC: 21 U/L (ref 13–39)
BUN SERPL-MCNC: 58 MG/DL (ref 6–24)
BUN/CREAT SERPL: 13.1 (ref 8–20)
CALCIUM SERPL-MCNC: 8.6 MG/DL (ref 8.6–10.3)
CHLORIDE SERPL-SCNC: 101 MMOL/L (ref 101–111)
GLOBULIN SER CALC-MCNC: 3.7 G/DL (ref 2–4)
GLUCOSE SERPL-MCNC: 102 MG/DL (ref 70–100)
HCO3 SERPL-SCNC: 20 MMOL/L (ref 22–32)
POTASSIUM SERPL-SCNC: 4.3 MMOL/L (ref 3.5–5)
PROT SERPL-MCNC: 6 G/DL (ref 6.4–8.9)
SODIUM SERPL-SCNC: 132 MMOL/L (ref 133–145)

## 2017-12-10 RX ADMIN — RIFAXIMIN SCH MG: 550 TABLET ORAL at 08:35

## 2017-12-10 RX ADMIN — Medication SCH: at 23:19

## 2017-12-10 RX ADMIN — HEPARIN SODIUM SCH: 5000 INJECTION INTRAVENOUS; SUBCUTANEOUS at 08:32

## 2017-12-10 RX ADMIN — SODIUM CITRATE AND CITRIC ACID MONOHYDRATE SCH ML: 500; 334 SOLUTION ORAL at 08:35

## 2017-12-10 RX ADMIN — OMEPRAZOLE SCH MG: 20 CAPSULE, DELAYED RELEASE ORAL at 05:55

## 2017-12-10 RX ADMIN — NYSTATIN SCH APPLIC: 100000 POWDER TOPICAL at 15:34

## 2017-12-10 RX ADMIN — NYSTATIN SCH: 100000 POWDER TOPICAL at 23:19

## 2017-12-10 RX ADMIN — GUAIFENESIN SCH MG: 600 TABLET, EXTENDED RELEASE ORAL at 23:10

## 2017-12-10 RX ADMIN — MENTHOL AND BENZOCAINE SCH LOZ: 10; 6 LOZENGE ORAL at 23:12

## 2017-12-10 RX ADMIN — MIDODRINE HYDROCHLORIDE SCH MG: 5 TABLET ORAL at 15:34

## 2017-12-10 RX ADMIN — MENTHOL AND BENZOCAINE SCH LOZ: 10; 6 LOZENGE ORAL at 23:09

## 2017-12-10 RX ADMIN — MIDODRINE HYDROCHLORIDE SCH MG: 5 TABLET ORAL at 08:35

## 2017-12-10 RX ADMIN — MENTHOL AND BENZOCAINE SCH LOZ: 10; 6 LOZENGE ORAL at 17:17

## 2017-12-10 RX ADMIN — MENTHOL AND BENZOCAINE PRN LOZ: 10; 6 LOZENGE ORAL at 01:06

## 2017-12-10 RX ADMIN — MIDODRINE HYDROCHLORIDE SCH MG: 5 TABLET ORAL at 23:09

## 2017-12-10 RX ADMIN — NYSTATIN SCH APPLIC: 100000 POWDER TOPICAL at 08:38

## 2017-12-10 RX ADMIN — Medication SCH APPLIC: at 08:39

## 2017-12-10 RX ADMIN — Medication SCH CAP: at 08:35

## 2017-12-10 RX ADMIN — HEPARIN SODIUM SCH: 5000 INJECTION INTRAVENOUS; SUBCUTANEOUS at 22:11

## 2017-12-10 RX ADMIN — GUAIFENESIN SCH MG: 600 TABLET, EXTENDED RELEASE ORAL at 08:35

## 2017-12-10 RX ADMIN — SODIUM CITRATE AND CITRIC ACID MONOHYDRATE SCH: 500; 334 SOLUTION ORAL at 22:16

## 2017-12-10 RX ADMIN — RIFAXIMIN SCH MG: 550 TABLET ORAL at 23:10

## 2017-12-10 RX ADMIN — MENTHOL AND BENZOCAINE SCH LOZ: 10; 6 LOZENGE ORAL at 13:23

## 2017-12-10 RX ADMIN — ATORVASTATIN CALCIUM SCH MG: 10 TABLET, FILM COATED ORAL at 17:16

## 2017-12-10 NOTE — PN
Subjective


Date of Service: 12/10/17


Interval History: 





Patient offers no new complaints today.  Still has a freq cough.  Improved with 

cough drops.  No dyspnea.


Family History: Unchanged from Admission


Social History: Unchanged from Admission


Past Medical History: Unchanged from Admission





Objective


Active Medications: 








Acetaminophen (Tylenol Tab*)  650 mg PO Q4H PRN


   PRN Reason: PAIN


Atorvastatin Calcium (Lipitor*)  10 mg PO 1700 [facility]


   Last Admin: 12/09/17 17:13 Dose:  10 mg


Benzonatate (Tessalon Cap*)  100 mg PO BID PRN


   PRN Reason: COUGH


   Last Admin: 12/09/17 12:13 Dose:  100 mg


Citric Acid/Sodium Citrate (Bicitra*)  15 ml PO BID [facility]


   Last Admin: 12/10/17 08:35 Dose:  15 ml


Diphenhydramine HCl (Benadryl Po*)  25 mg PO Q6H PRN


   PRN Reason: PRURITIS


   Last Admin: 12/09/17 21:46 Dose:  25 mg


Guaifenesin (Mucinex*)  1,200 mg PO BID [facility]


   Last Admin: 12/10/17 08:35 Dose:  1,200 mg


Heparin Sodium (Porcine) (Heparin Vial(*))  5,000 units SUBCUT Q12HR [facility]


   Last Admin: 12/10/17 08:32 Dose:  Not Given


Hydrocortisone (Hydrocortisone 0.5% Oint*)  1 applic TOPICAL BID [facility]


   Stop: 12/13/17 21:00


   Last Admin: 12/10/17 08:39 Dose:  1 applic


Vancomycin HCl 1,500 mg/ (Sodium Chloride)  500 mls @ 250 mls/hr IVPB [facility]@

1600 [facility]


   Last Admin: 12/08/17 15:09 Dose:  Not Given


Levofloxacin/Dextrose (Levaquin 500 Mg Ivpremix(*))  500 mg in 100 mls @ 100 mls

/hr IVPB Q48HR [facility]


   Last Admin: 12/10/17 08:35 Dose:  100 mls/hr


Lactobacillus Rhamnosus (Culturelle*)  1 cap PO DAILY [facility]


   Last Admin: 12/10/17 08:35 Dose:  1 cap


Loperamide HCl (Imodium Cap*)  2 mg PO QID PRN


   PRN Reason: DIARRHEA


   Last Admin: 12/09/17 21:45 Dose:  2 mg


Midodrine (Midodrine (Nf))  10 mg PO TID [facility]


   PRN Reason: Protocol


   Last Admin: 12/10/17 08:35 Dose:  10 mg


Nystatin (Nystatin Top Powder*)  1 applic TOPICAL TID [facility]


   Last Admin: 12/10/17 08:38 Dose:  1 applic


Omeprazole (Prilosec Cap*)  20 mg PO 0600 [facility]


   Last Admin: 12/10/17 05:55 Dose:  20 mg


Ondansetron HCl (Zofran Inj*)  4 mg IV Q4H PRN


   PRN Reason: NAUSEA


   Last Admin: 12/07/17 13:31 Dose:  4 mg


Pharmacy Consult (Vancomycin Random Level*)  1 note FOLLOW UP [facility]@0600 [facility]


   Last Admin: 12/08/17 05:28 Dose:  1 note


Rifaximin (Xifaxan*)  550 mg PO BID [facility]


   Last Admin: 12/10/17 08:35 Dose:  550 mg








 Vital Signs - 8 hr











  12/10/17 12/10/17





  07:59 08:25


 


Temperature  98.2 F


 


Pulse Rate  101


 


Respiratory 18 20





Rate  


 


Blood Pressure  111/59





(mmHg)  


 


O2 Sat by Pulse  97





Oximetry  











Oxygen Devices in Use Now: None


Appearance: Chronically ill appearing 63 yo male in NAD.


Respiratory: Symmetrical Chest Expansion and Respiratory Effort, Clear to 

Auscultation


Cardiovascular: NL Sounds; No Murmurs; No JVD, RRR


Extremities: - - 1-2+ non-pitting edema


Skin: - - diffuse excoriation


Neurological: Alert and Oriented x 3


Result Diagrams: 


 12/08/17 05:10





 12/10/17 06:25


Microbiology and Other Data: 


 Microbiology











 11/28/17 16:15 Catheter Tip Culture - Preliminary





 Catheter Tip    MRSA














Assess/Plan/Problems-Billing


Assessment: This is a 63 yo male who is morbidly obese with CKD, CHF, afib, HTN 

and HLD who developed acute on chronic kidney injury following sepsis, now 

requiring HD, complicated by infected HD catheter with MRSA bacteremia.





- Patient Problems


(1) MRSA bacteremia


Comment: 


MRSA bactermia is secondary to dialysis catheter infection. Pt refuses MATT. 


Repeat blood cultures clear as of 12/2


Cont vanco x 6 weeks from 12/2 (through Jan 13)   





(2) Acute on chronic kidney failure


Comment: 


HD MoWEFr for CKD that may improve.  Patient given midrodrine for associated 

hypotension. Patient still making urine. Noted improving trend in Cr.


Appreciate Nephrology consult


Reviewed plan of care with Dr Hernandez 12/8 who suggested continuing HD on an 

acute basis


Dialysis catheter previously infected, temporary catheter replaced after blood 

cultures cleared


   





(3) UTI (urinary tract infection)


Comment: 


c/o dysuria


Urine cx growing Proteus, only 50-75K colonies


Started on Levaquin with symptomatic improvement although Proteus shows only 

intermediate sensitivity to


Patient is otherwise allergic to PCN, cephalosporins and sulfa drugs, leaving 

only gentamicin and meropenem as options for treatment


Will plan to cont Levaquin at this time given his positive treatment response   





(4) Hypotension


Comment: 


Stable, improved with midrodrine


   





(5) Pruritus


Comment: 


Suspect from uremia, somwehat improved. Benadryl PRN helpful. 


Tbili noted to be elevated


GB not visualized on US, patient denies cholecystectomy, biliary system appears 

benign on US with small amount of associated ascites


Elevated Tbili may be due to mild hepatic congestion due to his R heart failure

, improved on repeat labs without transaminitis   





(6) CHF (congestive heart failure)


Comment: 


Mostly RV failure, improved LV fxn on recent echo


Stable, no evidence of acute exacerbation   





(7) Cellulitis


Comment: 


Patient has chronic leg wound with chronic venous stasis changes. 


Continue Vanco for 6 week course.


Wash leg daily with soap and water. 


Cover with gauze and ACE wraps.


   





(8) Morbid obesity


Comment: 


BMI 43.7


Significant weight loss during hospitalization   





(9) Afib


Comment: 


Rate controlled, not currently anticoagulated


Eliquis on hold d/t renal function.  


Patient has documented allergy to warfarin.   





(10) DVT prophylaxis


Comment: 


Sub q heparin   


Status and Disposition: 





Inpatient.  Anticipate dc to [facility] [place] Tuesday

## 2017-12-11 LAB
ANION GAP SERPL CALC-SCNC: 12 MMOL/L (ref 2–11)
BUN SERPL-MCNC: 64 MG/DL (ref 6–24)
BUN/CREAT SERPL: 13.4 (ref 8–20)
CALCIUM SERPL-MCNC: 9.1 MG/DL (ref 8.6–10.3)
CHLORIDE SERPL-SCNC: 100 MMOL/L (ref 101–111)
GLUCOSE SERPL-MCNC: 94 MG/DL (ref 70–100)
HCO3 SERPL-SCNC: 21 MMOL/L (ref 22–32)
POTASSIUM SERPL-SCNC: 4.4 MMOL/L (ref 3.5–5)
SODIUM SERPL-SCNC: 133 MMOL/L (ref 133–145)

## 2017-12-11 RX ADMIN — MENTHOL AND BENZOCAINE SCH LOZ: 10; 6 LOZENGE ORAL at 20:16

## 2017-12-11 RX ADMIN — MENTHOL AND BENZOCAINE SCH LOZ: 10; 6 LOZENGE ORAL at 08:21

## 2017-12-11 RX ADMIN — Medication SCH APPLIC: at 08:21

## 2017-12-11 RX ADMIN — RIFAXIMIN SCH MG: 550 TABLET ORAL at 20:16

## 2017-12-11 RX ADMIN — OMEPRAZOLE SCH MG: 20 CAPSULE, DELAYED RELEASE ORAL at 05:35

## 2017-12-11 RX ADMIN — NYSTATIN SCH APPLIC: 100000 POWDER TOPICAL at 08:21

## 2017-12-11 RX ADMIN — MENTHOL AND BENZOCAINE SCH LOZ: 10; 6 LOZENGE ORAL at 02:00

## 2017-12-11 RX ADMIN — ATORVASTATIN CALCIUM SCH MG: 10 TABLET, FILM COATED ORAL at 20:16

## 2017-12-11 RX ADMIN — MENTHOL AND BENZOCAINE SCH: 10; 6 LOZENGE ORAL at 08:29

## 2017-12-11 RX ADMIN — Medication SCH NOTE: at 08:22

## 2017-12-11 RX ADMIN — MENTHOL AND BENZOCAINE SCH LOZ: 10; 6 LOZENGE ORAL at 17:04

## 2017-12-11 RX ADMIN — MIDODRINE HYDROCHLORIDE SCH MG: 5 TABLET ORAL at 20:17

## 2017-12-11 RX ADMIN — MENTHOL AND BENZOCAINE SCH LOZ: 10; 6 LOZENGE ORAL at 10:40

## 2017-12-11 RX ADMIN — Medication SCH CAP: at 08:21

## 2017-12-11 RX ADMIN — RIFAXIMIN SCH MG: 550 TABLET ORAL at 08:21

## 2017-12-11 RX ADMIN — SODIUM CITRATE AND CITRIC ACID MONOHYDRATE SCH: 500; 334 SOLUTION ORAL at 08:22

## 2017-12-11 RX ADMIN — MENTHOL AND BENZOCAINE SCH LOZ: 10; 6 LOZENGE ORAL at 13:27

## 2017-12-11 RX ADMIN — GUAIFENESIN SCH MG: 600 TABLET, EXTENDED RELEASE ORAL at 20:16

## 2017-12-11 RX ADMIN — GUAIFENESIN SCH MG: 600 TABLET, EXTENDED RELEASE ORAL at 08:21

## 2017-12-11 RX ADMIN — SODIUM CITRATE AND CITRIC ACID MONOHYDRATE SCH: 500; 334 SOLUTION ORAL at 20:17

## 2017-12-11 RX ADMIN — HEPARIN SODIUM SCH: 5000 INJECTION INTRAVENOUS; SUBCUTANEOUS at 20:17

## 2017-12-11 RX ADMIN — MENTHOL AND BENZOCAINE SCH LOZ: 10; 6 LOZENGE ORAL at 15:47

## 2017-12-11 RX ADMIN — MIDODRINE HYDROCHLORIDE SCH MG: 5 TABLET ORAL at 13:27

## 2017-12-11 RX ADMIN — NYSTATIN SCH APPLIC: 100000 POWDER TOPICAL at 15:47

## 2017-12-11 RX ADMIN — HEPARIN SODIUM SCH: 5000 INJECTION INTRAVENOUS; SUBCUTANEOUS at 07:29

## 2017-12-11 RX ADMIN — MENTHOL AND BENZOCAINE SCH LOZ: 10; 6 LOZENGE ORAL at 05:35

## 2017-12-11 RX ADMIN — Medication SCH: at 20:17

## 2017-12-11 RX ADMIN — VANCOMYCIN HYDROCHLORIDE SCH: 1 INJECTION, POWDER, LYOPHILIZED, FOR SOLUTION INTRAVENOUS at 14:54

## 2017-12-11 RX ADMIN — MIDODRINE HYDROCHLORIDE SCH MG: 5 TABLET ORAL at 08:21

## 2017-12-11 NOTE — PN
Subjective


Date of Service: 12/11/17


Interval History: 





Patient offers no acute complaints.  


Family History: Unchanged from Admission


Social History: Unchanged from Admission


Past Medical History: Unchanged from Admission





Objective


Active Medications: 








Acetaminophen (Tylenol Tab*)  650 mg PO Q4H PRN


   PRN Reason: PAIN


Atorvastatin Calcium (Lipitor*)  10 mg PO 1700 [facility]


   Last Admin: 12/10/17 17:16 Dose:  10 mg


Benzonatate (Tessalon Cap*)  100 mg PO BID PRN


   PRN Reason: COUGH


   Last Admin: 12/09/17 12:13 Dose:  100 mg


Citric Acid/Sodium Citrate (Bicitra*)  15 ml PO BID [facility]


   Last Admin: 12/11/17 08:22 Dose:  Not Given


Diphenhydramine HCl (Benadryl Po*)  25 mg PO Q6H PRN


   PRN Reason: PRURITIS


   Last Admin: 12/09/17 21:46 Dose:  25 mg


Guaifenesin (Mucinex*)  1,200 mg PO BID [facility]


   Last Admin: 12/11/17 08:21 Dose:  1,200 mg


Heparin Sodium (Porcine) (Heparin Vial(*))  5,000 units SUBCUT Q12HR [facility]


   Last Admin: 12/11/17 07:29 Dose:  Not Given


Hydrocortisone (Hydrocortisone 0.5% Oint*)  1 applic TOPICAL BID [facility]


   Stop: 12/13/17 21:00


   Last Admin: 12/11/17 08:21 Dose:  1 applic


Vancomycin HCl 1,500 mg/ (Sodium Chloride)  500 mls @ 250 mls/hr IVPB MoWeFr@

1600 [facility]


   Last Admin: 12/08/17 15:09 Dose:  Not Given


Lactobacillus Rhamnosus (Culturelle*)  1 cap PO DAILY [facility]


   Last Admin: 12/11/17 08:21 Dose:  1 cap


Levofloxacin (Levaquin Tab*)  500 mg PO Q48H [facility]


Loperamide HCl (Imodium Cap*)  2 mg PO QID PRN


   PRN Reason: DIARRHEA


   Last Admin: 12/09/17 21:45 Dose:  2 mg


Midodrine (Midodrine (Nf))  10 mg PO TID [facility]


   PRN Reason: Protocol


   Last Admin: 12/11/17 08:21 Dose:  10 mg


Nystatin (Nystatin Top Powder*)  1 applic TOPICAL TID [facility]


   Last Admin: 12/11/17 08:21 Dose:  1 applic


Omeprazole (Prilosec Cap*)  20 mg PO 0600 [facility]


   Last Admin: 12/11/17 05:35 Dose:  20 mg


Ondansetron HCl (Zofran Inj*)  4 mg IV Q4H PRN


   PRN Reason: NAUSEA


   Last Admin: 12/07/17 13:31 Dose:  4 mg


Pharmacy Consult (Vancomycin Random Level*)  1 note FOLLOW UP MoWeFr@0600 [facility]


   Last Admin: 12/11/17 08:22 Dose:  1 note


Rifaximin (Xifaxan*)  550 mg PO BID [facility]


   Last Admin: 12/11/17 08:21 Dose:  550 mg


Throat Lozenges (Chloraseptic Kimberly*)  1 kimberly PO Q2H [facility]


   Last Admin: 12/11/17 08:29 Dose:  Not Given








 Vital Signs - 8 hr











  12/11/17 12/11/17





  03:30 08:00


 


Temperature 98.5 F 


 


Pulse Rate 101 


 


Respiratory 16 16





Rate  


 


Blood Pressure 101/66 





(mmHg)  


 


O2 Sat by Pulse 97 





Oximetry  











Oxygen Devices in Use Now: None


Appearance: Patient is resting comfortably.  Full exam not completed today


Result Diagrams: 


 12/08/17 05:10





 12/11/17 06:17


Microbiology and Other Data: 


 Microbiology











 11/28/17 16:15 Catheter Tip Culture - Preliminary





 Catheter Tip    MRSA














Assess/Plan/Problems-Billing


Assessment: This is a 63 yo male who is morbidly obese with CKD, CHF, afib, HTN 

and HLD who developed acute on chronic kidney injury following sepsis, now 

requiring HD, complicated by infected HD catheter with MRSA bacteremia.





- Patient Problems


(1) MRSA bacteremia


Comment: 


MRSA bactermia is secondary to dialysis catheter infection. Pt refuses MATT. 


Repeat blood cultures clear as of 12/2


Cont vanco x 6 weeks from 12/2 (through Jan 13)   





(2) Acute on chronic kidney failure


Comment: 


HD MoWEFr for CKD that may improve.  Patient given midrodrine for associated 

hypotension. Patient still making urine. Noted improving trend in Cr.


Appreciate Nephrology consult


Reviewed plan of care with Dr Hernandez 12/8 who suggested continuing HD on an 

acute basis


Dialysis catheter previously infected, temporary catheter replaced after blood 

cultures cleared


   





(3) UTI (urinary tract infection)


Comment: 


c/o dysuria


Urine cx growing Proteus, only 50-75K colonies


Started on Levaquin with symptomatic improvement although Proteus shows only 

intermediate sensitivity to


Patient is otherwise allergic to PCN, cephalosporins and sulfa drugs, leaving 

only gentamicin and meropenem as options for treatment


Will plan to cont Levaquin at this time given his positive treatment response   





(4) Hypotension


Comment: 


Stable, improved with midrodrine


   





(5) Pruritus


Comment: 


Suspect from uremia, somwehat improved. Benadryl PRN helpful. 


Tbili noted to be elevated


GB not visualized on US, patient denies cholecystectomy, biliary system appears 

benign on US with small amount of associated ascites


Elevated Tbili may be due to mild hepatic congestion due to his R heart failure

, improved on repeat labs without transaminitis   





(6) CHF (congestive heart failure)


Comment: 


Mostly RV failure, improved LV fxn on recent echo


Stable, no evidence of acute exacerbation   





(7) Cellulitis


Comment: 


Patient has chronic leg wound with chronic venous stasis changes. 


Continue Vanco for 6 week course.


Wash leg daily with soap and water. 


Cover with gauze and ACE wraps.


   





(8) Morbid obesity


Comment: 


BMI 43.7


Significant weight loss during hospitalization   





(9) Afib


Comment: 


Rate controlled, not currently anticoagulated


Eliquis on hold d/t renal function.  


Patient has documented allergy to warfarin.   





(10) DVT prophylaxis


Comment: 


Sub q heparin   


Status and Disposition: 





Inpatient.  Anticipate dc to [facility] tomorrow

## 2017-12-12 VITALS — SYSTOLIC BLOOD PRESSURE: 112 MMHG | DIASTOLIC BLOOD PRESSURE: 59 MMHG

## 2017-12-12 RX ADMIN — HEPARIN SODIUM SCH: 5000 INJECTION INTRAVENOUS; SUBCUTANEOUS at 08:42

## 2017-12-12 RX ADMIN — MIDODRINE HYDROCHLORIDE SCH MG: 5 TABLET ORAL at 10:48

## 2017-12-12 RX ADMIN — LOPERAMIDE HYDROCHLORIDE PRN MG: 2 CAPSULE ORAL at 05:25

## 2017-12-12 RX ADMIN — Medication SCH: at 10:49

## 2017-12-12 RX ADMIN — Medication SCH CAP: at 08:00

## 2017-12-12 RX ADMIN — GUAIFENESIN SCH MG: 600 TABLET, EXTENDED RELEASE ORAL at 07:55

## 2017-12-12 RX ADMIN — HEPARIN SODIUM SCH UNITS: 5000 INJECTION INTRAVENOUS; SUBCUTANEOUS at 07:56

## 2017-12-12 RX ADMIN — MENTHOL AND BENZOCAINE SCH: 10; 6 LOZENGE ORAL at 04:49

## 2017-12-12 RX ADMIN — OMEPRAZOLE SCH MG: 20 CAPSULE, DELAYED RELEASE ORAL at 05:21

## 2017-12-12 RX ADMIN — MENTHOL AND BENZOCAINE SCH: 10; 6 LOZENGE ORAL at 06:55

## 2017-12-12 RX ADMIN — SODIUM CITRATE AND CITRIC ACID MONOHYDRATE SCH ML: 500; 334 SOLUTION ORAL at 07:56

## 2017-12-12 RX ADMIN — MENTHOL AND BENZOCAINE SCH LOZ: 10; 6 LOZENGE ORAL at 02:37

## 2017-12-12 RX ADMIN — MENTHOL AND BENZOCAINE SCH LOZ: 10; 6 LOZENGE ORAL at 00:18

## 2017-12-12 RX ADMIN — MENTHOL AND BENZOCAINE SCH LOZ: 10; 6 LOZENGE ORAL at 07:55

## 2017-12-12 RX ADMIN — MENTHOL AND BENZOCAINE SCH LOZ: 10; 6 LOZENGE ORAL at 10:47

## 2017-12-12 RX ADMIN — RIFAXIMIN SCH MG: 550 TABLET ORAL at 10:48

## 2017-12-12 RX ADMIN — MENTHOL AND BENZOCAINE SCH LOZ: 10; 6 LOZENGE ORAL at 04:54

## 2017-12-12 RX ADMIN — SODIUM CITRATE AND CITRIC ACID MONOHYDRATE SCH: 500; 334 SOLUTION ORAL at 08:01

## 2017-12-12 NOTE — DS
CC:  Joaquin; Dr. Loza; Dr. Hernandez; Dr. Blanco Ramirez *

 

DATE OF ADMISSION:  10/25/2017.

 

DATE OF DISCHARGE TO [facility]2017.

 

DATE OF READMISSION TO ACUTE CARE STATUS:  2017.

 

DATE OF DISCHARGE TO [facility]:  2017.

 

PRIMARY CARE PHYSICIAN:  Dr. Loza.

 

CONSULTING NEPHROLOGIST:  Dr. Hernandez.

 

CONSULTING INFECTIOUS DISEASE SPECIALIST:  Dr. Blanco Ramirez.

 

DISCHARGING PROVIDER:  AGAPITO Frederick.

 

SUPERVISING PHYSICIAN:  Dr. Leilani Scott * (dictated by AGAPITO Frederick).

 

PRIMARY DISCHARGE DIAGNOSES:

1.  Acute on chronic kidney failure requiring hemodialysis three times weekly 
with some noted improvement in creatinine - temporary dialysis catheter in 
place at the time of discharge.

2.  MRSA bacteremia secondary to infected hemodialysis catheter, status post 
removal and replacement after blood cultures cleared 2017 - cont 
Vancomycin for a total of 6 weeks

3.  Urinary tract infection - uncomplicated, growing proteus, recommend 
Levaquin through .

4.  Hypotension secondary to dialysis, improved with Midodrine.

5.  Pruritis - likely secondary to uremia, somewhat relieved with Benadryl and 
topical Hydrocortisone cream.

6.  Periventricular heart failure with LV improvement to 50 to 55 percent on 
recent echo from 2017 with mild to moderate systolic dysfunction of the 
right ventricle.

7.  Cellulitis of the lower extremity - resolved.

 

SECONDARY DISCHARGE DIAGNOSES:

1.  Morbid obesity with a BMI of 41.6 at the time of discharge.  Of note, the 
patient lost greater than 100 pounds during his hospitalization.

2.  Atrial fibrillation, rate controlled and anticoagulation held due to renal 
function. 



DISCHARGE MEDICATIONS:

1.  Atorvastatin 10 mg p.o. daily.

2.  Chloraseptic Lozenge one lozenge every 2 hours as needed for cough.

3.  Mucinex 1200 mg p.o. twice daily.

4.  Hydrocortisone cream applied topically twice daily.

5.  Lactobacillus one capsule p.o. daily.

6.  Levaquin 500 mg every other day through 2017.

7.  Midodrine 10 mg p.o. 3 times daily.

8.  Prilosec 20 mg p.o. daily.

9.  Rifaximin 550 mg p.o. twice daily.

10. Bicitra 15 ml p.o. twice daily.

11. Vancomycin 1500 mg IV Monday, Wednesday, Friday prior to hemodialysis.

 

Medication changes:  Most medications listed above are new.  The majority of 
this home medications have been discontinued.

 

HOSPITAL IMAGING SINCE 2017:

1.  Chest x-ray, 2017, shows a right-sided central venous catheter, no 
pneumothorax, an enlarged cardiac silhouette with mild interstitial congestion 
which is unchanged from prior.

2.  Gallbladder ultrasound, 2017, shows the gallbladder is nonvisualized, 
fatty infiltration of the liver, common bile duct measuring approximately 7 mm, 
no intrahepatic biliary dilatation, there is a small volume of perihepatic 
ascites noted.

 

HOSPITAL COURSE:  This is a 62-year-old, chronically ill gentleman who was 
originally transferred from [facility] on 10/25/2017 with an 
indeterminate troponin and evidence of acute kidney injury and sepsis secondary 
to lower extremity cellulitis.  Please refer to history and physical from that 
date for further details on this acute presentation.  The patient eventually 
required hemodialysis for treatment of his kidney injury and eventually was 
transitioned to swing status the middle of November.  The patient's swing stay 
unfortunately was complicated by a central line infection.  The patient was 
noted to have MRSA bacteremia with a culture positive tip from his hemodialysis 
catheter. Transthoracic echocardiogram was negative for vegetations at that 
time and patient refused a transesophageal echocardiogram.  He was treated as 
if he had endocarditis for his reason with recommendations for a total of six 
weeks of IV Vancomycin per Infectious Disease specialist, Dr. Armen Ramirez.

 

The patient has been continued on hemodialysis three times weekly with some 
subtle improvements in his creatinine.  He is producing urine at the time of 
discharge. Documented output of approximately 600 to 700 ml on a daily basis.  
The patient will require continued hemodialysis three times weekly on an acute 
basis and does have a temporary catheter in the right IJ at the time of 
discharge.

 

The patient's blood cultures have been clear since 2017.  IV Vancomycin 
will need to be continued for six weeks from the date of cleared blood cultures 
which should take him through 2018 for his Vancomycin infusions.

 

The patient has been complaining of pruritus during his hospital stay.  It was 
thought to most likely be due to uremia and there is some improvement with prn 
Benadryl and topical Hydrocortisone.  Total bilirubin was noted to be elevated, 
but no transaminitis or abdominal pain, nausea or vomiting.  This elevation is 
likely due to congestive hepatopathy and may be contributing to his pruritus.  
Gallbladder ultrasound does not show a significant dilatation of the biliary 
system.  The gallbladder was not visualized and the patient does not recall a 
history of cholecystectomy.

 

The patient was complaining of dysuria.  Urine analysis was suggestive of 
possible infection and culture grew proteus, approximately 50,000 colonies.  He 
was started on Levaquin with some improvement in his dysuria and should 
complete antibiotics through the end of this week, 2017.

 

The patient has sustained a large weight loss during his hospital stay, most 
notable since his admission at the end of October.  The patient is down greater 
than 50 kg.

 

Of note, the patient was intermittently hypotensive, generally associated with 
dialysis.  This has improved since initiation of Midodrine and should be 
continued at discharge.

 

DISPOSITION AND FOLLOW-UP PLANS:  The patient is being discharged to [facility].  The patient requires continued hemodialysis on Monday, 
Wednesday, and Friday with Vancomycin administration prior to hemodialysis and 
Vanco trough levels once weekly prior to Vanco administration.  The patient has 
a hemodialysis catheter present in the right IJ at the time of discharge; that 
should remain in place at this time and replacement can be discussed with Dr. Hernandez depending upon his length of hemodialysis needs.  The patient will need 
to follow- up with Dr. Hernandez in one to two weeks, which can occur during 
dialysis session. Please see medication list above.  At the time of discharge, 
the patient is unable to ambulate and is using a Elma lift for transfer.

 

____________________________________ AGAPITO FREDERICK

 

[id number]/[facility] #: [id number]

[facility]

## 2017-12-13 ENCOUNTER — HOSPITAL ENCOUNTER (EMERGENCY)
Dept: HOSPITAL 25 - ED | Age: 62
Discharge: HOME | End: 2017-12-13
Payer: MEDICARE

## 2017-12-13 VITALS — DIASTOLIC BLOOD PRESSURE: 76 MMHG | SYSTOLIC BLOOD PRESSURE: 122 MMHG

## 2017-12-13 DIAGNOSIS — E11.22: ICD-10-CM

## 2017-12-13 DIAGNOSIS — Z88.0: ICD-10-CM

## 2017-12-13 DIAGNOSIS — N18.9: ICD-10-CM

## 2017-12-13 DIAGNOSIS — R53.1: Primary | ICD-10-CM

## 2017-12-13 DIAGNOSIS — Z99.2: ICD-10-CM

## 2017-12-13 DIAGNOSIS — Z88.2: ICD-10-CM

## 2017-12-13 DIAGNOSIS — I12.9: ICD-10-CM

## 2017-12-13 DIAGNOSIS — Z87.891: ICD-10-CM

## 2017-12-13 LAB
ALBUMIN SERPL BCG-MCNC: 2.4 G/DL (ref 3.2–5.2)
ALP SERPL-CCNC: 176 U/L (ref 34–104)
ALT SERPL W P-5'-P-CCNC: 8 U/L (ref 7–52)
ANION GAP SERPL CALC-SCNC: 10 MMOL/L (ref 2–11)
AST SERPL-CCNC: 20 U/L (ref 13–39)
BUN SERPL-MCNC: 59 MG/DL (ref 6–24)
BUN/CREAT SERPL: 12.8 (ref 8–20)
CALCIUM SERPL-MCNC: 9.3 MG/DL (ref 8.6–10.3)
CHLORIDE SERPL-SCNC: 100 MMOL/L (ref 101–111)
CREAT SERPL-MCNC: 0.21 MG/DL (ref 0.67–1.17)
GLOBULIN SER CALC-MCNC: 4 G/DL (ref 2–4)
GLUCOSE SERPL-MCNC: 77 MG/DL (ref 70–100)
HCO3 SERPL-SCNC: 22 MMOL/L (ref 22–32)
POTASSIUM SERPL-SCNC: 4.4 MMOL/L (ref 3.5–5)
PROT SERPL-MCNC: 6.4 G/DL (ref 6.4–8.9)
SODIUM SERPL-SCNC: 132 MMOL/L (ref 133–145)

## 2017-12-13 PROCEDURE — 99282 EMERGENCY DEPT VISIT SF MDM: CPT

## 2017-12-13 PROCEDURE — 36415 COLL VENOUS BLD VENIPUNCTURE: CPT

## 2017-12-13 PROCEDURE — 80053 COMPREHEN METABOLIC PANEL: CPT

## 2017-12-13 NOTE — ED
Margot MATOS Edward, scribed for Daniel Benjamin MD on 12/13/17 at 1003 .





Complex/Multi-Sys Presentation





- HPI Summary


HPI Summary: 





63 y/o male BIBA c/o general weakness. Associated sx: bilateral pedal edema. 

Symptoms not aggravated or alleviated by anything. Pt lives in a nursing 

facility. Information was called in by Dr. Barnes, who requests the pt be 

dialyzed. PMHx renal failure.





- History Of Current Complaint


Chief Complaint: EDGeneral


Time Seen by Provider: 12/13/17 09:55


Hx Obtained From: Patient


Timing: Constant


Associated Signs And Symptoms: Positive: Weakness, Edema





- Allergies/Home Medications


Allergies/Adverse Reactions: 


 Allergies











Allergy/AdvReac Type Severity Reaction Status Date / Time


 


Heparin Allergy Intermediate GI Upset, Verified 12/12/17 08:00





   hives  


 


Cephalexin [From Keflex] Allergy  Hives Verified 10/25/17 17:55


 


Penicillins Allergy  Hives Verified 10/25/17 17:56


 


Rivaroxaban [From Xarelto] Allergy  Unknown Verified 10/25/17 16:49





   Reaction  





   Details  


 


Sulfa Antibiotics Allergy  Unknown Verified 10/25/17 16:49





   Reaction  





   Details  


 


Warfarin Allergy  Unknown Verified 10/25/17 16:49





   Reaction  





   Details  














PMH/Surg Hx/FS Hx/Imm Hx


Previously Healthy: No


Endocrine/Hematology History: Reports: Hx Diabetes


Cardiovascular History: Reports: Hx Hypertension


GI History: Reports: Hx Diverticulosis


 History: Reports: Hx Chronic Renal Failure, Hx Dialysis


Sensory History: Reports: Hx Contacts or Glasses


   Denies: Hx Hearing Aid


Opthamlomology History: Reports: Hx Contacts or Glasses





- Surgical History


Surgery Procedure, Year, and Place: knees, bowel resection, colostomy reversal


Infectious Disease History: Yes


Infectious Disease History: 


   Denies: Hx of Known/Suspected MRSA - MRSA negative, Traveled Outside the [place] 

in Last 30 Days





- Family History


Known Family History: Positive: Unknown





- Social History


Alcohol Use: None


Hx Substance Use: No


Substance Use Type: Reports: None


Hx Tobacco Use: Yes


Smoking Status (MU): Former Smoker





Review of Systems


Constitutional: Negative


Eyes: Negative


ENT: Negative


Cardiovascular: Negative


Respiratory: Negative


Gastrointestinal: Negative


Genitourinary: Negative


Positive: Edema


Skin: Negative


Positive: Weakness


Psychological: Normal


All Other Systems Reviewed And Are Negative: Yes





Physical Exam


Triage Information Reviewed: Yes


Vital Signs On Initial Exam: 


 Initial Vitals











Temp Pulse Resp BP Pulse Ox


 


 96.2 F   111   20   116/62   97 


 


 12/13/17 09:52  12/13/17 09:52  12/13/17 09:52  12/13/17 09:52  12/13/17 09:52











Vital Signs Reviewed: Yes


Appearance: Positive: Well-Appearing, No Pain Distress


Skin: Positive: Warm, Skin Color Reflects Adequate Perfusion, Dry


Head/Face: Positive: Normal Head/Face Inspection


Eyes: Positive: EOMI, NILE


ENT: Positive: Normal ENT inspection


Neck: Positive: Supple, Nontender


Respiratory/Lung Sounds: Positive: Clear to Auscultation, Breath Sounds Present


Cardiovascular: Positive: RRR


Abdomen Description: Positive: Nontender, Soft


Bowel Sounds: Positive: Present


Musculoskeletal: Positive: Normal, Strength/ROM Intact


Neurological: Positive: Normal, Sensory/Motor Intact, Alert, Oriented to Person 

Place, Time


Psychiatric: Positive: Affect/Mood Appropriate





Diagnostics





- Vital Signs


 Vital Signs











  Temp Pulse Resp BP Pulse Ox


 


 12/13/17 09:52  96.2 F  111  20  116/62  97














- Laboratory


Lab Results: 


 Lab Results











  12/13/17 Range/Units





  11:00 


 


Sodium  132 L  (133-145)  mmol/L


 


Potassium  4.4  (3.5-5.0)  mmol/L


 


Chloride  100 L  (101-111)  mmol/L


 


Carbon Dioxide  22  (22-32)  mmol/L


 


Anion Gap  10  (2-11)  mmol/L


 


BUN  59 H  (6-24)  mg/dL


 


Creatinine  4.62 H  (0.67-1.17)  mg/dL


 


1/Creatinine  0.21 L  (0.67-1.17)  mg/dL


 


Est GFR ( Amer)  16.5  (>60)  


 


Est GFR (Non-Af Amer)  12.9  (>60)  


 


BUN/Creatinine Ratio  12.8  (8-20)  


 


Glucose  77  ()  mg/dL


 


Calcium  9.3  (8.6-10.3)  mg/dL


 


Total Bilirubin  3.20 H  (0.2-1.0)  mg/dL


 


AST  20  (13-39)  U/L


 


ALT  8  (7-52)  U/L


 


Alkaline Phosphatase  176 H  ()  U/L


 


Total Protein  6.4  (6.4-8.9)  g/dL


 


Albumin  2.4 L  (3.2-5.2)  g/dL


 


Globulin  4.0  (2-4)  g/dL


 


Albumin/Globulin Ratio  0.6 L  (1-3)  











Result Diagrams: 


 12/13/17 11:00


Lab Statement: Any lab studies that have been ordered have been reviewed, and 

results considered in the medical decision making process.





Complex Multi-Symp Course/Dx


Course Of Treatment: F/U DR CASTELLON.  NO CRITICAL CARE TIME





- Diagnoses


Provider Diagnoses: 


 Dialysis patient








Discharge





- Discharge Plan


Condition: Stable


Disposition: HOME


Patient Education Materials:  Chronic Kidney Disease (ED)


Referrals: 


No Primary Care Phys,NOPCP [Medical Doctor] - 


Otis Castellon MD [Medical Doctor] - 


Additional Instructions: 


FOLLOW UP WITH DR CASTELLON, NEPHROLOGY.


RETURN TO THE EMERGENCY DEPARTMENT FOR ANY WORSENING OF YOUR CONDITION OR 

QUESTIONS OR CONCERNS.





The documentation as recorded by the Margot vieyra Edward accurately reflects the 

service I personally performed and the decisions made by me, Daniel Benjamin MD.

## 2017-12-18 ENCOUNTER — HOSPITAL ENCOUNTER (EMERGENCY)
Dept: HOSPITAL 25 - ED | Age: 62
Discharge: HOME | End: 2017-12-18
Payer: MEDICARE

## 2017-12-18 VITALS — SYSTOLIC BLOOD PRESSURE: 103 MMHG | DIASTOLIC BLOOD PRESSURE: 72 MMHG

## 2017-12-18 DIAGNOSIS — E11.22: Primary | ICD-10-CM

## 2017-12-18 DIAGNOSIS — N18.6: ICD-10-CM

## 2017-12-18 DIAGNOSIS — I12.0: ICD-10-CM

## 2017-12-18 DIAGNOSIS — Z99.2: ICD-10-CM

## 2017-12-18 DIAGNOSIS — Z87.891: ICD-10-CM

## 2017-12-18 LAB
ALBUMIN SERPL BCG-MCNC: 2.3 G/DL (ref 3.2–5.2)
ALP SERPL-CCNC: 193 U/L (ref 34–104)
ALT SERPL W P-5'-P-CCNC: 8 U/L (ref 7–52)
ANION GAP SERPL CALC-SCNC: 9 MMOL/L (ref 2–11)
AST SERPL-CCNC: 21 U/L (ref 13–39)
BUN SERPL-MCNC: 65 MG/DL (ref 6–24)
BUN/CREAT SERPL: 12.8 (ref 8–20)
CALCIUM SERPL-MCNC: 9.5 MG/DL (ref 8.6–10.3)
CHLORIDE SERPL-SCNC: 95 MMOL/L (ref 101–111)
GLOBULIN SER CALC-MCNC: 3.9 G/DL (ref 2–4)
GLUCOSE SERPL-MCNC: 88 MG/DL (ref 70–100)
HCO3 SERPL-SCNC: 26 MMOL/L (ref 22–32)
HCT VFR BLD AUTO: 29 % (ref 42–52)
HGB BLD-MCNC: 9.5 G/DL (ref 14–18)
MCH RBC QN AUTO: 30 PG (ref 27–31)
MCHC RBC AUTO-ENTMCNC: 33 G/DL (ref 31–36)
MCV RBC AUTO: 92 FL (ref 80–94)
POTASSIUM SERPL-SCNC: 4 MMOL/L (ref 3.5–5)
PROT SERPL-MCNC: 6.2 G/DL (ref 6.4–8.9)
RBC # BLD AUTO: 3.13 10^6/UL (ref 4–5.4)
SODIUM SERPL-SCNC: 130 MMOL/L (ref 133–145)
WBC # BLD AUTO: 10.6 10^3/UL (ref 3.5–10.8)

## 2017-12-18 PROCEDURE — 99282 EMERGENCY DEPT VISIT SF MDM: CPT

## 2017-12-18 PROCEDURE — 36415 COLL VENOUS BLD VENIPUNCTURE: CPT

## 2017-12-18 PROCEDURE — 80053 COMPREHEN METABOLIC PANEL: CPT

## 2017-12-18 PROCEDURE — 85025 COMPLETE CBC W/AUTO DIFF WBC: CPT

## 2017-12-18 NOTE — ED
Lupillo MATOS Angela, scribed for Dylan Jhaveri MD on 12/18/17 at 0841 .





Complex/Multi-Sys Presentation





- HPI Summary


HPI Summary: 





This pt is a 63 y/o male presenting to [facility] for dialysis. Associated sx: 

bilateral pedal edema and generalized weakness. Symptoms not aggravated or 

alleviated by anything. Pt lives in a nursing facility. Information was called 

in by Dr. Castellon, who requests the pt come for dialysis. PMHx renal failure. 





- History Of Current Complaint


Chief Complaint: EDGeneral


Time Seen by Provider: 12/18/17 08:39


Hx Obtained From: Patient


Onset/Duration: Lasting Days, Still Present


Timing: Days


Location: Negative


Associated Signs And Symptoms: Positive: Weakness, Other - edema in LE


Related History: Other - chronic renal failure





- Allergies/Home Medications


Allergies/Adverse Reactions: 


 Allergies











Allergy/AdvReac Type Severity Reaction Status Date / Time


 


Heparin Allergy Intermediate GI Upset, Verified 12/18/17 11:14





   hives  


 


Cephalexin [From Keflex] Allergy  Hives Verified 10/25/17 17:55


 


Penicillins Allergy  Hives Verified 10/25/17 17:56


 


Rivaroxaban [From Xarelto] Allergy  Unknown Verified 10/25/17 16:49





   Reaction  





   Details  


 


Sulfa Antibiotics Allergy  Unknown Verified 10/25/17 16:49





   Reaction  





   Details  


 


Warfarin Allergy  Unknown Verified 10/25/17 16:49





   Reaction  





   Details  














PMH/Surg Hx/FS Hx/Imm Hx


Endocrine/Hematology History: Reports: Hx Diabetes


Cardiovascular History: Reports: Hx Hypertension


GI History: Reports: Hx Diverticulosis


 History: Reports: Hx Chronic Renal Failure, Hx Dialysis


Sensory History: Reports: Hx Contacts or Glasses


   Denies: Hx Hearing Aid


Opthamlomology History: Reports: Hx Contacts or Glasses





- Surgical History


Surgery Procedure, Year, and Place: knees, bowel resection, colostomy reversal


Infectious Disease History: No


Infectious Disease History: 


   Denies: Hx of Known/Suspected MRSA - MRSA negative, Traveled Outside the [place] 

in Last 30 Days





- Family History


Known Family History: 


   Negative: Cardiac Disease, Diabetes





- Social History


Alcohol Use: None


Hx Substance Use: No


Substance Use Type: Reports: None


Hx Tobacco Use: Yes


Smoking Status (MU): Former Smoker





Review of Systems


Negative: Fever, Chills


Eyes: Negative


ENT: Negative


Cardiovascular: Negative


Positive: Edema - in LE


Positive: Weakness - generalized


All Other Systems Reviewed And Are Negative: Yes





Physical Exam





- Summary


Physical Exam Summary: 





Appearance: The patient is well-nourished in no acute distress and in no acute 

pain.


Skin: The skin is warm and dry and skin color reflects adequate perfusion.


HEENT: The head is normocephalic and atraumatic. The pupils are equal and 

reactive. The conjunctivae are clear and without drainage. Nares are patent and 

without drainage. Mouth reveals moist mucous membranes and the throat is 

without erythema and exudate. The external ears are intact. The ear canals are 

patent and without drainage. The tympanic membranes are intact.


Neck: the neck is supple with full range of motion and non-tender. There are no 

carotid bruits. There is no neck vein distension.


Respiratory: Chest is non-tender. Lungs are clear to auscultation and breath 

sounds are symmetrical and equal.


Cardiovascular: Heart is regular rate and rhythm. There is no murmur or rub 

auscultated. There is no peripheral edema and pulses are symmetrical and equal.


Abdomen: The abdomen is soft and non-tender. There are normal bowel sounds 

heard in all four quadrants and there is no organomegaly palpated.


Musculoskeletal: There is no back tenderness noted. Extremities are non-tender 

with full range of motion. There is good capillary refill. There is no 

peripheral edema or calf tenderness elicited.


Neurological: Patient is alert and oriented to person, place and time. The 

patient has symmetrical motor strength in all four extremities. Cranial nerves 

are grossly intact. Deep tendon reflexes are symmetrical and equal in all four 

extremities.


Psychiatric: The patient has an appropriate affect and does not exhibit any 

anxiety or depression.


Triage Information Reviewed: Yes


Vital Signs On Initial Exam: 


 Initial Vitals











Temp Pulse Resp BP Pulse Ox


 


 97.4 F   101   18   109/75   96 


 


 12/18/17 08:35  12/18/17 08:35  12/18/17 08:35  12/18/17 08:35  12/18/17 08:35











Vital Signs Reviewed: Yes





Diagnostics





- Vital Signs


 Vital Signs











  Temp Pulse Resp BP Pulse Ox


 


 12/18/17 08:35  97.4 F  101  18  109/75  96














- Laboratory


Lab Results: 


 Lab Results











  12/18/17 12/18/17 Range/Units





  10:30 10:30 


 


WBC   10.6  (3.5-10.8)  10^3/ul


 


RBC   3.13 L  (4.0-5.4)  10^6/ul


 


Hgb   9.5 L  (14.0-18.0)  g/dl


 


Hct   29 L  (42-52)  %


 


MCV   92  (80-94)  fL


 


MCH   30  (27-31)  pg


 


MCHC   33  (31-36)  g/dl


 


RDW   20 H  (10.5-15)  %


 


Plt Count   329  (150-450)  10^3/ul


 


MPV   8  (7.4-10.4)  um3


 


Neut % (Auto)   56.3  (38-83)  %


 


Lymph % (Auto)   10.9 L  (25-47)  %


 


Mono % (Auto)   13.7 H  (1-9)  %


 


Eos % (Auto)   16.7 H  (0-6)  %


 


Baso % (Auto)   2.4 H  (0-2)  %


 


Absolute Neuts (auto)   6.0  (1.5-7.7)  10^3/ul


 


Absolute Lymphs (auto)   1.2  (1.0-4.8)  10^3/ul


 


Absolute Monos (auto)   1.5 H  (0-0.8)  10^3/ul


 


Absolute Eos (auto)   1.8 H  (0-0.6)  10^3/ul


 


Absolute Basos (auto)   0.3 H  (0-0.2)  10^3/ul


 


Absolute Nucleated RBC   0.01  10^3/ul


 


Nucleated RBC %   0.1  


 


Sodium  130 L   (133-145)  mmol/L


 


Potassium  4.0   (3.5-5.0)  mmol/L


 


Chloride  95 L   (101-111)  mmol/L


 


Carbon Dioxide  26   (22-32)  mmol/L


 


Anion Gap  9   (2-11)  mmol/L


 


BUN  65 H   (6-24)  mg/dL


 


Creatinine  5.06 H   (0.67-1.17)  mg/dL


 


Est GFR ( Amer)  14.9   (>60)  


 


Est GFR (Non-Af Amer)  11.6   (>60)  


 


BUN/Creatinine Ratio  12.8   (8-20)  


 


Glucose  88   ()  mg/dL


 


Calcium  9.5   (8.6-10.3)  mg/dL


 


Total Bilirubin  2.80 H   (0.2-1.0)  mg/dL


 


AST  21   (13-39)  U/L


 


ALT  8   (7-52)  U/L


 


Alkaline Phosphatase  193 H   ()  U/L


 


Total Protein  6.2 L   (6.4-8.9)  g/dL


 


Albumin  2.3 L   (3.2-5.2)  g/dL


 


Globulin  3.9   (2-4)  g/dL


 


Albumin/Globulin Ratio  0.6 L   (1-3)  











Result Diagrams: 


 12/18/17 10:30





 12/18/17 10:30


Lab Statement: Any lab studies that have been ordered have been reviewed, and 

results considered in the medical decision making process.





Re-Evaluation





- Re-Evaluation


  ** First Eval


Re-Evaluation Time: 15:06


Comment: Pt is back from dialysis.





Complex Multi-Symp Course/Dx


Course Of Treatment: Mr. Blake felt fine after dialysis and was stable in the ED.





- Diagnoses


Provider Diagnoses: 


 Dialysis patient








Discharge





- Discharge Plan


Condition: Stable


Disposition: HOME


Patient Education Materials:  Chronic Kidney Disease (ED)


Referrals: 


Chuy Brown MD [Primary Care Provider] - 


Additional Instructions: 


FOLLOW UP WITH DR. CASTELLON, NEPHROLOGY.


RETURN TO THE EMERGENCY DEPARTMENT FOR ANY WORSENING OF YOUR CONDITION OR 

QUESTIONS OR CONCERNS.





The documentation as recorded by the Lupillo vieyra Angela accurately reflects 

the service I personally performed and the decisions made by me, Dylan Jhaveri MD.

## 2017-12-22 ENCOUNTER — HOSPITAL ENCOUNTER (EMERGENCY)
Dept: HOSPITAL 25 - ED | Age: 62
Discharge: HOME | End: 2017-12-22
Payer: MEDICARE

## 2017-12-22 VITALS — SYSTOLIC BLOOD PRESSURE: 138 MMHG | DIASTOLIC BLOOD PRESSURE: 78 MMHG

## 2017-12-22 DIAGNOSIS — I12.0: Primary | ICD-10-CM

## 2017-12-22 DIAGNOSIS — E11.22: ICD-10-CM

## 2017-12-22 DIAGNOSIS — N18.6: ICD-10-CM

## 2017-12-22 DIAGNOSIS — Z87.891: ICD-10-CM

## 2017-12-22 DIAGNOSIS — Z99.2: ICD-10-CM

## 2017-12-22 LAB
ALBUMIN SERPL BCG-MCNC: 2.4 G/DL (ref 3.2–5.2)
ALP SERPL-CCNC: 182 U/L (ref 34–104)
ALT SERPL W P-5'-P-CCNC: 8 U/L (ref 7–52)
ANION GAP SERPL CALC-SCNC: 9 MMOL/L (ref 2–11)
AST SERPL-CCNC: 22 U/L (ref 13–39)
BUN SERPL-MCNC: 59 MG/DL (ref 6–24)
BUN/CREAT SERPL: 14.2 (ref 8–20)
CALCIUM SERPL-MCNC: 9.9 MG/DL (ref 8.6–10.3)
CHLORIDE SERPL-SCNC: 95 MMOL/L (ref 101–111)
CREAT SERPL-MCNC: 0.24 MG/DL (ref 0.67–1.17)
GLOBULIN SER CALC-MCNC: 3.8 G/DL (ref 2–4)
GLUCOSE SERPL-MCNC: 74 MG/DL (ref 70–100)
HCO3 SERPL-SCNC: 27 MMOL/L (ref 22–32)
HCT VFR BLD AUTO: 29 % (ref 42–52)
HGB BLD-MCNC: 9.8 G/DL (ref 14–18)
MCH RBC QN AUTO: 31 PG (ref 27–31)
MCHC RBC AUTO-ENTMCNC: 33 G/DL (ref 31–36)
MCV RBC AUTO: 92 FL (ref 80–94)
POTASSIUM SERPL-SCNC: 3.8 MMOL/L (ref 3.5–5)
PROT SERPL-MCNC: 6.2 G/DL (ref 6.4–8.9)
RBC # BLD AUTO: 3.19 10^6/UL (ref 4–5.4)
SODIUM SERPL-SCNC: 131 MMOL/L (ref 133–145)
WBC # BLD AUTO: 10.4 10^3/UL (ref 3.5–10.8)

## 2017-12-22 PROCEDURE — 85025 COMPLETE CBC W/AUTO DIFF WBC: CPT

## 2017-12-22 PROCEDURE — 36415 COLL VENOUS BLD VENIPUNCTURE: CPT

## 2017-12-22 PROCEDURE — 99282 EMERGENCY DEPT VISIT SF MDM: CPT

## 2017-12-22 PROCEDURE — 80053 COMPREHEN METABOLIC PANEL: CPT

## 2017-12-22 NOTE — ED
Errol MATOS Natalie, scribed for Dylan Jhaveri MD on 12/22/17 at 0917 .





Medical Screening





- HPI Summary


HPI Summary: 


The pt is a 61 y/o M BIBA to the ED c/o needing dialysis clearance since 12/20/ 17. He missed his dialysis on 12/20/17, and he last had treatment on 12/18/17.





- History of Current Complaint


Chief Complaint: EDGeneral


Stated Complaint: NEED DIALYSIS


Time Seen by Provider: 12/22/17 08:35


Onset/Duration: Started Days Ago - last dialysis on 12/18/17, missed on 12/20/17


Associated Signs and Symptoms: Negative





PMH/Surg Hx/FS Hx/Imm Hx


Previously Healthy: No


Endocrine/Hematology History: Reports: Hx Diabetes


Cardiovascular History: Reports: Hx Hypertension


GI History: Reports: Hx Diverticulosis


 History: Reports: Hx Chronic Renal Failure, Hx Dialysis


Sensory History: Reports: Hx Contacts or Glasses


   Denies: Hx Hearing Aid


Opthamlomology History: Reports: Hx Contacts or Glasses





- Surgical History


Surgery Procedure, Year, and Place: knees, bowel resection, colostomy reversal


Infectious Disease History: No


Infectious Disease History: 


   Denies: Hx of Known/Suspected MRSA - MRSA negative, Traveled Outside the [place] 

in Last 30 Days





- Family History


Known Family History: 


   Negative: Cardiac Disease, Diabetes





- Social History


Alcohol Use: None


Hx Substance Use: No


Substance Use Type: Reports: None


Hx Tobacco Use: Yes


Smoking Status (MU): Former Smoker





Review of Systems


Negative: Fever


Positive: no symptoms reported


All Other Systems Reviewed And Are Negative: Yes





Physical Exam





- Summary


Physical Exam Summary: 


Appearance: The patient is well-nourished in no acute distress and in no acute 

pain.


Skin: The skin is warm and dry and skin color reflects adequate perfusion.


HEENT: The head is normocephalic and atraumatic. The pupils are equal and 

reactive. The conjunctivae are clear and without drainage. Nares are patent and 

without drainage. Mouth reveals moist mucous membranes and the throat is 

without erythema and exudate. The external ears are intact. The ear canals are 

patent and without drainage. The tympanic membranes are intact.


Neck: The neck is supple with full range of motion and non-tender. There are no 

carotid bruits. There is no neck vein distension.


Respiratory: Chest is non-tender. Lungs are clear to auscultation and breath 

sounds are symmetrical and equal.


Cardiovascular: Heart is regular rate and rhythm. There is no murmur or rub 

auscultated. There is no peripheral edema and pulses are symmetrical and equal.


Abdomen: The abdomen is soft and non-tender. There are normal bowel sounds 

heard in all four quadrants and there is no organomegaly palpated.


Musculoskeletal: There is no back tenderness noted. Extremities are non-tender 

with full range of motion. There is good capillary refill. There is no 

peripheral edema or calf tenderness elicited.


Neurological: Patient is alert and oriented to person, place and time. The 

patient has symmetrical motor strength in all four extremities. Cranial nerves 

are grossly intact. Deep tendon reflexes are symmetrical and equal in all four 

extremities.


Psychiatric: The patient has an appropriate affect and does not exhibit any 

anxiety or depression.


Triage Information Reviewed: Yes


Vital Signs On Initial Exam: 


 Initial Vitals











Temp Pulse Resp BP Pulse Ox


 


 98.7 F   99   20   105/57   94 


 


 12/22/17 08:30  12/22/17 08:30  12/22/17 08:30  12/22/17 08:30  12/22/17 08:30











Vital Signs Reviewed: Yes





Diagnostics





- Vital Signs


 Vital Signs











  Temp Pulse Resp BP Pulse Ox


 


 12/22/17 08:30  98.7 F  99  20  105/57  94














- Laboratory


Lab Results: 


 Lab Results











  12/22/17 12/22/17 Range/Units





  09:30 09:30 


 


WBC  10.4   (3.5-10.8)  10^3/ul


 


RBC  3.19 L   (4.0-5.4)  10^6/ul


 


Hgb  9.8 L   (14.0-18.0)  g/dl


 


Hct  29 L   (42-52)  %


 


MCV  92   (80-94)  fL


 


MCH  31   (27-31)  pg


 


MCHC  33   (31-36)  g/dl


 


RDW  19 H   (10.5-15)  %


 


Plt Count  325   (150-450)  10^3/ul


 


MPV  7 L   (7.4-10.4)  um3


 


Neut % (Auto)  57.9   (38-83)  %


 


Lymph % (Auto)  9.0 L   (25-47)  %


 


Mono % (Auto)  15.1 H   (1-9)  %


 


Eos % (Auto)  14.3 H   (0-6)  %


 


Baso % (Auto)  3.7 H   (0-2)  %


 


Absolute Neuts (auto)  6.0   (1.5-7.7)  10^3/ul


 


Absolute Lymphs (auto)  0.9 L   (1.0-4.8)  10^3/ul


 


Absolute Monos (auto)  1.6 H   (0-0.8)  10^3/ul


 


Absolute Eos (auto)  1.5 H   (0-0.6)  10^3/ul


 


Absolute Basos (auto)  0.4 H   (0-0.2)  10^3/ul


 


Absolute Nucleated RBC  0   10^3/ul


 


Nucleated RBC %  0   


 


Sodium   131 L  (133-145)  mmol/L


 


Potassium   3.8  (3.5-5.0)  mmol/L


 


Chloride   95 L  (101-111)  mmol/L


 


Carbon Dioxide   27  (22-32)  mmol/L


 


Anion Gap   9  (2-11)  mmol/L


 


BUN   59 H  (6-24)  mg/dL


 


Creatinine   4.16 H  (0.67-1.17)  mg/dL


 


1/Creatinine   0.24 L  (0.67-1.17)  mg/dL


 


Est GFR ( Amer)   18.7  (>60)  


 


Est GFR (Non-Af Amer)   14.5  (>60)  


 


BUN/Creatinine Ratio   14.2  (8-20)  


 


Glucose   74  ()  mg/dL


 


Calcium   9.9  (8.6-10.3)  mg/dL


 


Total Bilirubin   2.60 H  (0.2-1.0)  mg/dL


 


AST   22  (13-39)  U/L


 


ALT   8  (7-52)  U/L


 


Alkaline Phosphatase   182 H  ()  U/L


 


Total Protein   6.2 L  (6.4-8.9)  g/dL


 


Albumin   2.4 L  (3.2-5.2)  g/dL


 


Globulin   3.8  (2-4)  g/dL


 


Albumin/Globulin Ratio   0.6 L  (1-3)  











Result Diagrams: 


 12/22/17 09:30





 12/22/17 09:30


Lab Statement: Any lab studies that have been ordered have been reviewed, and 

results considered in the medical decision making process.





Course/Dx





- Course


Course Of Treatment: Mr. Blake had no C/O, went to dialysis and was D/C'd in 

stable condition.





- Diagnoses


Provider Diagnoses: 


 End stage renal disease, Dependence on renal dialysis, Personal history of 

nicotine dependence, Type 2 diabetes mellitus with diabetic chronic kidney 

disease








Discharge





- Discharge Plan


Condition: Stable


Disposition: HOME


Patient Education Materials:  End Stage Kidney Disease (ED)


Referrals: 


Chuy Brown MD [Primary Care Provider] - If Needed


Additional Instructions: 


Follow up with your primary care provider as needed. Return to the Emergency 

Department if any new or worsening symptoms arise.





The documentation as recorded by the Errol vieyra Natalie accurately reflects 

the service I personally performed and the decisions made by me, Dylan Jhaveri MD.

## 2017-12-26 ENCOUNTER — HOSPITAL ENCOUNTER (EMERGENCY)
Dept: HOSPITAL 25 - ED | Age: 62
Discharge: HOME | End: 2017-12-26
Payer: MEDICARE

## 2017-12-26 VITALS — SYSTOLIC BLOOD PRESSURE: 111 MMHG | DIASTOLIC BLOOD PRESSURE: 52 MMHG

## 2017-12-26 DIAGNOSIS — Z99.2: ICD-10-CM

## 2017-12-26 DIAGNOSIS — I12.0: Primary | ICD-10-CM

## 2017-12-26 DIAGNOSIS — N18.6: ICD-10-CM

## 2017-12-26 DIAGNOSIS — Z87.891: ICD-10-CM

## 2017-12-26 DIAGNOSIS — E11.22: ICD-10-CM

## 2017-12-26 DIAGNOSIS — K57.90: ICD-10-CM

## 2017-12-26 LAB
HCT VFR BLD AUTO: 29 % (ref 42–52)
HGB BLD-MCNC: 9.5 G/DL (ref 14–18)
MCH RBC QN AUTO: 31 PG (ref 27–31)
MCHC RBC AUTO-ENTMCNC: 33 G/DL (ref 31–36)
MCV RBC AUTO: 94 FL (ref 80–94)
PLATELET # BLD AUTO: 319 10^3/UL (ref 150–450)
RBC # BLD AUTO: 3.06 10^6/UL (ref 4–5.4)
WBC # BLD AUTO: 9.8 10^3/UL (ref 3.5–10.8)

## 2017-12-26 PROCEDURE — 80053 COMPREHEN METABOLIC PANEL: CPT

## 2017-12-26 PROCEDURE — 83540 ASSAY OF IRON: CPT

## 2017-12-26 PROCEDURE — 82728 ASSAY OF FERRITIN: CPT

## 2017-12-26 PROCEDURE — 96374 THER/PROPH/DIAG INJ IV PUSH: CPT

## 2017-12-26 PROCEDURE — 83550 IRON BINDING TEST: CPT

## 2017-12-26 PROCEDURE — 36415 COLL VENOUS BLD VENIPUNCTURE: CPT

## 2017-12-26 PROCEDURE — 99281 EMR DPT VST MAYX REQ PHY/QHP: CPT

## 2017-12-26 PROCEDURE — 85027 COMPLETE CBC AUTOMATED: CPT

## 2017-12-26 NOTE — ED
Complex/Multi-Sys Presentation





- HPI Summary


HPI Summary: 





This pt is a 63 y/o male presenting to [facility] for dialysis. Associated sx: 

bilateral pedal edema and generalized weakness.  He does not walk at baseline 

and Venous stasis ulcers present to bilateral lower extremities. Symptoms not 

aggravated or alleviated by anything. Pt lives in a nursing facility. 

Information was called in by Dr. Hernandez, who requests the pt come for dialysis. 

PMHx renal failure. Today he states he feels "wonderful" although he is 

extremely lethargic and will not open his eyes.  He states he is very tired 

since he does not sleep at night, but is otherwise feeling OK. Denies any and 

all symptoms this day including fevers, sweats and chills.








- History Of Current Complaint


Chief Complaint: EDGeneral


Time Seen by Provider: 12/26/17 08:41


Hx Obtained From: Patient


Onset/Duration: Sudden Onset


Timing: Constant


Severity Currently: Mild


Severity Initially: Mild





- Allergies/Home Medications


Allergies/Adverse Reactions: 


 Allergies











Allergy/AdvReac Type Severity Reaction Status Date / Time


 


Heparin Allergy Intermediate GI Upset, Verified 12/26/17 08:40





   hives  


 


Cephalexin [From Keflex] Allergy  Hives Verified 12/26/17 08:40


 


Penicillins Allergy  Hives Verified 12/26/17 08:40


 


Rivaroxaban [From Xarelto] Allergy  Unknown Verified 12/26/17 08:40





   Reaction  





   Details  


 


Sulfa Antibiotics Allergy  Unknown Verified 12/26/17 08:40





   Reaction  





   Details  


 


Warfarin Allergy  Unknown Verified 12/26/17 08:40





   Reaction  





   Details  














PMH/Surg Hx/FS Hx/Imm Hx


Previously Healthy: No


Endocrine/Hematology History: Reports: Hx Diabetes


Cardiovascular History: Reports: Hx Hypertension


GI History: Reports: Hx Diverticulosis


 History: Reports: Hx Chronic Renal Failure, Hx Dialysis


Sensory History: Reports: Hx Contacts or Glasses


   Denies: Hx Hearing Aid


Opthamlomology History: Reports: Hx Contacts or Glasses





- Surgical History


Surgery Procedure, Year, and Place: knees, bowel resection, colostomy reversal





- Immunization History


Hx Pertussis Vaccination: No


Immunizations Up to Date: Unable to Obtain/Confirm


Infectious Disease History: Yes


Infectious Disease History: 


   Denies: Hx of Known/Suspected MRSA - MRSA negative, Traveled Outside the [place] 

in Last 30 Days





- Family History


Known Family History: Positive: Unknown


   Negative: Cardiac Disease, Diabetes





- Social History


Occupation: Unemployed, Disabled


Lives: Assisted Living


Alcohol Use: None


Hx Substance Use: No


Substance Use Type: Reports: None


Hx Tobacco Use: Yes


Smoking Status (MU): Former Smoker





Review of Systems


Positive: Fatigue.  Negative: Fever, Chills


Eyes: Negative


Respiratory: Negative


Positive: no symptoms reported, see HPI


Musculoskeletal: Negative


Skin: Negative


Psychological: Normal


All Other Systems Reviewed And Are Negative: Yes





Physical Exam


Triage Information Reviewed: Yes


Vital Signs On Initial Exam: 


 Initial Vitals











Temp Pulse Resp BP Pulse Ox


 


 99.6 F   106   20   116/71   98 


 


 12/26/17 08:37  12/26/17 08:37  12/26/17 08:37  12/26/17 08:37  12/26/17 08:37











Vital Signs Reviewed: Yes


Appearance: Positive: Ill-Appearing, Obese


Skin: Positive: Purpura, Scaly Skin/Lesions, Weeping Skin/Lesions


Eyes: Positive: NILE


Neck: Positive: No Lymphadenopathy


Respiratory/Lung Sounds: Positive: Clear to Auscultation


Cardiovascular: Positive: Bradycardia


Musculoskeletal: Positive: Other - unable to walk at baseline


Neurological: Positive: Speech Normal


Psychiatric: Positive: Affect/Mood Appropriate





- Mayo Coma Scale


Coma Scale Total: 15





Diagnostics





- Vital Signs


 Vital Signs











  Temp Pulse Resp BP Pulse Ox


 


 12/26/17 08:37  99.6 F  106  20  116/71  98














- Laboratory


Result Diagrams: 


 12/26/17 10:45





 12/26/17 10:45


Lab Statement: Any lab studies that have been ordered have been reviewed, and 

results considered in the medical decision making process.





Complex Multi-Symp Course/Dx


Course Of Treatment: Patient is evaluated and needs to go to dialysis.  He is 

cleared for dialysis and called for transport.  On arrival back to ED he was 

100.8 temp on arrival and on recheck 10 minutes later 100.0.  He refuses 

ibuprofen or tylenol. He is noted to be 99.6 prior to dialysis. He is OK for 

discharge and is given strict return precatuions given fever.





- Diagnoses


Provider Diagnoses: 


 Dialysis patient








Discharge





- Discharge Plan


Condition: Stable


Disposition: HOME


Referrals: 


Chuy Brown MD [Primary Care Provider] -

## 2018-01-25 ENCOUNTER — HOSPITAL ENCOUNTER (INPATIENT)
Dept: HOSPITAL 25 - ED | Age: 63
LOS: 13 days | DRG: 441 | End: 2018-02-07
Attending: INTERNAL MEDICINE | Admitting: INTERNAL MEDICINE
Payer: MEDICARE

## 2018-01-25 DIAGNOSIS — E11.649: ICD-10-CM

## 2018-01-25 DIAGNOSIS — R19.7: ICD-10-CM

## 2018-01-25 DIAGNOSIS — I95.9: ICD-10-CM

## 2018-01-25 DIAGNOSIS — R18.8: ICD-10-CM

## 2018-01-25 DIAGNOSIS — Z88.2: ICD-10-CM

## 2018-01-25 DIAGNOSIS — L89.150: ICD-10-CM

## 2018-01-25 DIAGNOSIS — I13.0: ICD-10-CM

## 2018-01-25 DIAGNOSIS — Z93.3: ICD-10-CM

## 2018-01-25 DIAGNOSIS — K72.90: Primary | ICD-10-CM

## 2018-01-25 DIAGNOSIS — B35.1: ICD-10-CM

## 2018-01-25 DIAGNOSIS — J96.02: ICD-10-CM

## 2018-01-25 DIAGNOSIS — E11.22: ICD-10-CM

## 2018-01-25 DIAGNOSIS — J96.01: ICD-10-CM

## 2018-01-25 DIAGNOSIS — Z88.8: ICD-10-CM

## 2018-01-25 DIAGNOSIS — I70.202: ICD-10-CM

## 2018-01-25 DIAGNOSIS — K92.2: ICD-10-CM

## 2018-01-25 DIAGNOSIS — N18.4: ICD-10-CM

## 2018-01-25 DIAGNOSIS — I13.2: ICD-10-CM

## 2018-01-25 DIAGNOSIS — D63.1: ICD-10-CM

## 2018-01-25 DIAGNOSIS — E66.01: ICD-10-CM

## 2018-01-25 DIAGNOSIS — K74.60: ICD-10-CM

## 2018-01-25 DIAGNOSIS — I48.91: ICD-10-CM

## 2018-01-25 DIAGNOSIS — Z88.1: ICD-10-CM

## 2018-01-25 DIAGNOSIS — Z99.2: ICD-10-CM

## 2018-01-25 DIAGNOSIS — K75.81: ICD-10-CM

## 2018-01-25 DIAGNOSIS — E43: ICD-10-CM

## 2018-01-25 DIAGNOSIS — R57.9: ICD-10-CM

## 2018-01-25 DIAGNOSIS — E78.5: ICD-10-CM

## 2018-01-25 DIAGNOSIS — I50.9: ICD-10-CM

## 2018-01-25 DIAGNOSIS — N17.9: ICD-10-CM

## 2018-01-25 DIAGNOSIS — Z87.891: ICD-10-CM

## 2018-01-25 DIAGNOSIS — Z88.0: ICD-10-CM

## 2018-01-25 DIAGNOSIS — E83.52: ICD-10-CM

## 2018-01-25 DIAGNOSIS — E87.4: ICD-10-CM

## 2018-01-25 DIAGNOSIS — E27.40: ICD-10-CM

## 2018-01-25 DIAGNOSIS — Z86.14: ICD-10-CM

## 2018-01-25 DIAGNOSIS — R32: ICD-10-CM

## 2018-01-25 LAB
BASOPHILS # BLD AUTO: 0 10^3/UL (ref 0–0.2)
EOSINOPHIL # BLD AUTO: 1.6 10^3/UL (ref 0–0.6)
HCT VFR BLD AUTO: 36 % (ref 42–52)
HGB BLD-MCNC: 11.6 G/DL (ref 14–18)
INR PPP/BLD: 1.31 (ref 0.77–1.02)
LYMPHOCYTES # BLD AUTO: 1.3 10^3/UL (ref 1–4.8)
MCH RBC QN AUTO: 32 PG (ref 27–31)
MCHC RBC AUTO-ENTMCNC: 32 G/DL (ref 31–36)
MCV RBC AUTO: 99 FL (ref 80–94)
MONOCYTES # BLD AUTO: 0.9 10^3/UL (ref 0–0.8)
NEUTROPHILS # BLD AUTO: 3.6 10^3/UL (ref 1.5–7.7)
NRBC # BLD AUTO: 0 10^3/UL
NRBC BLD QL AUTO: 0.2
PLATELET # BLD AUTO: 258 10^3/UL (ref 150–450)
RBC # BLD AUTO: 3.66 10^6/UL (ref 4–5.4)
WBC # BLD AUTO: 7.4 10^3/UL (ref 3.5–10.8)

## 2018-01-25 PROCEDURE — 93005 ELECTROCARDIOGRAM TRACING: CPT

## 2018-01-25 PROCEDURE — 71045 X-RAY EXAM CHEST 1 VIEW: CPT

## 2018-01-25 PROCEDURE — 82803 BLOOD GASES ANY COMBINATION: CPT

## 2018-01-25 PROCEDURE — P9045 ALBUMIN (HUMAN), 5%, 250 ML: HCPCS

## 2018-01-25 PROCEDURE — 82330 ASSAY OF CALCIUM: CPT

## 2018-01-25 PROCEDURE — 83735 ASSAY OF MAGNESIUM: CPT

## 2018-01-25 PROCEDURE — 83880 ASSAY OF NATRIURETIC PEPTIDE: CPT

## 2018-01-25 PROCEDURE — G0257 UNSCHED DIALYSIS ESRD PT HOS: HCPCS

## 2018-01-25 PROCEDURE — 82271 OCCULT BLOOD OTHER SOURCES: CPT

## 2018-01-25 PROCEDURE — 84155 ASSAY OF PROTEIN SERUM: CPT

## 2018-01-25 PROCEDURE — 82306 VITAMIN D 25 HYDROXY: CPT

## 2018-01-25 PROCEDURE — 82784 ASSAY IGA/IGD/IGG/IGM EACH: CPT

## 2018-01-25 PROCEDURE — 80048 BASIC METABOLIC PNL TOTAL CA: CPT

## 2018-01-25 PROCEDURE — 84100 ASSAY OF PHOSPHORUS: CPT

## 2018-01-25 PROCEDURE — 82977 ASSAY OF GGT: CPT

## 2018-01-25 PROCEDURE — 87040 BLOOD CULTURE FOR BACTERIA: CPT

## 2018-01-25 PROCEDURE — 85025 COMPLETE CBC W/AUTO DIFF WBC: CPT

## 2018-01-25 PROCEDURE — 82652 VIT D 1 25-DIHYDROXY: CPT

## 2018-01-25 PROCEDURE — 84165 PROTEIN E-PHORESIS SERUM: CPT

## 2018-01-25 PROCEDURE — P9047 ALBUMIN (HUMAN), 25%, 50ML: HCPCS

## 2018-01-25 PROCEDURE — 85730 THROMBOPLASTIN TIME PARTIAL: CPT

## 2018-01-25 PROCEDURE — 85610 PROTHROMBIN TIME: CPT

## 2018-01-25 PROCEDURE — 94003 VENT MGMT INPAT SUBQ DAY: CPT

## 2018-01-25 PROCEDURE — 90935 HEMODIALYSIS ONE EVALUATION: CPT

## 2018-01-25 PROCEDURE — 83540 ASSAY OF IRON: CPT

## 2018-01-25 PROCEDURE — 80053 COMPREHEN METABOLIC PANEL: CPT

## 2018-01-25 PROCEDURE — 84443 ASSAY THYROID STIM HORMONE: CPT

## 2018-01-25 PROCEDURE — 85027 COMPLETE CBC AUTOMATED: CPT

## 2018-01-25 PROCEDURE — 84590 ASSAY OF VITAMIN A: CPT

## 2018-01-25 PROCEDURE — 76770 US EXAM ABDO BACK WALL COMP: CPT

## 2018-01-25 PROCEDURE — 93970 EXTREMITY STUDY: CPT

## 2018-01-25 PROCEDURE — 99283 EMERGENCY DEPT VISIT LOW MDM: CPT

## 2018-01-25 PROCEDURE — 94760 N-INVAS EAR/PLS OXIMETRY 1: CPT

## 2018-01-25 PROCEDURE — 70450 CT HEAD/BRAIN W/O DYE: CPT

## 2018-01-25 PROCEDURE — 94660 CPAP INITIATION&MGMT: CPT

## 2018-01-25 PROCEDURE — 94640 AIRWAY INHALATION TREATMENT: CPT

## 2018-01-25 PROCEDURE — 82533 TOTAL CORTISOL: CPT

## 2018-01-25 PROCEDURE — 85014 HEMATOCRIT: CPT

## 2018-01-25 PROCEDURE — 94002 VENT MGMT INPAT INIT DAY: CPT

## 2018-01-25 PROCEDURE — 36600 WITHDRAWAL OF ARTERIAL BLOOD: CPT

## 2018-01-25 PROCEDURE — 82397 CHEMILUMINESCENT ASSAY: CPT

## 2018-01-25 PROCEDURE — 84484 ASSAY OF TROPONIN QUANT: CPT

## 2018-01-25 PROCEDURE — 82140 ASSAY OF AMMONIA: CPT

## 2018-01-25 PROCEDURE — 83970 ASSAY OF PARATHORMONE: CPT

## 2018-01-25 PROCEDURE — 82728 ASSAY OF FERRITIN: CPT

## 2018-01-25 PROCEDURE — 85018 HEMOGLOBIN: CPT

## 2018-01-25 PROCEDURE — 84134 ASSAY OF PREALBUMIN: CPT

## 2018-01-25 PROCEDURE — 82668 ASSAY OF ERYTHROPOIETIN: CPT

## 2018-01-25 PROCEDURE — 83605 ASSAY OF LACTIC ACID: CPT

## 2018-01-25 PROCEDURE — 83550 IRON BINDING TEST: CPT

## 2018-01-25 PROCEDURE — 36415 COLL VENOUS BLD VENIPUNCTURE: CPT

## 2018-01-25 RX ADMIN — RIFAXIMIN SCH MG: 550 TABLET ORAL at 20:24

## 2018-01-25 RX ADMIN — GUAIFENESIN SCH MG: 600 TABLET, EXTENDED RELEASE ORAL at 20:24

## 2018-01-25 RX ADMIN — MIDODRINE HYDROCHLORIDE SCH MG: 5 TABLET ORAL at 20:24

## 2018-01-25 RX ADMIN — SODIUM CITRATE AND CITRIC ACID MONOHYDRATE SCH ML: 500; 334 SOLUTION ORAL at 20:25

## 2018-01-25 RX ADMIN — HYDROCORTISONE SCH APPLIC: 1 CREAM TOPICAL at 20:29

## 2018-01-25 RX ADMIN — ATORVASTATIN CALCIUM SCH MG: 10 TABLET, FILM COATED ORAL at 20:25

## 2018-01-25 NOTE — ED
Tonio MATOS Thomas, scribed for Ree Hillman MD on 01/25/18 at 1626 .





Complex/Multi-Sys Presentation





- HPI Summary


HPI Summary: 





The patient is a 62 year old male brought in by ambulance from the nursing home 

with shortness of breath and confusion all day. At baseline, he is typically 

awake, alert, and oriented. He is arousable and speaks in 1-2 word phrases. He 

has swelling to his lower extremities. He is edematous to his upper thighs. 

There is dry skin crusting and fungus to his toenails. 





LEVEL 5 CAVEAT: HPI LIMITED BY LIMITED RESPONSIVENESS OF PATIENT





- History Of Current Complaint


Chief Complaint: EDShortnessOfBreath


Time Seen by Provider: 01/25/18 15:09


Hx Obtained From: Patient


Onset/Duration: Still Present


Timing: Constant


Severity Currently: Moderate


Severity Initially: Moderate


Aggravating Factor(s): Unknown


Alleviating Factor(s): None


Associated Signs And Symptoms: Positive: Other - SOB, confusion, edema





- Allergies/Home Medications


Allergies/Adverse Reactions: 


 Allergies











Allergy/AdvReac Type Severity Reaction Status Date / Time


 


Heparin Allergy Intermediate GI Upset, Verified 12/26/17 08:40





   hives  


 


Cephalexin [From Keflex] Allergy  Hives Verified 12/26/17 08:40


 


Penicillins Allergy  Hives Verified 12/26/17 08:40


 


Rivaroxaban [From Xarelto] Allergy  Unknown Verified 12/26/17 08:40





   Reaction  





   Details  


 


Sulfa Antibiotics Allergy  Unknown Verified 12/26/17 08:40





   Reaction  





   Details  


 


Warfarin Allergy  Unknown Verified 12/26/17 08:40





   Reaction  





   Details  











Home Medications: 


 Home Medications





Atorvastatin* [Lipitor 10 MG*] 10 mg PO 1900 01/25/18 [History Confirmed 01/25/ 18]


Benzocaine/Menthol SARAH* [Chloraseptic SARAH*] 1 sarah PO Q2H PRN 01/25/18 [History 

Confirmed 01/25/18]


Lactobacillus Acidophilu (GG)* [Culturelle*] 1 cap PO QAM 01/25/18 [History 

Confirmed 01/25/18]


Lactulose* 30 ml PO BID 01/25/18 [History Confirmed 01/25/18]


Omeprazole CAP* [Prilosec CAP* 20 MG] 20 mg PO 0700 01/25/18 [History Confirmed 

01/25/18]


RiFAXimin* [Xifaxan*] 550 mg PO 0800,2000 01/25/18 [History Confirmed 01/25/18]


Sodium Citrate/Citric Acid* [Bicitra*] 15 ml PO 0800,2000 01/25/18 [History 

Confirmed 01/25/18]


guaiFENesin ER TAB [Mucinex*] 1,200 mg PO 0800,2000 01/25/18 [History Confirmed 

01/25/18]


oxyCODONE/Acetamin 5/325 MG* [Percocet 5/325 TAB*] 1 tab PO Q6H PRN 01/25/18 [

History Confirmed 01/25/18]











PMH/Surg Hx/FS Hx/Imm Hx


Endocrine/Hematology History: Reports: Hx Diabetes


Cardiovascular History: Reports: Hx Hypertension


GI History: Reports: Hx Diverticulosis


 History: Reports: Hx Chronic Renal Failure, Hx Dialysis


Sensory History: Reports: Hx Contacts or Glasses


   Denies: Hx Hearing Aid


Opthamlomology History: Reports: Hx Contacts or Glasses





- Surgical History


Surgery Procedure, Year, and Place: knees, bowel resection, colostomy reversal


Infectious Disease History: Unable to Obtain/Confirm


Infectious Disease History: 


   Denies: Hx of Known/Suspected MRSA - MRSA negative, Traveled Outside the [place] 

in Last 30 Days





- Family History


Known Family History: 


   Negative: Cardiac Disease, Diabetes





- Social History


Lives: At The Nursing Home


Alcohol Use: None


Hx Substance Use: No


Substance Use Type: Reports: None


Hx Tobacco Use: Yes


Smoking Status (MU): Former Smoker





Review of Systems





- ROS Summary


Review of Systems Summary: 





LEVEL 5 CAVEAT: ROS LIMITED BY LIMITED RESPONSIVENESS OF PATIENT


Negative: Fever


Positive: Shortness Of Breath


Positive: Edema


Neurological: Other - Confusion


All Other Systems Reviewed And Are Negative: No





Physical Exam





- Summary


Physical Exam Summary: 





LEVEL 5 CAVEAT: PHYSICAL EXAM LIMITED BY LIMITED RESPONSIVENESS OF PATIENT





Appearance: He is arousable and speaks in 1-2 word phrases. 


HEENT: Moist mucous membranes


Neck: No masses on the neck, supple


Respiratory: He has diminished breath sounds. 


Cardiovascular: Tachycardia. 


Abdomen: Soft, non-tender


Extremities: He has swelling to his lower extremities. He is edematous to his 

upper thighs. There is dry skin crusting and fungus to his toenails. 


Musculoskeletal: No obvious deformity, moving all extremities in a grossly 

normal manner


Neurological: He is arousable and speaks in 1-2 word phrases. 





Triage Information Reviewed: Yes


Vital Signs On Initial Exam: 


 Initial Vitals











Temp Pulse Resp BP Pulse Ox


 


 97.5 F   103   20   130/75   98 


 


 01/25/18 14:44  01/25/18 14:44  01/25/18 14:44  01/25/18 14:44  01/25/18 14:44











Vital Signs Reviewed: Yes





Diagnostics





- Vital Signs


 Vital Signs











  Temp Pulse Resp BP Pulse Ox


 


 01/25/18 15:15   103  23  123/79  98


 


 01/25/18 15:10   103  21   96


 


 01/25/18 14:44  97.5 F  103  20  130/75  98














- Laboratory


Lab Results: 


 Lab Results











  01/25/18 01/25/18 01/25/18 Range/Units





  15:21 15:21 15:21 


 


WBC   7.4   (3.5-10.8)  10^3/ul


 


RBC   3.66 L   (4.0-5.4)  10^6/ul


 


Hgb   11.6 L   (14.0-18.0)  g/dl


 


Hct   36 L   (42-52)  %


 


MCV   99 H   (80-94)  fL


 


MCH   32 H   (27-31)  pg


 


MCHC   32   (31-36)  g/dl


 


RDW   19 H   (10.5-15)  %


 


Plt Count   258   (150-450)  10^3/ul


 


MPV   8   (7.4-10.4)  um3


 


Neut % (Auto)   48.1   (38-83)  %


 


Lymph % (Auto)   17.8 L   (25-47)  %


 


Mono % (Auto)   12.1 H   (1-9)  %


 


Eos % (Auto)   21.3 H   (0-6)  %


 


Baso % (Auto)   0.7   (0-2)  %


 


Absolute Neuts (auto)   3.6   (1.5-7.7)  10^3/ul


 


Absolute Lymphs (auto)   1.3   (1.0-4.8)  10^3/ul


 


Absolute Monos (auto)   0.9 H   (0-0.8)  10^3/ul


 


Absolute Eos (auto)   1.6 H   (0-0.6)  10^3/ul


 


Absolute Basos (auto)   0   (0-0.2)  10^3/ul


 


Absolute Nucleated RBC   0   10^3/ul


 


Nucleated RBC %   0.2   


 


INR (Anticoag Therapy)  1.31 H    (0.77-1.02)  


 


APTT  41.3 H    (26.0-36.3)  seconds


 


Sodium    140  (133-145)  mmol/L


 


Potassium    4.3  (3.5-5.0)  mmol/L


 


Chloride    106  (101-111)  mmol/L


 


Carbon Dioxide    28  (22-32)  mmol/L


 


Anion Gap    6  (2-11)  mmol/L


 


BUN    35 H  (6-24)  mg/dL


 


Creatinine    2.86 H  (0.67-1.17)  mg/dL


 


Est GFR ( Amer)    28.8  (>60)  


 


Est GFR (Non-Af Amer)    22.4  (>60)  


 


BUN/Creatinine Ratio    12.2  (8-20)  


 


Glucose    79  ()  mg/dL


 


Lactic Acid     (0.5-2.0)  mmol/L


 


Calcium    11.6 H  (8.6-10.3)  mg/dL


 


Phosphorus    4.2  (2.5-5.0)  mg/dL


 


Magnesium    2.0  (1.9-2.7)  mg/dL


 


Total Bilirubin    1.80 H  (0.2-1.0)  mg/dL


 


AST    15  (13-39)  U/L


 


ALT    6 L  (7-52)  U/L


 


Alkaline Phosphatase    195 H  ()  U/L


 


Troponin I    0.03  (<0.04)  ng/mL


 


B-Natriuretic Peptide    ( - 100) pg/mL


 


Total Protein    7.4  (6.4-8.9)  g/dL


 


Albumin    2.8 L  (3.2-5.2)  g/dL


 


Globulin    4.6 H  (2-4)  g/dL


 


Albumin/Globulin Ratio    0.6 L  (1-3)  














  01/25/18 01/25/18 Range/Units





  15:21 15:21 


 


WBC    (3.5-10.8)  10^3/ul


 


RBC    (4.0-5.4)  10^6/ul


 


Hgb    (14.0-18.0)  g/dl


 


Hct    (42-52)  %


 


MCV    (80-94)  fL


 


MCH    (27-31)  pg


 


MCHC    (31-36)  g/dl


 


RDW    (10.5-15)  %


 


Plt Count    (150-450)  10^3/ul


 


MPV    (7.4-10.4)  um3


 


Neut % (Auto)    (38-83)  %


 


Lymph % (Auto)    (25-47)  %


 


Mono % (Auto)    (1-9)  %


 


Eos % (Auto)    (0-6)  %


 


Baso % (Auto)    (0-2)  %


 


Absolute Neuts (auto)    (1.5-7.7)  10^3/ul


 


Absolute Lymphs (auto)    (1.0-4.8)  10^3/ul


 


Absolute Monos (auto)    (0-0.8)  10^3/ul


 


Absolute Eos (auto)    (0-0.6)  10^3/ul


 


Absolute Basos (auto)    (0-0.2)  10^3/ul


 


Absolute Nucleated RBC    10^3/ul


 


Nucleated RBC %    


 


INR (Anticoag Therapy)    (0.77-1.02)  


 


APTT    (26.0-36.3)  seconds


 


Sodium    (133-145)  mmol/L


 


Potassium    (3.5-5.0)  mmol/L


 


Chloride    (101-111)  mmol/L


 


Carbon Dioxide    (22-32)  mmol/L


 


Anion Gap    (2-11)  mmol/L


 


BUN    (6-24)  mg/dL


 


Creatinine    (0.67-1.17)  mg/dL


 


Est GFR ( Amer)    (>60)  


 


Est GFR (Non-Af Amer)    (>60)  


 


BUN/Creatinine Ratio    (8-20)  


 


Glucose    ()  mg/dL


 


Lactic Acid  1.4   (0.5-2.0)  mmol/L


 


Calcium    (8.6-10.3)  mg/dL


 


Phosphorus    (2.5-5.0)  mg/dL


 


Magnesium    (1.9-2.7)  mg/dL


 


Total Bilirubin    (0.2-1.0)  mg/dL


 


AST    (13-39)  U/L


 


ALT    (7-52)  U/L


 


Alkaline Phosphatase    ()  U/L


 


Troponin I    (<0.04)  ng/mL


 


B-Natriuretic Peptide   484 H ( - 100) pg/mL


 


Total Protein    (6.4-8.9)  g/dL


 


Albumin    (3.2-5.2)  g/dL


 


Globulin    (2-4)  g/dL


 


Albumin/Globulin Ratio    (1-3)  











Result Diagrams: 


 01/25/18 15:21





 01/25/18 15:21


Lab Statement: Any lab studies that have been ordered have been reviewed, and 

results considered in the medical decision making process.





- Radiology


  ** CXR


Xray Interpretation: Positive (See Comments) - CARDIOMEGALY WITH INTERSTITIAL 

EDEMA CONSISTENT WITH VASCULAR CONGESTION. Dr. Hillman has reviewed this report.


Radiology Interpretation Completed By: Radiologist





- EKG


  ** 15:10


Cardiac Rate: Tachycardia


EKG Interpretation: Atrial tachycardia at 103 BPM. Prolonged QRS. Normal QTc. 





Complex Multi-Symp Course/Dx


Course Of Treatment: pt presented with ams and dypsnea.  pt admitted to 

medicine for further care.  pt was admitted early in his ED stay.





- Diagnoses


Provider Diagnoses: 


 CHF (congestive heart failure), Altered mental status, unspecified








- Physician Notifications


Discussed Care Of Patient With: Flower Rose


Time Discussed With Above Provider: 16:38


Instructed by Provider To: Admit As Inpatient





Discharge





- Discharge Plan


Condition: Stable


Disposition: ADMITTED TO [facility]





The documentation as recorded by the Tonio vieyra Thomas accurately reflects 

the service I personally performed and the decisions made by Rafy pope Norma, MD.

## 2018-01-25 NOTE — RAD
Indication: Confusion.



Single frontal view of the chest performed at 1540 hours was reviewed.



Comparison is made with previous exam dated November 30, 2017.



No mediastinal shift is noted. Heart is of normal size and configuration. Interstitial

edema consistent with CHF is noted although findings are similar to that seen on November 30, 2017.



IMPRESSION: CARDIOMEGALY WITH INTERSTITIAL EDEMA CONSISTENT WITH VASCULAR CONGESTION.

## 2018-01-25 NOTE — HP
CC:  Providers at [facility]. *

 

HOSPITAL MEDICINE HISTORY AND PHYSICAL:

 

DATE OF ADMISSION:  18.

 

ATTENDING PHYSICIAN:  Marisol Smyth DO * (dictation provided by Marisela Reardon NP
).

 

CHIEF COMPLAINT:  Altered mental status.

 

HISTORY OF PRESENT ILLNESS:  Mr. Blake is a 62-year-old male resident of 
[facility], who has a history of endstage renal disease on 
hemodialysis.  Per the report from the nursing home, the patient stop taking 
dialysis two weeks ago as he "graduated" and no longer needed that service.  
The patient was doing well until yesterday when it was noted that he had an 
elevated respiratory rate.  Today, it was noted that he had concern for 
depressed mentation and therefore he was sent to the emergency room for 
evaluation.  In the emergency room, he has altered mental status and is 
arousable but not saying anything coherent and therefore is not able to provide 
any information today.

 

In the emergency room, Mr. Blake had labs which showed that his creatinine was 
2.86, which was consistent with his recent history, BUN was only 35.  His 
electrolytes were normal.  His lactic acid was 1.4, his BNP is 484, which is 
consistent with previous.  His chest x-ray showed vascular congestion, but this 
is also consistent with previous x-rays.

 

PAST MEDICAL HISTORY:

1.  Endstage renal disease, previously on hemodialysis.

2.  History of MRSA bacteremia due to dialysis catheter infection.

3.  History of congestive heart failure.

4.  Hyperammonemia.

5.  Morbid obesity.

6.  Cellulitis.

 

MEDICATIONS:

1.  Oxycodone/acetaminophen 5/325 mg one tab p.o. q. 6 hours p.r.n.

2.  Lactulose 30 mL p.o. b.i.d.

3.  Omeprazole 20 mg p.o. daily.

4.  Lactobacillus one cap p.o. q.a.m.

5.  Hydrocortisone cream b.i.d.

6.  Bicitra 15 mL at 8 a.m. b.i.d.

7.  Rifaximin 550 mg p.o. b.i.d.

8.  Guaifenesin ER 1200 mg p.o. b.i.d.

9.  Midodrine 10 mg p.o. t.i.d.

10.  Atorvastatin 10 mg p.o. daily.

11.  Chloraseptic as needed.

 

ALLERGIES:  HEPARIN, CEPHALEXIN, PENICILLIN, RIVAROXABAN, SULFA ANTIBIOTICS AND 
WARFARIN.

 

FAMILY HISTORY:  Unobtainable today.

 

SOCIAL HISTORY:  Unobtainable today.  The patient resides at [facility].

 

REVIEW OF SYSTEMS:  Unobtainable today.

 

                               PHYSICAL EXAMINATION

 

GENERAL:  Ms. Blake is lying in bed.  He does not appear to be in any distress.

 

VITAL SIGNS:  Temperature 97.5, pulse rate 102, O2 saturation 92% on room air, 
blood pressure 105/67.

 

LUNGS:  Clear to auscultation bilaterally, but very diminished and auscultation 
is quite difficult given his obesity.

 

HEART:  S1 and S2.  There is no murmur, rub, or gallop.

 

ABDOMEN:  Soft and nontender.

 

EXTREMITIES:  No cyanosis, positive for 2 to 3+ pitting edema.

 

NEUROLOGIC:  He is drowsy, but he awakens to voice.  He mumbles incoherently 
when I asked questions.  He seems to move all extremities.  His pupils are 
equal and reactive.  His face is symmetrical.

 

SKIN:  No open areas identified in the emergency department.

 

 LABORATORY DATA:  WBC 7.4, hemoglobin 11.6, hematocrit 36, and platelet count 
258. INR 1.31.  Sodium 140, potassium 4.3, chloride 106, serum bicarbonate 28, 
BUN 35, creatinine 2.86, glucose 79.  Lactic acid 1.4.  Ammonia levels pending.
  BNP is 484.

 

Chest x-ray shows chronic vascular congestion similar to previous.

 

ASSESSMENT:  Mr. Blake is a 62-year-old male who has a history of congestive 
heart failure and endstage renal disease on hemodialysis, who reportedly was 
taken off dialysis two weeks ago as he had improved significantly in terms of 
his renal function.  He presents today from [facility] with concerns that he had 
an elevated respiratory rate yesterday, now with altered mental status.  Our 
plans are for an inpatient admission as I expect his length of stay to be 
greater than 2 days for the followin.  Altered mental status:  It is not quite clear what is driving Mr. Blake's 
altered mental status at this point.  He does have an ammonia level pending as 
well as an ABG, which perhaps will be revealing.  His BUN and creatinine are 
not significantly elevated and consistent with previous.  He does not have any 
sign of infection.  He has no fever.  No leukocytosis.  Plan to follow up on 
ABG and ammonia level and continue to evaluate for any new symptomatology to 
explain his apparent encephalopathy today.  I will note during this previous 
admission to the hospital that his mental status would wax and wane to some 
degree.

2.  Endstage renal disease.  No longer on hemodialysis.  The patient's BUN and 
creatinine are essentially at his current baseline.  I see no indication for 
the resumption of dialysis at this point.  We will try to hold any nephrotoxins 
and we will review the case with Dr. Hernandez when he is available.

3.  DVT prophylaxis with heparin subcu.

4.  Code status is full code.

 

TIME SPENT:  Approximately 60 minutes were spent on the admission of this 
patient; more than half time was spent with the patient at the bedside 
reviewing the events leading up to this hospitalization, performing the 
physical examination, and reviewing my plan of care.

 

 ____________________________________ 

MARISELA REARDON NP

 

[id number]/CPS #: [id number]

MARIBEL

## 2018-01-26 LAB
BASOPHILS # BLD AUTO: 0.3 10^3/UL (ref 0–0.2)
EOSINOPHIL # BLD AUTO: 1.5 10^3/UL (ref 0–0.6)
HCT VFR BLD AUTO: 33 % (ref 42–52)
HGB BLD-MCNC: 10.6 G/DL (ref 14–18)
LYMPHOCYTES # BLD AUTO: 1.2 10^3/UL (ref 1–4.8)
MCH RBC QN AUTO: 31 PG (ref 27–31)
MCHC RBC AUTO-ENTMCNC: 32 G/DL (ref 31–36)
MCV RBC AUTO: 98 FL (ref 80–94)
MONOCYTES # BLD AUTO: 0.9 10^3/UL (ref 0–0.8)
NEUTROPHILS # BLD AUTO: 3.3 10^3/UL (ref 1.5–7.7)
NRBC # BLD AUTO: 0 10^3/UL
NRBC BLD QL AUTO: 0.1
PLATELET # BLD AUTO: 218 10^3/UL (ref 150–450)
RBC # BLD AUTO: 3.39 10^6/UL (ref 4–5.4)
WBC # BLD AUTO: 7.1 10^3/UL (ref 3.5–10.8)

## 2018-01-26 RX ADMIN — MIDODRINE HYDROCHLORIDE SCH MG: 5 TABLET ORAL at 07:57

## 2018-01-26 RX ADMIN — MIDODRINE HYDROCHLORIDE SCH MG: 5 TABLET ORAL at 20:12

## 2018-01-26 RX ADMIN — HYDROCORTISONE SCH APPLIC: 1 CREAM TOPICAL at 07:57

## 2018-01-26 RX ADMIN — ATORVASTATIN CALCIUM SCH MG: 10 TABLET, FILM COATED ORAL at 17:20

## 2018-01-26 RX ADMIN — FONDAPARINUX SODIUM SCH MG: 2.5 INJECTION, SOLUTION SUBCUTANEOUS at 10:46

## 2018-01-26 RX ADMIN — FUROSEMIDE SCH MG: 20 TABLET ORAL at 10:46

## 2018-01-26 RX ADMIN — RIFAXIMIN SCH MG: 550 TABLET ORAL at 07:57

## 2018-01-26 RX ADMIN — RIFAXIMIN SCH MG: 550 TABLET ORAL at 20:12

## 2018-01-26 RX ADMIN — GUAIFENESIN SCH MG: 600 TABLET, EXTENDED RELEASE ORAL at 20:11

## 2018-01-26 RX ADMIN — SODIUM CITRATE AND CITRIC ACID MONOHYDRATE SCH ML: 500; 334 SOLUTION ORAL at 07:56

## 2018-01-26 RX ADMIN — SODIUM CITRATE AND CITRIC ACID MONOHYDRATE SCH ML: 500; 334 SOLUTION ORAL at 20:11

## 2018-01-26 RX ADMIN — MIDODRINE HYDROCHLORIDE SCH MG: 5 TABLET ORAL at 14:28

## 2018-01-26 RX ADMIN — HYDROCORTISONE SCH APPLIC: 1 CREAM TOPICAL at 20:13

## 2018-01-26 RX ADMIN — GUAIFENESIN SCH MG: 600 TABLET, EXTENDED RELEASE ORAL at 07:56

## 2018-01-26 RX ADMIN — OMEPRAZOLE SCH MG: 20 CAPSULE, DELAYED RELEASE ORAL at 05:48

## 2018-01-27 RX ADMIN — SODIUM CITRATE AND CITRIC ACID MONOHYDRATE SCH ML: 500; 334 SOLUTION ORAL at 20:21

## 2018-01-27 RX ADMIN — MIDODRINE HYDROCHLORIDE SCH MG: 5 TABLET ORAL at 08:53

## 2018-01-27 RX ADMIN — OMEPRAZOLE SCH MG: 20 CAPSULE, DELAYED RELEASE ORAL at 08:52

## 2018-01-27 RX ADMIN — RIFAXIMIN SCH MG: 550 TABLET ORAL at 08:53

## 2018-01-27 RX ADMIN — HYDROCORTISONE SCH APPLIC: 1 CREAM TOPICAL at 08:53

## 2018-01-27 RX ADMIN — FUROSEMIDE SCH MG: 20 TABLET ORAL at 08:53

## 2018-01-27 RX ADMIN — ATORVASTATIN CALCIUM SCH MG: 10 TABLET, FILM COATED ORAL at 18:28

## 2018-01-27 RX ADMIN — MIDODRINE HYDROCHLORIDE SCH MG: 5 TABLET ORAL at 20:21

## 2018-01-27 RX ADMIN — SODIUM CITRATE AND CITRIC ACID MONOHYDRATE SCH ML: 500; 334 SOLUTION ORAL at 08:53

## 2018-01-27 RX ADMIN — GUAIFENESIN SCH MG: 600 TABLET, EXTENDED RELEASE ORAL at 20:21

## 2018-01-27 RX ADMIN — HYDROCORTISONE SCH APPLIC: 1 CREAM TOPICAL at 20:22

## 2018-01-27 RX ADMIN — GUAIFENESIN SCH MG: 600 TABLET, EXTENDED RELEASE ORAL at 08:53

## 2018-01-27 RX ADMIN — FONDAPARINUX SODIUM SCH MG: 2.5 INJECTION, SOLUTION SUBCUTANEOUS at 08:53

## 2018-01-27 RX ADMIN — MIDODRINE HYDROCHLORIDE SCH MG: 5 TABLET ORAL at 13:00

## 2018-01-27 RX ADMIN — RIFAXIMIN SCH MG: 550 TABLET ORAL at 20:21

## 2018-01-28 LAB
BASOPHILS # BLD AUTO: 0.4 10^3/UL (ref 0–0.2)
EOSINOPHIL # BLD AUTO: 1.5 10^3/UL (ref 0–0.6)
HCT VFR BLD AUTO: 29 % (ref 42–52)
HGB BLD-MCNC: 9.2 G/DL (ref 14–18)
LYMPHOCYTES # BLD AUTO: 1.5 10^3/UL (ref 1–4.8)
MCH RBC QN AUTO: 32 PG (ref 27–31)
MCHC RBC AUTO-ENTMCNC: 32 G/DL (ref 31–36)
MCV RBC AUTO: 99 FL (ref 80–94)
MONOCYTES # BLD AUTO: 1.2 10^3/UL (ref 0–0.8)
NEUTROPHILS # BLD AUTO: 4.7 10^3/UL (ref 1.5–7.7)
NRBC # BLD AUTO: 0 10^3/UL
NRBC BLD QL AUTO: 0.2
PLATELET # BLD AUTO: 229 10^3/UL (ref 150–450)
RBC # BLD AUTO: 2.93 10^6/UL (ref 4–5.4)
WBC # BLD AUTO: 9.3 10^3/UL (ref 3.5–10.8)

## 2018-01-28 RX ADMIN — ATORVASTATIN CALCIUM SCH MG: 10 TABLET, FILM COATED ORAL at 20:18

## 2018-01-28 RX ADMIN — MIDODRINE HYDROCHLORIDE SCH MG: 5 TABLET ORAL at 13:38

## 2018-01-28 RX ADMIN — GUAIFENESIN SCH MG: 600 TABLET, EXTENDED RELEASE ORAL at 20:19

## 2018-01-28 RX ADMIN — OMEPRAZOLE SCH MG: 20 CAPSULE, DELAYED RELEASE ORAL at 07:42

## 2018-01-28 RX ADMIN — HYDROCORTISONE SCH APPLIC: 1 CREAM TOPICAL at 20:19

## 2018-01-28 RX ADMIN — RIFAXIMIN SCH MG: 550 TABLET ORAL at 20:19

## 2018-01-28 RX ADMIN — MIDODRINE HYDROCHLORIDE SCH MG: 5 TABLET ORAL at 20:19

## 2018-01-28 RX ADMIN — FUROSEMIDE SCH MG: 20 TABLET ORAL at 07:42

## 2018-01-28 RX ADMIN — HYDROCORTISONE SCH APPLIC: 1 CREAM TOPICAL at 07:42

## 2018-01-28 RX ADMIN — FONDAPARINUX SODIUM SCH MG: 2.5 INJECTION, SOLUTION SUBCUTANEOUS at 07:41

## 2018-01-28 RX ADMIN — SODIUM CITRATE AND CITRIC ACID MONOHYDRATE SCH ML: 500; 334 SOLUTION ORAL at 20:19

## 2018-01-28 RX ADMIN — SODIUM CITRATE AND CITRIC ACID MONOHYDRATE SCH ML: 500; 334 SOLUTION ORAL at 07:41

## 2018-01-28 RX ADMIN — GUAIFENESIN SCH MG: 600 TABLET, EXTENDED RELEASE ORAL at 07:42

## 2018-01-28 RX ADMIN — RIFAXIMIN SCH MG: 550 TABLET ORAL at 07:42

## 2018-01-28 RX ADMIN — MIDODRINE HYDROCHLORIDE SCH MG: 5 TABLET ORAL at 07:42

## 2018-01-28 NOTE — RAD
INDICATION: Morbid obesity. Acute renal failure.     



COMPARISON: October 29, 2017

 

TECHNIQUE: Longitudinal and transverse scans of the kidneys and bladder were obtained.



FINDINGS: 



Kidneys: The right kidney measures 11.9 x 6.8 x 6.9 cm. No hydronephrosis, masses, or

characterize seen. There is nonvisualization of the left kidney. The left kidney was also

not identified at the time of earlier imaging.



Bladder: The bladder is partially distended. There are no intrinsic or extrinsic masses.

The prevoid volume is 87 ml. The patient was incontinent which did not allow for

evaluation of the postvoid residual.



Ureteral jets are not identified. This is likely related to technical factors.



Other: There is a small amount of ascites



IMPRESSION:  NORMAL RIGHT KIDNEY. NONVISUALIZATION LEFT KIDNEY. THE EXAMINATION IS LIMITED

DUE TO OBESITY.

## 2018-01-29 RX ADMIN — ATORVASTATIN CALCIUM SCH MG: 10 TABLET, FILM COATED ORAL at 18:05

## 2018-01-29 RX ADMIN — MIDODRINE HYDROCHLORIDE SCH MG: 5 TABLET ORAL at 19:58

## 2018-01-29 RX ADMIN — HYDROCORTISONE SCH APPLIC: 1 CREAM TOPICAL at 08:25

## 2018-01-29 RX ADMIN — MIDODRINE HYDROCHLORIDE SCH MG: 5 TABLET ORAL at 08:24

## 2018-01-29 RX ADMIN — GUAIFENESIN SCH MG: 600 TABLET, EXTENDED RELEASE ORAL at 19:58

## 2018-01-29 RX ADMIN — MIDODRINE HYDROCHLORIDE SCH MG: 5 TABLET ORAL at 14:15

## 2018-01-29 RX ADMIN — SODIUM CITRATE AND CITRIC ACID MONOHYDRATE SCH ML: 500; 334 SOLUTION ORAL at 08:24

## 2018-01-29 RX ADMIN — SODIUM CITRATE AND CITRIC ACID MONOHYDRATE SCH ML: 500; 334 SOLUTION ORAL at 19:59

## 2018-01-29 RX ADMIN — GUAIFENESIN SCH MG: 600 TABLET, EXTENDED RELEASE ORAL at 08:24

## 2018-01-29 RX ADMIN — RIFAXIMIN SCH MG: 550 TABLET ORAL at 19:58

## 2018-01-29 RX ADMIN — RIFAXIMIN SCH MG: 550 TABLET ORAL at 08:24

## 2018-01-29 RX ADMIN — FONDAPARINUX SODIUM SCH MG: 2.5 INJECTION, SOLUTION SUBCUTANEOUS at 08:24

## 2018-01-29 RX ADMIN — OMEPRAZOLE SCH MG: 20 CAPSULE, DELAYED RELEASE ORAL at 08:24

## 2018-01-29 RX ADMIN — HYDROCORTISONE SCH APPLIC: 1 CREAM TOPICAL at 19:59

## 2018-01-30 LAB
BASOPHILS # BLD AUTO: 0.3 10^3/UL (ref 0–0.2)
EOSINOPHIL # BLD AUTO: 1.6 10^3/UL (ref 0–0.6)
HCT VFR BLD AUTO: 36 % (ref 42–52)
HGB BLD-MCNC: 11.4 G/DL (ref 14–18)
LYMPHOCYTES # BLD AUTO: 1.3 10^3/UL (ref 1–4.8)
MCH RBC QN AUTO: 32 PG (ref 27–31)
MCHC RBC AUTO-ENTMCNC: 32 G/DL (ref 31–36)
MCV RBC AUTO: 99 FL (ref 80–94)
MONOCYTES # BLD AUTO: 0.9 10^3/UL (ref 0–0.8)
NEUTROPHILS # BLD AUTO: 3.2 10^3/UL (ref 1.5–7.7)
NRBC # BLD AUTO: 0 10^3/UL
NRBC BLD QL AUTO: 0.2
PLATELET # BLD AUTO: 185 10^3/UL (ref 150–450)
RBC # BLD AUTO: 3.58 10^6/UL (ref 4–5.4)
WBC # BLD AUTO: 7.3 10^3/UL (ref 3.5–10.8)

## 2018-01-30 RX ADMIN — GUAIFENESIN SCH MG: 600 TABLET, EXTENDED RELEASE ORAL at 19:59

## 2018-01-30 RX ADMIN — GUAIFENESIN SCH MG: 600 TABLET, EXTENDED RELEASE ORAL at 08:28

## 2018-01-30 RX ADMIN — FONDAPARINUX SODIUM SCH MG: 2.5 INJECTION, SOLUTION SUBCUTANEOUS at 08:28

## 2018-01-30 RX ADMIN — RIFAXIMIN SCH MG: 550 TABLET ORAL at 08:27

## 2018-01-30 RX ADMIN — HYDROCORTISONE SCH APPLIC: 1 CREAM TOPICAL at 20:00

## 2018-01-30 RX ADMIN — MIDODRINE HYDROCHLORIDE SCH MG: 5 TABLET ORAL at 13:20

## 2018-01-30 RX ADMIN — SODIUM CITRATE AND CITRIC ACID MONOHYDRATE SCH ML: 500; 334 SOLUTION ORAL at 20:00

## 2018-01-30 RX ADMIN — MIDODRINE HYDROCHLORIDE SCH MG: 5 TABLET ORAL at 20:00

## 2018-01-30 RX ADMIN — OMEPRAZOLE SCH MG: 20 CAPSULE, DELAYED RELEASE ORAL at 08:27

## 2018-01-30 RX ADMIN — ATORVASTATIN CALCIUM SCH MG: 10 TABLET, FILM COATED ORAL at 18:17

## 2018-01-30 RX ADMIN — MIDODRINE HYDROCHLORIDE SCH MG: 5 TABLET ORAL at 08:27

## 2018-01-30 RX ADMIN — RIFAXIMIN SCH MG: 550 TABLET ORAL at 20:00

## 2018-01-30 RX ADMIN — SODIUM CITRATE AND CITRIC ACID MONOHYDRATE SCH ML: 500; 334 SOLUTION ORAL at 08:28

## 2018-01-30 RX ADMIN — HYDROCORTISONE SCH APPLIC: 1 CREAM TOPICAL at 08:28

## 2018-01-31 RX ADMIN — MIDODRINE HYDROCHLORIDE SCH: 5 TABLET ORAL at 23:20

## 2018-01-31 RX ADMIN — GUAIFENESIN SCH MG: 600 TABLET, EXTENDED RELEASE ORAL at 23:12

## 2018-01-31 RX ADMIN — RIFAXIMIN SCH MG: 550 TABLET ORAL at 23:15

## 2018-01-31 RX ADMIN — SODIUM CITRATE AND CITRIC ACID MONOHYDRATE SCH ML: 500; 334 SOLUTION ORAL at 23:16

## 2018-01-31 RX ADMIN — MIDODRINE HYDROCHLORIDE SCH MG: 5 TABLET ORAL at 09:17

## 2018-01-31 RX ADMIN — ATORVASTATIN CALCIUM SCH MG: 10 TABLET, FILM COATED ORAL at 18:09

## 2018-01-31 RX ADMIN — FONDAPARINUX SODIUM SCH MG: 2.5 INJECTION, SOLUTION SUBCUTANEOUS at 09:25

## 2018-01-31 RX ADMIN — GUAIFENESIN SCH MG: 600 TABLET, EXTENDED RELEASE ORAL at 09:17

## 2018-01-31 RX ADMIN — OMEPRAZOLE SCH MG: 20 CAPSULE, DELAYED RELEASE ORAL at 05:59

## 2018-01-31 RX ADMIN — MIDODRINE HYDROCHLORIDE SCH MG: 5 TABLET ORAL at 14:32

## 2018-01-31 RX ADMIN — HYDROCORTISONE SCH APPLIC: 1 CREAM TOPICAL at 09:24

## 2018-01-31 RX ADMIN — SODIUM CHLORIDE SCH MLS/HR: 900 IRRIGANT IRRIGATION at 18:06

## 2018-01-31 RX ADMIN — SODIUM CITRATE AND CITRIC ACID MONOHYDRATE SCH ML: 500; 334 SOLUTION ORAL at 09:18

## 2018-01-31 RX ADMIN — HYDROCORTISONE SCH APPLIC: 1 CREAM TOPICAL at 23:19

## 2018-01-31 RX ADMIN — RIFAXIMIN SCH MG: 550 TABLET ORAL at 09:17

## 2018-02-01 RX ADMIN — SODIUM CITRATE AND CITRIC ACID MONOHYDRATE SCH ML: 500; 334 SOLUTION ORAL at 20:44

## 2018-02-01 RX ADMIN — MIDODRINE HYDROCHLORIDE SCH MG: 5 TABLET ORAL at 09:27

## 2018-02-01 RX ADMIN — MIDODRINE HYDROCHLORIDE SCH MG: 5 TABLET ORAL at 20:45

## 2018-02-01 RX ADMIN — HYDROCORTISONE SCH APPLIC: 1 CREAM TOPICAL at 20:53

## 2018-02-01 RX ADMIN — RIFAXIMIN SCH MG: 550 TABLET ORAL at 20:45

## 2018-02-01 RX ADMIN — MIDODRINE HYDROCHLORIDE SCH MG: 5 TABLET ORAL at 13:40

## 2018-02-01 RX ADMIN — GUAIFENESIN SCH MG: 600 TABLET, EXTENDED RELEASE ORAL at 20:45

## 2018-02-01 RX ADMIN — GUAIFENESIN SCH MG: 600 TABLET, EXTENDED RELEASE ORAL at 09:27

## 2018-02-01 RX ADMIN — HYDROCORTISONE SCH APPLIC: 1 CREAM TOPICAL at 09:28

## 2018-02-01 RX ADMIN — SODIUM CITRATE AND CITRIC ACID MONOHYDRATE SCH ML: 500; 334 SOLUTION ORAL at 09:27

## 2018-02-01 RX ADMIN — SODIUM CHLORIDE SCH MLS/HR: 900 IRRIGANT IRRIGATION at 04:35

## 2018-02-01 RX ADMIN — SODIUM CHLORIDE SCH MLS/HR: 900 IRRIGANT IRRIGATION at 13:46

## 2018-02-01 RX ADMIN — FONDAPARINUX SODIUM SCH MG: 2.5 INJECTION, SOLUTION SUBCUTANEOUS at 09:27

## 2018-02-01 RX ADMIN — OMEPRAZOLE SCH MG: 20 CAPSULE, DELAYED RELEASE ORAL at 09:28

## 2018-02-01 RX ADMIN — ATORVASTATIN CALCIUM SCH MG: 10 TABLET, FILM COATED ORAL at 20:45

## 2018-02-01 RX ADMIN — RIFAXIMIN SCH MG: 550 TABLET ORAL at 09:28

## 2018-02-02 RX ADMIN — SODIUM CHLORIDE SCH MLS/HR: 900 IRRIGANT IRRIGATION at 07:54

## 2018-02-02 RX ADMIN — OMEPRAZOLE SCH MG: 20 CAPSULE, DELAYED RELEASE ORAL at 06:10

## 2018-02-02 RX ADMIN — RIFAXIMIN SCH MG: 550 TABLET ORAL at 08:01

## 2018-02-02 RX ADMIN — ATORVASTATIN CALCIUM SCH MG: 10 TABLET, FILM COATED ORAL at 22:19

## 2018-02-02 RX ADMIN — FONDAPARINUX SODIUM SCH MG: 2.5 INJECTION, SOLUTION SUBCUTANEOUS at 08:02

## 2018-02-02 RX ADMIN — MIDODRINE HYDROCHLORIDE SCH MG: 5 TABLET ORAL at 13:53

## 2018-02-02 RX ADMIN — GUAIFENESIN SCH MG: 600 TABLET, EXTENDED RELEASE ORAL at 08:01

## 2018-02-02 RX ADMIN — NYSTATIN SCH APPLIC: 100000 POWDER TOPICAL at 22:25

## 2018-02-02 RX ADMIN — RIFAXIMIN SCH MG: 550 TABLET ORAL at 22:20

## 2018-02-02 RX ADMIN — MIDODRINE HYDROCHLORIDE SCH MG: 5 TABLET ORAL at 09:53

## 2018-02-02 RX ADMIN — SODIUM CITRATE AND CITRIC ACID MONOHYDRATE SCH ML: 500; 334 SOLUTION ORAL at 22:23

## 2018-02-02 RX ADMIN — DEXTROSE MONOHYDRATE AND SODIUM CHLORIDE SCH MLS/HR: 5; .9 INJECTION, SOLUTION INTRAVENOUS at 22:31

## 2018-02-02 RX ADMIN — SODIUM CITRATE AND CITRIC ACID MONOHYDRATE SCH ML: 500; 334 SOLUTION ORAL at 08:01

## 2018-02-02 RX ADMIN — GUAIFENESIN SCH MG: 600 TABLET, EXTENDED RELEASE ORAL at 22:20

## 2018-02-02 RX ADMIN — HYDROCORTISONE SCH APPLIC: 1 CREAM TOPICAL at 22:31

## 2018-02-02 RX ADMIN — HYDROCORTISONE SCH APPLIC: 1 CREAM TOPICAL at 08:03

## 2018-02-02 RX ADMIN — MIDODRINE HYDROCHLORIDE SCH MG: 5 TABLET ORAL at 22:21

## 2018-02-03 LAB
HCT VFR BLD AUTO: 38 % (ref 42–52)
HGB BLD-MCNC: 11.9 G/DL (ref 14–18)
MCH RBC QN AUTO: 32 PG (ref 27–31)
MCHC RBC AUTO-ENTMCNC: 31 G/DL (ref 31–36)
MCV RBC AUTO: 102 FL (ref 80–94)
PLATELET # BLD AUTO: 154 10^3/UL (ref 150–450)
RBC # BLD AUTO: 3.74 10^6/UL (ref 4–5.4)
WBC # BLD AUTO: 8.8 10^3/UL (ref 3.5–10.8)

## 2018-02-03 RX ADMIN — MIDODRINE HYDROCHLORIDE SCH MG: 5 TABLET ORAL at 15:00

## 2018-02-03 RX ADMIN — GUAIFENESIN SCH MG: 600 TABLET, EXTENDED RELEASE ORAL at 20:16

## 2018-02-03 RX ADMIN — NYSTATIN SCH APPLIC: 100000 POWDER TOPICAL at 15:20

## 2018-02-03 RX ADMIN — NYSTATIN SCH APPLIC: 100000 POWDER TOPICAL at 11:51

## 2018-02-03 RX ADMIN — ATORVASTATIN CALCIUM SCH MG: 10 TABLET, FILM COATED ORAL at 20:16

## 2018-02-03 RX ADMIN — HYDROCORTISONE SCH APPLIC: 1 CREAM TOPICAL at 11:51

## 2018-02-03 RX ADMIN — DEXTROSE MONOHYDRATE AND SODIUM CHLORIDE SCH MLS/HR: 5; .9 INJECTION, SOLUTION INTRAVENOUS at 11:50

## 2018-02-03 RX ADMIN — MIDODRINE HYDROCHLORIDE SCH MG: 5 TABLET ORAL at 09:30

## 2018-02-03 RX ADMIN — FONDAPARINUX SODIUM SCH MG: 2.5 INJECTION, SOLUTION SUBCUTANEOUS at 11:48

## 2018-02-03 RX ADMIN — OMEPRAZOLE SCH MG: 20 CAPSULE, DELAYED RELEASE ORAL at 11:47

## 2018-02-03 RX ADMIN — SODIUM CITRATE AND CITRIC ACID MONOHYDRATE SCH ML: 500; 334 SOLUTION ORAL at 20:16

## 2018-02-03 RX ADMIN — NYSTATIN SCH APPLIC: 100000 POWDER TOPICAL at 20:16

## 2018-02-03 RX ADMIN — HYDROCORTISONE SCH APPLIC: 1 CREAM TOPICAL at 20:16

## 2018-02-03 RX ADMIN — RIFAXIMIN SCH MG: 550 TABLET ORAL at 20:16

## 2018-02-03 RX ADMIN — CALCITRIOL SCH UNITS: 1 SOLUTION ORAL at 11:47

## 2018-02-03 RX ADMIN — SODIUM CITRATE AND CITRIC ACID MONOHYDRATE SCH ML: 500; 334 SOLUTION ORAL at 11:50

## 2018-02-03 RX ADMIN — GUAIFENESIN SCH MG: 600 TABLET, EXTENDED RELEASE ORAL at 11:50

## 2018-02-03 RX ADMIN — LORAZEPAM PRN MG: 2 INJECTION INTRAMUSCULAR; INTRAVENOUS at 20:57

## 2018-02-03 RX ADMIN — RIFAXIMIN SCH MG: 550 TABLET ORAL at 11:47

## 2018-02-03 RX ADMIN — MIDODRINE HYDROCHLORIDE SCH MG: 5 TABLET ORAL at 20:16

## 2018-02-03 NOTE — RAD
INDICATION: Cyanotic left toes



COMPARISON: None.



TECHNIQUE:  Gray scale, color Doppler, and spectral analysis utilized to image the left

lower extremity arteries. Flow velocities were determined at each visualized artery.



REPORT: 



Visualization is limited by combination of obesity and third spacing edema.



The left common femoral artery, proximal femoral profundus popliteal artery and

infrapopliteal arteries exhibit patency. There is no flow identified at the mid-level and

distal right superficial femoral artery. There is a mild velocity decrease at the

popliteal artery relative to the proximal superficial femoral artery from 136 cm/s in the

proximal superficial femoral artery down to 70 cm/s at the distal SFA/popliteal artery.





IMPRESSION:



There is no flow identified at the mid-level and distal right superficial femoral artery.

There is a mild velocity drop from the proximal superficial femoral artery to the

popliteal artery that could indicate an occlusion of the distal left SFA. Alternatively

the patient's body habitus and third spacing edema may be limiting visualization of the

left superficial femoral artery.

## 2018-02-04 LAB
HCT VFR BLD AUTO: 34 % (ref 42–52)
HGB BLD-MCNC: 10.7 G/DL (ref 14–18)
INR PPP/BLD: 1.7 (ref 0.77–1.02)
MCH RBC QN AUTO: 31 PG (ref 27–31)
MCHC RBC AUTO-ENTMCNC: 31 G/DL (ref 31–36)
MCV RBC AUTO: 99 FL (ref 80–94)
PLATELET # BLD AUTO: 146 10^3/UL (ref 150–450)
RBC # BLD AUTO: 3.46 10^6/UL (ref 4–5.4)
WBC # BLD AUTO: 10.4 10^3/UL (ref 3.5–10.8)

## 2018-02-04 PROCEDURE — 3E033XZ INTRODUCTION OF VASOPRESSOR INTO PERIPHERAL VEIN, PERCUTANEOUS APPROACH: ICD-10-PCS | Performed by: INTERNAL MEDICINE

## 2018-02-04 RX ADMIN — RIFAXIMIN SCH: 550 TABLET ORAL at 15:50

## 2018-02-04 RX ADMIN — DEXTROSE MONOHYDRATE AND SODIUM CHLORIDE SCH MLS/HR: 5; .9 INJECTION, SOLUTION INTRAVENOUS at 07:02

## 2018-02-04 RX ADMIN — NYSTATIN SCH APPLIC: 100000 POWDER TOPICAL at 20:39

## 2018-02-04 RX ADMIN — MIDODRINE HYDROCHLORIDE SCH: 5 TABLET ORAL at 15:54

## 2018-02-04 RX ADMIN — MIDODRINE HYDROCHLORIDE SCH: 5 TABLET ORAL at 15:53

## 2018-02-04 RX ADMIN — DEXTROSE MONOHYDRATE AND SODIUM CHLORIDE SCH MLS/HR: 5; .9 INJECTION, SOLUTION INTRAVENOUS at 20:00

## 2018-02-04 RX ADMIN — CALCITRIOL SCH: 1 SOLUTION ORAL at 15:52

## 2018-02-04 RX ADMIN — NYSTATIN SCH APPLIC: 100000 POWDER TOPICAL at 08:30

## 2018-02-04 RX ADMIN — MIDODRINE HYDROCHLORIDE SCH MG: 5 TABLET ORAL at 20:36

## 2018-02-04 RX ADMIN — OMEPRAZOLE SCH: 20 CAPSULE, DELAYED RELEASE ORAL at 15:48

## 2018-02-04 RX ADMIN — SODIUM CITRATE AND CITRIC ACID MONOHYDRATE SCH: 500; 334 SOLUTION ORAL at 15:50

## 2018-02-04 RX ADMIN — DEXTROSE MONOHYDRATE PRN GM: 25 INJECTION, SOLUTION INTRAVENOUS at 06:28

## 2018-02-04 RX ADMIN — RIFAXIMIN SCH: 550 TABLET ORAL at 21:15

## 2018-02-04 RX ADMIN — NYSTATIN SCH APPLIC: 100000 POWDER TOPICAL at 14:30

## 2018-02-04 RX ADMIN — GUAIFENESIN SCH: 600 TABLET, EXTENDED RELEASE ORAL at 15:49

## 2018-02-04 RX ADMIN — LORAZEPAM PRN MG: 2 INJECTION INTRAMUSCULAR; INTRAVENOUS at 20:26

## 2018-02-04 RX ADMIN — SODIUM CITRATE AND CITRIC ACID MONOHYDRATE SCH: 500; 334 SOLUTION ORAL at 21:15

## 2018-02-04 RX ADMIN — HYDROCORTISONE SCH APPLIC: 1 CREAM TOPICAL at 20:39

## 2018-02-04 RX ADMIN — ATORVASTATIN CALCIUM SCH MG: 10 TABLET, FILM COATED ORAL at 20:39

## 2018-02-04 RX ADMIN — HYDROCORTISONE SCH APPLIC: 1 CREAM TOPICAL at 14:30

## 2018-02-04 RX ADMIN — GUAIFENESIN SCH: 600 TABLET, EXTENDED RELEASE ORAL at 21:15

## 2018-02-04 NOTE — RAD
INDICATION:  Renal failure



COMPARISON:  Similar examination November 07, 2017



TECHNIQUE: Real time ultrasound images of the bilateral jugular and subclavian vein were

acquired with grey scale and Doppler color flow imaging. 



FINDINGS:



Bilaterally the jugular veins and subclavian veins exhibited color flow and normal venous

phasicity.



IMPRESSION:  

Bilaterally the jugular and subclavian veins appear to be adequately patent.

## 2018-02-05 LAB
HCT VFR BLD AUTO: 34 % (ref 42–52)
HGB BLD-MCNC: 10.7 G/DL (ref 14–18)
INR PPP/BLD: 1.87 (ref 0.77–1.02)
MCH RBC QN AUTO: 31 PG (ref 27–31)
MCHC RBC AUTO-ENTMCNC: 31 G/DL (ref 31–36)
MCV RBC AUTO: 100 FL (ref 80–94)
PLATELET # BLD AUTO: 136 10^3/UL (ref 150–450)
RBC # BLD AUTO: 3.43 10^6/UL (ref 4–5.4)
WBC # BLD AUTO: 13.1 10^3/UL (ref 3.5–10.8)

## 2018-02-05 PROCEDURE — 5A1D70Z PERFORMANCE OF URINARY FILTRATION, INTERMITTENT, LESS THAN 6 HOURS PER DAY: ICD-10-PCS | Performed by: INTERNAL MEDICINE

## 2018-02-05 RX ADMIN — FENTANYL CITRATE PRN MCG: 0.05 INJECTION, SOLUTION INTRAMUSCULAR; INTRAVENOUS at 22:18

## 2018-02-05 RX ADMIN — DEXTROSE MONOHYDRATE PRN GM: 25 INJECTION, SOLUTION INTRAVENOUS at 06:41

## 2018-02-05 RX ADMIN — ALBUMIN (HUMAN) SCH MLS/HR: 0.25 INJECTION, SOLUTION INTRAVENOUS at 14:24

## 2018-02-05 RX ADMIN — OMEPRAZOLE SCH: 20 CAPSULE, DELAYED RELEASE ORAL at 12:16

## 2018-02-05 RX ADMIN — CHLORHEXIDINE GLUCONATE 0.12% ORAL RINSE SCH ML: 1.2 LIQUID ORAL at 18:00

## 2018-02-05 RX ADMIN — LORAZEPAM PRN MG: 2 INJECTION INTRAMUSCULAR; INTRAVENOUS at 20:53

## 2018-02-05 RX ADMIN — HYDROCORTISONE SCH APPLIC: 1 CREAM TOPICAL at 09:45

## 2018-02-05 RX ADMIN — RIFAXIMIN SCH: 550 TABLET ORAL at 12:17

## 2018-02-05 RX ADMIN — GUAIFENESIN SCH: 600 TABLET, EXTENDED RELEASE ORAL at 12:16

## 2018-02-05 RX ADMIN — NOREPINEPHRINE BITARTRATE SCH MLS/HR: 1 INJECTION INTRAVENOUS at 11:02

## 2018-02-05 RX ADMIN — CHLORHEXIDINE GLUCONATE 0.12% ORAL RINSE SCH ML: 1.2 LIQUID ORAL at 13:40

## 2018-02-05 RX ADMIN — PROPOFOL SCH MLS/HR: 10 INJECTION, EMULSION INTRAVENOUS at 19:49

## 2018-02-05 RX ADMIN — NYSTATIN SCH APPLIC: 100000 POWDER TOPICAL at 09:45

## 2018-02-05 RX ADMIN — PROPOFOL SCH MLS/HR: 10 INJECTION, EMULSION INTRAVENOUS at 23:58

## 2018-02-05 RX ADMIN — NOREPINEPHRINE BITARTRATE SCH MLS/HR: 1 INJECTION INTRAVENOUS at 22:01

## 2018-02-05 RX ADMIN — NYSTATIN SCH APPLIC: 100000 POWDER TOPICAL at 13:44

## 2018-02-05 RX ADMIN — NOREPINEPHRINE BITARTRATE SCH MLS/HR: 1 INJECTION INTRAVENOUS at 14:38

## 2018-02-05 RX ADMIN — NYSTATIN SCH APPLIC: 100000 POWDER TOPICAL at 20:55

## 2018-02-05 RX ADMIN — CHLORHEXIDINE GLUCONATE 0.12% ORAL RINSE SCH ML: 1.2 LIQUID ORAL at 19:49

## 2018-02-05 RX ADMIN — HYDROCORTISONE SCH APPLIC: 1 CREAM TOPICAL at 20:55

## 2018-02-05 RX ADMIN — ATORVASTATIN CALCIUM SCH: 10 TABLET, FILM COATED ORAL at 18:03

## 2018-02-05 RX ADMIN — MIDODRINE HYDROCHLORIDE SCH: 5 TABLET ORAL at 12:17

## 2018-02-05 RX ADMIN — CALCITRIOL SCH: 1 SOLUTION ORAL at 12:17

## 2018-02-05 RX ADMIN — DEXTROSE MONOHYDRATE AND SODIUM CHLORIDE SCH MLS/HR: 5; .9 INJECTION, SOLUTION INTRAVENOUS at 09:25

## 2018-02-05 RX ADMIN — HYDROCORTISONE SODIUM SUCCINATE SCH MG: 100 INJECTION, POWDER, FOR SOLUTION INTRAMUSCULAR; INTRAVENOUS at 20:55

## 2018-02-05 RX ADMIN — SODIUM CITRATE AND CITRIC ACID MONOHYDRATE SCH: 500; 334 SOLUTION ORAL at 12:17

## 2018-02-05 RX ADMIN — PROPOFOL SCH MLS/HR: 10 INJECTION, EMULSION INTRAVENOUS at 22:35

## 2018-02-05 RX ADMIN — HYDROCORTISONE SODIUM SUCCINATE SCH MG: 100 INJECTION, POWDER, FOR SOLUTION INTRAMUSCULAR; INTRAVENOUS at 13:41

## 2018-02-05 RX ADMIN — ALBUMIN (HUMAN) SCH MLS/HR: 0.25 INJECTION, SOLUTION INTRAVENOUS at 19:26

## 2018-02-05 NOTE — RAD
INDICATION: Respiratory failure     



COMPARISON: January 25, 2018

 

TECHNIQUE: An AP portable view obtained at 1236 hours is submitted.



FINDINGS: 



Bones/Soft Tissues: There are no acute bony findings. There is an endotracheal tube in

proper position. A nasogastric tube passes normally through the mediastinum



Cardiomediastinal: The correct silhouette is not adequately evaluated due to complete

opacification left hemithorax. 



Lungs: Right hemothorax grossly clear. Complete opacification left hemithorax.



Pleura: Suspect small right-sided effusion. 



Other: None



IMPRESSION:  COMPLETE OPACIFICATION LEFT HEMITHORAX WITH MEDIASTINAL SHIFT TO THE LEFT

INDICATING VOLUME LOSS. RIGHT LUNG ESSENTIALLY CLEAR.

## 2018-02-06 LAB
BASOPHILS # BLD AUTO: 0.1 10^3/UL (ref 0–0.2)
EOSINOPHIL # BLD AUTO: 0.1 10^3/UL (ref 0–0.6)
HCT VFR BLD AUTO: 39 % (ref 42–52)
HGB BLD-MCNC: 12.2 G/DL (ref 14–18)
INR PPP/BLD: 1.96 (ref 0.77–1.02)
LYMPHOCYTES # BLD AUTO: 0.8 10^3/UL (ref 1–4.8)
MCH RBC QN AUTO: 31 PG (ref 27–31)
MCHC RBC AUTO-ENTMCNC: 31 G/DL (ref 31–36)
MCV RBC AUTO: 100 FL (ref 80–94)
MONOCYTES # BLD AUTO: 0.6 10^3/UL (ref 0–0.8)
NEUTROPHILS # BLD AUTO: 14.2 10^3/UL (ref 1.5–7.7)
NRBC # BLD AUTO: 0.1 10^3/UL
NRBC BLD QL AUTO: 0.4
PLATELET # BLD AUTO: 149 10^3/UL (ref 150–450)
RBC # BLD AUTO: 3.92 10^6/UL (ref 4–5.4)
WBC # BLD AUTO: 15.7 10^3/UL (ref 3.5–10.8)

## 2018-02-06 RX ADMIN — NOREPINEPHRINE BITARTRATE SCH MLS/HR: 1 INJECTION INTRAVENOUS at 17:35

## 2018-02-06 RX ADMIN — RIFAXIMIN SCH MG: 550 TABLET ORAL at 19:39

## 2018-02-06 RX ADMIN — ALBUMIN (HUMAN) SCH MLS/HR: 0.25 INJECTION, SOLUTION INTRAVENOUS at 01:19

## 2018-02-06 RX ADMIN — NOREPINEPHRINE BITARTRATE SCH MLS/HR: 1 INJECTION INTRAVENOUS at 07:36

## 2018-02-06 RX ADMIN — SODIUM CITRATE AND CITRIC ACID MONOHYDRATE SCH ML: 500; 334 SOLUTION ORAL at 19:39

## 2018-02-06 RX ADMIN — RIFAXIMIN SCH MG: 550 TABLET ORAL at 08:24

## 2018-02-06 RX ADMIN — NYSTATIN SCH APPLIC: 100000 POWDER TOPICAL at 13:41

## 2018-02-06 RX ADMIN — CHLORHEXIDINE GLUCONATE 0.12% ORAL RINSE SCH ML: 1.2 LIQUID ORAL at 08:11

## 2018-02-06 RX ADMIN — HYDROCORTISONE SCH APPLIC: 1 CREAM TOPICAL at 08:25

## 2018-02-06 RX ADMIN — NOREPINEPHRINE BITARTRATE SCH MLS/HR: 1 INJECTION INTRAVENOUS at 22:28

## 2018-02-06 RX ADMIN — ALBUMIN (HUMAN) SCH MLS/HR: 0.25 INJECTION, SOLUTION INTRAVENOUS at 06:55

## 2018-02-06 RX ADMIN — FENTANYL CITRATE PRN MCG: 0.05 INJECTION, SOLUTION INTRAMUSCULAR; INTRAVENOUS at 08:25

## 2018-02-06 RX ADMIN — SODIUM CITRATE AND CITRIC ACID MONOHYDRATE SCH ML: 500; 334 SOLUTION ORAL at 08:11

## 2018-02-06 RX ADMIN — CHLORHEXIDINE GLUCONATE 0.12% ORAL RINSE SCH ML: 1.2 LIQUID ORAL at 16:30

## 2018-02-06 RX ADMIN — PROPOFOL SCH MLS/HR: 10 INJECTION, EMULSION INTRAVENOUS at 04:51

## 2018-02-06 RX ADMIN — NOREPINEPHRINE BITARTRATE SCH MLS/HR: 1 INJECTION INTRAVENOUS at 20:09

## 2018-02-06 RX ADMIN — HYDROCORTISONE SODIUM SUCCINATE SCH MG: 100 INJECTION, POWDER, FOR SOLUTION INTRAMUSCULAR; INTRAVENOUS at 05:16

## 2018-02-06 RX ADMIN — CHLORHEXIDINE GLUCONATE 0.12% ORAL RINSE SCH ML: 1.2 LIQUID ORAL at 19:39

## 2018-02-06 RX ADMIN — LANSOPRAZOLE SCH MG: KIT at 08:24

## 2018-02-06 RX ADMIN — CALCITRIOL SCH UNITS: 1 SOLUTION ORAL at 08:25

## 2018-02-06 RX ADMIN — ATORVASTATIN CALCIUM SCH MG: 10 TABLET, FILM COATED ORAL at 17:36

## 2018-02-06 RX ADMIN — HYDROCORTISONE SODIUM SUCCINATE SCH MG: 100 INJECTION, POWDER, FOR SOLUTION INTRAMUSCULAR; INTRAVENOUS at 20:10

## 2018-02-06 RX ADMIN — HYDROCORTISONE SODIUM SUCCINATE SCH MG: 100 INJECTION, POWDER, FOR SOLUTION INTRAMUSCULAR; INTRAVENOUS at 13:56

## 2018-02-06 RX ADMIN — PROPOFOL SCH MLS/HR: 10 INJECTION, EMULSION INTRAVENOUS at 03:12

## 2018-02-06 RX ADMIN — PROPOFOL SCH MLS/HR: 10 INJECTION, EMULSION INTRAVENOUS at 06:53

## 2018-02-06 RX ADMIN — NOREPINEPHRINE BITARTRATE SCH MLS/HR: 1 INJECTION INTRAVENOUS at 04:52

## 2018-02-06 RX ADMIN — NOREPINEPHRINE BITARTRATE SCH MLS/HR: 1 INJECTION INTRAVENOUS at 01:17

## 2018-02-06 RX ADMIN — CHLORHEXIDINE GLUCONATE 0.12% ORAL RINSE SCH ML: 1.2 LIQUID ORAL at 11:45

## 2018-02-06 RX ADMIN — PROPOFOL SCH MLS/HR: 10 INJECTION, EMULSION INTRAVENOUS at 01:17

## 2018-02-06 RX ADMIN — CHLORHEXIDINE GLUCONATE 0.12% ORAL RINSE SCH ML: 1.2 LIQUID ORAL at 01:09

## 2018-02-06 RX ADMIN — PROPOFOL SCH MLS/HR: 10 INJECTION, EMULSION INTRAVENOUS at 10:08

## 2018-02-06 RX ADMIN — NOREPINEPHRINE BITARTRATE SCH MLS/HR: 1 INJECTION INTRAVENOUS at 13:11

## 2018-02-06 RX ADMIN — CHLORHEXIDINE GLUCONATE 0.12% ORAL RINSE SCH ML: 1.2 LIQUID ORAL at 04:05

## 2018-02-06 RX ADMIN — NOREPINEPHRINE BITARTRATE SCH MLS/HR: 1 INJECTION INTRAVENOUS at 10:11

## 2018-02-06 RX ADMIN — NOREPINEPHRINE BITARTRATE SCH MLS/HR: 1 INJECTION INTRAVENOUS at 15:34

## 2018-02-06 RX ADMIN — NYSTATIN SCH APPLIC: 100000 POWDER TOPICAL at 08:25

## 2018-02-06 NOTE — RAD
Indication: Altered mental status. History of respiratory disease/tobacco use. Cardiac

disease.



Comparison: February 05, 2018



Technique: Upright AP 0850 hours 



Report: Endotracheal tube tip 4.2 cm above the Lauren. Nasogastric tube passes to the

stomach with conspicuity of the 10th Limited. Cardiomegaly. Unremarkable central pulmonary

vasculature. Enlarged heart and leftward rotation obscures the LEFT lung base. No gross

pulmonary infiltrate or pleural effusions.



IMPRESSION: Gross resolution of LEFT lung atelectasis compared with the exam of one day

prior.

## 2018-02-07 VITALS — SYSTOLIC BLOOD PRESSURE: 93 MMHG | DIASTOLIC BLOOD PRESSURE: 48 MMHG

## 2018-02-07 LAB
BASOPHILS # BLD AUTO: 0 10^3/UL (ref 0–0.2)
EOSINOPHIL # BLD AUTO: 0.6 10^3/UL (ref 0–0.6)
HCT VFR BLD AUTO: 42 % (ref 42–52)
HCT VFR BLD AUTO: 47 % (ref 42–52)
HGB BLD-MCNC: 12 G/DL (ref 14–18)
HGB BLD-MCNC: 13 G/DL (ref 14–18)
INR PPP/BLD: 2.53 (ref 0.77–1.02)
LYMPHOCYTES # BLD AUTO: 1.9 10^3/UL (ref 1–4.8)
MCH RBC QN AUTO: 31 PG (ref 27–31)
MCHC RBC AUTO-ENTMCNC: 29 G/DL (ref 31–36)
MCV RBC AUTO: 107 FL (ref 80–94)
MONOCYTES # BLD AUTO: 0.8 10^3/UL (ref 0–0.8)
NEUTROPHILS # BLD AUTO: 16 10^3/UL (ref 1.5–7.7)
NRBC # BLD AUTO: 0.2 10^3/UL
NRBC BLD QL AUTO: 1.1
PLATELET # BLD AUTO: 136 10^3/UL (ref 150–450)
RBC # BLD AUTO: 3.89 10^6/UL (ref 4–5.4)
WBC # BLD AUTO: 19.3 10^3/UL (ref 3.5–10.8)

## 2018-02-07 RX ADMIN — CHLORHEXIDINE GLUCONATE 0.12% ORAL RINSE SCH ML: 1.2 LIQUID ORAL at 08:18

## 2018-02-07 RX ADMIN — NYSTATIN SCH APPLIC: 100000 POWDER TOPICAL at 13:53

## 2018-02-07 RX ADMIN — MORPHINE SULFATE PRN MG: 10 INJECTION, SOLUTION INTRAMUSCULAR; INTRAVENOUS at 20:10

## 2018-02-07 RX ADMIN — DEXTROSE MONOHYDRATE PRN GM: 25 INJECTION, SOLUTION INTRAVENOUS at 02:03

## 2018-02-07 RX ADMIN — RIFAXIMIN SCH: 550 TABLET ORAL at 08:18

## 2018-02-07 RX ADMIN — CALCITRIOL SCH UNITS: 1 SOLUTION ORAL at 10:00

## 2018-02-07 RX ADMIN — LANSOPRAZOLE SCH MG: KIT at 09:57

## 2018-02-07 RX ADMIN — NOREPINEPHRINE BITARTRATE SCH: 1 INJECTION INTRAVENOUS at 22:25

## 2018-02-07 RX ADMIN — DEXTROSE MONOHYDRATE PRN GM: 25 INJECTION, SOLUTION INTRAVENOUS at 01:53

## 2018-02-07 RX ADMIN — NYSTATIN SCH APPLIC: 100000 POWDER TOPICAL at 11:51

## 2018-02-07 RX ADMIN — HYDROCORTISONE SODIUM SUCCINATE SCH MG: 100 INJECTION, POWDER, FOR SOLUTION INTRAMUSCULAR; INTRAVENOUS at 05:44

## 2018-02-07 RX ADMIN — NYSTATIN SCH: 100000 POWDER TOPICAL at 22:25

## 2018-02-07 RX ADMIN — HYDROCORTISONE SCH: 1 CREAM TOPICAL at 22:25

## 2018-02-07 RX ADMIN — SODIUM CITRATE AND CITRIC ACID MONOHYDRATE SCH ML: 500; 334 SOLUTION ORAL at 08:18

## 2018-02-07 RX ADMIN — HYDROCORTISONE SODIUM SUCCINATE SCH: 100 INJECTION, POWDER, FOR SOLUTION INTRAMUSCULAR; INTRAVENOUS at 22:25

## 2018-02-07 RX ADMIN — NOREPINEPHRINE BITARTRATE SCH MLS/HR: 1 INJECTION INTRAVENOUS at 11:48

## 2018-02-07 RX ADMIN — SODIUM CITRATE AND CITRIC ACID MONOHYDRATE SCH: 500; 334 SOLUTION ORAL at 20:01

## 2018-02-07 RX ADMIN — NOREPINEPHRINE BITARTRATE SCH MLS/HR: 1 INJECTION INTRAVENOUS at 15:52

## 2018-02-07 RX ADMIN — CHLORHEXIDINE GLUCONATE 0.12% ORAL RINSE SCH ML: 1.2 LIQUID ORAL at 13:43

## 2018-02-07 RX ADMIN — NYSTATIN SCH: 100000 POWDER TOPICAL at 01:37

## 2018-02-07 RX ADMIN — ATORVASTATIN CALCIUM SCH: 10 TABLET, FILM COATED ORAL at 19:27

## 2018-02-07 RX ADMIN — RIFAXIMIN SCH: 550 TABLET ORAL at 20:01

## 2018-02-07 RX ADMIN — HYDROCORTISONE SCH APPLIC: 1 CREAM TOPICAL at 12:24

## 2018-02-07 RX ADMIN — MORPHINE SULFATE PRN MG: 10 INJECTION, SOLUTION INTRAMUSCULAR; INTRAVENOUS at 18:31

## 2018-02-07 RX ADMIN — CHLORHEXIDINE GLUCONATE 0.12% ORAL RINSE SCH ML: 1.2 LIQUID ORAL at 05:44

## 2018-02-07 RX ADMIN — CHLORHEXIDINE GLUCONATE 0.12% ORAL RINSE SCH ML: 1.2 LIQUID ORAL at 17:56

## 2018-02-07 RX ADMIN — CHLORHEXIDINE GLUCONATE 0.12% ORAL RINSE SCH: 1.2 LIQUID ORAL at 20:01

## 2018-02-07 RX ADMIN — HYDROCORTISONE SODIUM SUCCINATE SCH MG: 100 INJECTION, POWDER, FOR SOLUTION INTRAMUSCULAR; INTRAVENOUS at 13:44

## 2018-02-07 RX ADMIN — HYDROCORTISONE SCH: 1 CREAM TOPICAL at 01:37

## 2018-02-07 RX ADMIN — CHLORHEXIDINE GLUCONATE 0.12% ORAL RINSE SCH: 1.2 LIQUID ORAL at 01:37

## 2018-02-09 NOTE — DS
CC:  Providers at [facility].

 

DISCHARGE SUMMARY/EXPIRATION SUMMARY:

 

DATE OF ADMISSION:  01/25/18

 

DATE OF EXPIRATION:  02/07/18

 

TIME OF EXPIRATION:  9 p.m.

 

PRIMARY CARE PHYSICIAN:  Providers at [facility].

 

REASON FOR DEATH:  Cardiopulmonary arrest.

 

CHIEF COMPLAINT FOR ADMISSION:  Altered mental status.

 

BRIEF SUMMARY OF VISIT:  The patient is 62-year-old male resident of [facility] with endstage renal disease on hemodialysis, congestive heart 
failure, liver failure, hyperammonemia, cellulitis who was admitted for 
evaluation of altered mental status, was found to be having worsening renal 
function in the setting of stopping of dialysis with significant acidosis.  His 
mental status has worsened while he was in the hospital and was transferred for 
ICU for further management.  Please refer to admission history and physical for 
further details. The patient continued to get worse during hospitalization.  He 
was found to have multi-organ dysfunction, worsening of hypoxemic and 
hypercapnic respiratory failure requiring intubation.  The patient was 
intubated and initiated on mechanical ventilation on 02/05/18.  The patient 
also had hemodialysis catheter placed for dialysis.  He was also being treated 
with broad-spectrum antibiotics for sepsis. In spite of maximal support he 
continued to deteriorate.  He was on maximal doses of vasopressors.  He 
continued to show signs of multisystem organ failure.  He was initiated on 
bicarbonate drip for worsening acidosis.  His mental status worsened 
significantly.  He did not respond to any therapy.  The patient's family was 
updated of worsening status.  His daughter arrived from [place].  She is 
elder daughter, signed MOLST forms and wished for DNR and terminal extubation.  
The patient was terminally extubated on 02/07/18 around 8 p.m.  He passed away 
soon after extubation.  His vasopressor drips were also held as per family's 
whishes. The patient was pronounced dead at 9 p.m. on 02/07/18. Refer to 
progress notes and other documentation during the hospitalization.

 

This is a brief summary of his stay from 01/25/18 to 02/07/18. Please refer to 
daily progress notes for further details of hospital course

 

 [id number]/CPS #: [id number]

MTDD

## 2019-07-17 NOTE — RAD
INDICATION:  Altered mental status



COMPARISON: CT the brain November 05, 2017



TECHNIQUE: Contiguous axial sections of the brain were obtained from the skull base to the

vertex without contrast.



FINDINGS: 



The ventricles, cisterns and sulci are within normal limits.  



    The gray-white matter differentiation is adequately maintained and there is no sulcal

effacement. No significant focal abnormality or mass effect is present. 



There is no evidence for intracranial hemorrhage.



No significant focal osseous abnormality is present. 



There is moderate mucosal thickening of the bilateral ethmoid air cells with trace

dependent fluid in the bilateral maxillary sinuses.



The mastoid air cells are well aerated bilaterally.



IMPRESSION:  Interval development of paranasal sinus mucosal disease without CT apparent

acute intracranial abnormality. - hemoglobin around 13 during last admission. possibly trended down post op  - ?utility of fobt in setting of recent colon resection.   - check am cbc  - no S&S active bleeding  - if continues to trend down hold pharmacologic dvt ppx.

## 2020-08-26 NOTE — PN
Date of Service: 02/06/18 - [facility] progress note


Critical Care Services: 





Pt seen and examined at bedside. His condition has been worsening since last 

night. He has been needing full support with mechanical ventilation, has been 

needing 2 vasopressors at maximal dose, no purposeful movements since sedation 

has been on hold. Ex-wife at bedside





Vital Signs: 











Temp Pulse Resp BP SpO2 FiO2


 


98.8 F 69 13 81/36 97 40


 


02/06/18 12:00 02/06/18 15:00 02/06/18 13:00 02/06/18 15:00 02/06/18 15:00 02/06 /18 11:58











Physical Exam: 


Neurologic: obtunded to stupor, withdraws ext to pain. 


HEENT: Pupils equal, sclera anicteric, trachea midline, MM dry with crusted 

blood


Cardiovascular: S1, S2+, tachycardic


Respiratory: Decreased BS at bases L>R


Abdomen: Soft, obese,anasarca. old scarring


Extremities: cool, edematous with chronic changes


Skin: Sacral decub +





Fluid Balance (Past 24 Hours): 


H=2770     O=100     Net 3176


 Intake & Output











 02/04/18 02/05/18 02/06/18 02/07/18





 06:59 06:59 06:59 06:59


 


Intake Total 1718 1970 3276 


 


Output Total 800 1600 100 0


 


Balance  0


 


Weight 319 lb 7.197 oz 317 lb 7.45 oz 313 lb 11.485 oz 


 


Intake:    


 


  IV Fluids 1298 1790 659 


 


    D5W NS (0.9%) 1298 1790 600 


 


    ns   59 


 


  Medicated IV   2557 


 


    CC - Norepinephrine/   1438 





    Levophed    


 


    CC - Propofol/Diprivan   709 


 


    GEN - Albumin   410 


 


  Oral 420 180  


 


  NG Tube Irrigate Amount   60 


 


Output:    


 


  Urine  0 0 0


 


  Liquid Stool 800 1600 100 


 


Other:    


 


  Estimated Void Large   


 


  # Voids 1   





 














Labs: 


 Laboratory Results - last 24 hr











  02/05/18 02/05/18 02/05/18





  15:17 18:29 23:39


 


WBC   


 


RBC   


 


Hgb   


 


Hct   


 


MCV   


 


MCH   


 


MCHC   


 


RDW   


 


Plt Count   


 


MPV   


 


Neut % (Auto)   


 


Lymph % (Auto)   


 


Mono % (Auto)   


 


Eos % (Auto)   


 


Baso % (Auto)   


 


Absolute Neuts (auto)   


 


Absolute Lymphs (auto)   


 


Absolute Monos (auto)   


 


Absolute Eos (auto)   


 


Absolute Basos (auto)   


 


Absolute Nucleated RBC   


 


Nucleated RBC %   


 


INR (Anticoag Therapy)   


 


Sodium   


 


Potassium   


 


Chloride   


 


Carbon Dioxide   


 


Anion Gap   


 


BUN   


 


Creatinine   


 


Est GFR ( Amer)   


 


Est GFR (Non-Af Amer)   


 


BUN/Creatinine Ratio   


 


Glucose   


 


POC Glucose (mg/dL)  81  83  71


 


Calcium   


 


Phosphorus   


 


Magnesium   


 


Total Bilirubin   


 


AST   


 


ALT   


 


Alkaline Phosphatase   


 


Ammonia   


 


Total Protein   


 


Albumin   


 


Globulin   


 


Albumin/Globulin Ratio   














  02/06/18 02/06/18 02/06/18





  05:25 05:25 05:25


 


WBC  15.7 H  


 


RBC  3.92 L  


 


Hgb  12.2 L  


 


Hct  39 L  


 


MCV  100 H  


 


MCH  31  


 


MCHC  31  


 


RDW  19 H  


 


Plt Count  149 L  


 


MPV  8  


 


Neut % (Auto)  90.3 H  


 


Lymph % (Auto)  5.0 L  


 


Mono % (Auto)  3.9  


 


Eos % (Auto)  0.5  


 


Baso % (Auto)  0.3  


 


Absolute Neuts (auto)  14.2 H  


 


Absolute Lymphs (auto)  0.8 L  


 


Absolute Monos (auto)  0.6  


 


Absolute Eos (auto)  0.1  


 


Absolute Basos (auto)  0.1  


 


Absolute Nucleated RBC  0.1  


 


Nucleated RBC %  0.4  


 


INR (Anticoag Therapy)   1.96 H 


 


Sodium    140


 


Potassium    TNP


 


Chloride    108


 


Carbon Dioxide    17 L


 


Anion Gap    15 H


 


BUN    45 H


 


Creatinine    4.96 H


 


Est GFR ( Amer)    15.2


 


Est GFR (Non-Af Amer)    11.9


 


BUN/Creatinine Ratio    9.1


 


Glucose    74


 


POC Glucose (mg/dL)   


 


Calcium    10.2


 


Phosphorus    4.6


 


Magnesium    1.9


 


Total Bilirubin    3.70 H D


 


AST    TNP


 


ALT    5 L


 


Alkaline Phosphatase    124 H


 


Ammonia   


 


Total Protein    6.9


 


Albumin    3.1 L


 


Globulin    3.8


 


Albumin/Globulin Ratio    0.8 L














  02/06/18





  05:25


 


WBC 


 


RBC 


 


Hgb 


 


Hct 


 


MCV 


 


MCH 


 


MCHC 


 


RDW 


 


Plt Count 


 


MPV 


 


Neut % (Auto) 


 


Lymph % (Auto) 


 


Mono % (Auto) 


 


Eos % (Auto) 


 


Baso % (Auto) 


 


Absolute Neuts (auto) 


 


Absolute Lymphs (auto) 


 


Absolute Monos (auto) 


 


Absolute Eos (auto) 


 


Absolute Basos (auto) 


 


Absolute Nucleated RBC 


 


Nucleated RBC % 


 


INR (Anticoag Therapy) 


 


Sodium 


 


Potassium 


 


Chloride 


 


Carbon Dioxide 


 


Anion Gap 


 


BUN 


 


Creatinine 


 


Est GFR ( Amer) 


 


Est GFR (Non-Af Amer) 


 


BUN/Creatinine Ratio 


 


Glucose 


 


POC Glucose (mg/dL) 


 


Calcium 


 


Phosphorus 


 


Magnesium 


 


Total Bilirubin 


 


AST 


 


ALT 


 


Alkaline Phosphatase 


 


Ammonia  73 H


 


Total Protein 


 


Albumin 


 


Globulin 


 


Albumin/Globulin Ratio 











Impression: 





MSOF


Acute hypoxic and hypercapnic respiratory failure


Hypotension/Shock requiring vasopressor support


Hepatic encephalopathy stage IV


Acute on chronic renal failure - now anuric. 


GARCIA


Hypercalcemia


Morbid obesity 


Sacral decubitus unstagable at present


Relative adrenal insufficiency 


Malnutrition mod to severe degree





Plan: 





Neurologic: s/p intubation after failing NIPPV yesterday. Metabolic 

encephalopathy, no change in mental status even after holding sedation for hrs, 

continue lactulose and rifaximin. Brain CT reviewed- npa cute changes 


Cardiovascular: Worsening hypotension, 3rd spacing, needing 2 vasopressors, 

albumin prn, empiric steroids. 


Respiratory:S/p intubation yesterday, c/w mech vent, tolerating APRV, O2 status 

stable with minimal FiO2 requirement


Gastrointestinal: c/w ogt, c/w Rifaximin, albumin 


Renal/Metabolic: Plan to place HD cath if pt appears to be in stable situation 

to tolerate it. His condition has been worsening since yesterday and I do not 

anticipate recovery looking at his current status as condition appears to be 

only deteriorating. c/w calcitonin rx. 


Infectious Disease: no real infective source but at risk. On GI px with 

Rifaximin


Hematology: Low plt, coags high sec to liver failure, no active bleeding


Endocrine: C/w empiric stress dose steroids. 


Musculoskeletal: Decub/wound care


Psych/Social: Family meeting today with daughter(HCP), son, and ex-wife to 

discuss advance directives. 





Supportive and preventative care as ordered.


SUP: PPI


VTE prophylaxis: holding heparin given low platelets and high INR 


Disposition: ICU; prognosis poor


Code Status: Full presently, family and HCP to make decision as he has wished 

for DNR status as per son in the past





Critical Care Time: 35min, excluding procedures, additional 30 min with family 

meeting(conference call) and discussion of advanced directives.
Date of Service: 02/07/18 - [facility] note


Critical Care Services: 





Pt seen and examined at bedside. Overnight events noted. Pt continues to 

deteriorate. Had GI bleed last night, acidosis worsening, remains hypotensive 

with 2 pressors, fluid boluses and albumin


Vital Signs: 











Temp Pulse Resp BP SpO2 FiO2


 


98.6 F 69 12 99/48 99 30


 


02/07/18 07:41 02/07/18 08:00 02/07/18 08:00 02/07/18 08:00 02/07/18 08:00 02/07 /18 08:00











Physical Exam: 


O/E:





Neurologic: No response to stimuli, has been off sedation, no purposeful 

movements. 


HEENT: Pupils equal, sclera anicteric, trachea midline, MM dry with crusted 

blood, OGT, ETT in place


Cardiovascular: S1, S2+, tachycardic


Respiratory: Decreased BS at bases L>R


Abdomen: Soft, obese,anasarca. old scarring


Extremities: cool, edematous with chronic skin changes, no cellulitis


Skin: Sacral decub +








Fluid Balance (Past 24 Hours): 


I= 3752    O= 100    Net 3652


 Intake & Output











 02/05/18 02/06/18 02/07/18 02/08/18





 06:59 06:59 06:59 06:59


 


Intake Total 1970 3276 3752.6 


 


Output Total 1600 100 100 


 


Balance 370 3176 3652.6 


 


Weight 317 lb 7.45 oz 313 lb 11.485 oz 325 lb 6.436 oz 


 


Intake:    


 


  IV Fluids 1790 659 495 


 


    D5W NS (0.9%) 1790 600  


 


    albumin   495 


 


    ns  59  


 


  IVPB   238 


 


    albumin   238 


 


  Medicated IV  2557 2959.6 


 


    CC - Norepinephrine/  1438 2551 





    Levophed    


 


    CC - Propofol/Diprivan  709 119 


 


    CC - Vasopressin/   77.6 





    Pitressin    


 


    GEN - Albumin  410  


 


    Sodium Bicarbonate   212 


 


  Oral 180  0 


 


  Tube Feeding Flush Amount   60 


 


  NG Tube Irrigate Amount  60  


 


Output:    


 


  Urine 0 0 0 


 


  Liquid Stool 1600 100 100 





 














Labs: 


 Laboratory Results - last 24 hr











  02/05/18 02/06/18 02/06/18





  23:39 11:49 17:40


 


WBC   


 


RBC   


 


Hgb   


 


Hct   


 


MCV   


 


MCH   


 


MCHC   


 


RDW   


 


Plt Count   


 


MPV   


 


Neut % (Auto)   


 


Lymph % (Auto)   


 


Mono % (Auto)   


 


Eos % (Auto)   


 


Baso % (Auto)   


 


Absolute Neuts (auto)   


 


Absolute Lymphs (auto)   


 


Absolute Monos (auto)   


 


Absolute Eos (auto)   


 


Absolute Basos (auto)   


 


Absolute Nucleated RBC   


 


Nucleated RBC %   


 


INR (Anticoag Therapy)   


 


VBG pH   


 


VBG pCO2   


 


VBG pO2   


 


VBG HCO3   


 


VBG O2 Saturation   


 


VBG Base Excess   


 


Sodium   


 


Potassium   


 


Chloride   


 


Carbon Dioxide   


 


Anion Gap   


 


BUN   


 


Creatinine   


 


Est GFR ( Amer)   


 


Est GFR (Non-Af Amer)   


 


BUN/Creatinine Ratio   


 


Glucose   


 


POC Glucose (mg/dL)  71  81  74


 


Lactic Acid   


 


Calcium   


 


Phosphorus   


 


Magnesium   


 


Total Bilirubin   


 


AST   


 


ALT   


 


Alkaline Phosphatase   


 


Ammonia   


 


Total Protein   


 


Albumin   


 


Globulin   


 


Albumin/Globulin Ratio   














  02/07/18 02/07/18 02/07/18





  01:10 01:14 01:28


 


WBC   


 


RBC   


 


Hgb  13.0 L  


 


Hct  47  


 


MCV   


 


MCH   


 


MCHC   


 


RDW   


 


Plt Count   


 


MPV   


 


Neut % (Auto)   


 


Lymph % (Auto)   


 


Mono % (Auto)   


 


Eos % (Auto)   


 


Baso % (Auto)   


 


Absolute Neuts (auto)   


 


Absolute Lymphs (auto)   


 


Absolute Monos (auto)   


 


Absolute Eos (auto)   


 


Absolute Basos (auto)   


 


Absolute Nucleated RBC   


 


Nucleated RBC %   


 


INR (Anticoag Therapy)   


 


VBG pH   


 


VBG pCO2   


 


VBG pO2   


 


VBG HCO3   


 


VBG O2 Saturation   


 


VBG Base Excess   


 


Sodium   


 


Potassium   


 


Chloride   


 


Carbon Dioxide   


 


Anion Gap   


 


BUN   


 


Creatinine   


 


Est GFR ( Amer)   


 


Est GFR (Non-Af Amer)   


 


BUN/Creatinine Ratio   


 


Glucose   


 


POC Glucose (mg/dL)   34 L* 


 


Lactic Acid   


 


Calcium   


 


Phosphorus   


 


Magnesium   


 


Total Bilirubin   


 


AST   


 


ALT   


 


Alkaline Phosphatase   


 


Ammonia    134 H


 


Total Protein   


 


Albumin   


 


Globulin   


 


Albumin/Globulin Ratio   














  02/07/18 02/07/18 02/07/18





  01:28 01:31 02:00


 


WBC   


 


RBC   


 


Hgb   


 


Hct   


 


MCV   


 


MCH   


 


MCHC   


 


RDW   


 


Plt Count   


 


MPV   


 


Neut % (Auto)   


 


Lymph % (Auto)   


 


Mono % (Auto)   


 


Eos % (Auto)   


 


Baso % (Auto)   


 


Absolute Neuts (auto)   


 


Absolute Lymphs (auto)   


 


Absolute Monos (auto)   


 


Absolute Eos (auto)   


 


Absolute Basos (auto)   


 


Absolute Nucleated RBC   


 


Nucleated RBC %   


 


INR (Anticoag Therapy)   


 


VBG pH   


 


VBG pCO2   


 


VBG pO2   


 


VBG HCO3   


 


VBG O2 Saturation   


 


VBG Base Excess   


 


Sodium  141  


 


Potassium  4.5  


 


Chloride  109  


 


Carbon Dioxide  10 L*  


 


Anion Gap  22 H  


 


BUN  49 H  


 


Creatinine  5.61 H  


 


Est GFR ( Amer)  13.2  


 


Est GFR (Non-Af Amer)  10.3  


 


BUN/Creatinine Ratio  8.7  


 


Glucose  18 L*  


 


POC Glucose (mg/dL)   29 L*  56 L


 


Lactic Acid   


 


Calcium  10.1  


 


Phosphorus  7.2 H  


 


Magnesium  2.0  


 


Total Bilirubin  3.60 H  


 


AST  96 H  


 


ALT  15  


 


Alkaline Phosphatase  109 H  


 


Ammonia   


 


Total Protein  6.8  


 


Albumin  3.3  


 


Globulin  3.5  


 


Albumin/Globulin Ratio  0.9 L  














  02/07/18 02/07/18 02/07/18





  02:42 02:43 03:59


 


WBC   


 


RBC   


 


Hgb   


 


Hct   


 


MCV   


 


MCH   


 


MCHC   


 


RDW   


 


Plt Count   


 


MPV   


 


Neut % (Auto)   


 


Lymph % (Auto)   


 


Mono % (Auto)   


 


Eos % (Auto)   


 


Baso % (Auto)   


 


Absolute Neuts (auto)   


 


Absolute Lymphs (auto)   


 


Absolute Monos (auto)   


 


Absolute Eos (auto)   


 


Absolute Basos (auto)   


 


Absolute Nucleated RBC   


 


Nucleated RBC %   


 


INR (Anticoag Therapy)   


 


VBG pH   


 


VBG pCO2   


 


VBG pO2   


 


VBG HCO3   


 


VBG O2 Saturation   


 


VBG Base Excess   


 


Sodium   


 


Potassium   


 


Chloride   


 


Carbon Dioxide   


 


Anion Gap   


 


BUN   


 


Creatinine   


 


Est GFR ( Amer)   


 


Est GFR (Non-Af Amer)   


 


BUN/Creatinine Ratio   


 


Glucose   


 


POC Glucose (mg/dL)  90   68 L


 


Lactic Acid   10.4 H* 


 


Calcium   


 


Phosphorus   


 


Magnesium   


 


Total Bilirubin   


 


AST   


 


ALT   


 


Alkaline Phosphatase   


 


Ammonia   


 


Total Protein   


 


Albumin   


 


Globulin   


 


Albumin/Globulin Ratio   














  02/07/18 02/07/18 02/07/18





  05:25 05:30 05:30


 


WBC   19.3 H 


 


RBC   3.89 L 


 


Hgb   12.0 L 


 


Hct   42 


 


MCV   107 H 


 


MCH   31 


 


MCHC   29 L 


 


RDW   19 H 


 


Plt Count   136 L 


 


MPV   9 


 


Neut % (Auto)   82.9 


 


Lymph % (Auto)   9.7 L 


 


Mono % (Auto)   4.2 


 


Eos % (Auto)   3.0 


 


Baso % (Auto)   0.2 


 


Absolute Neuts (auto)   16.0 H 


 


Absolute Lymphs (auto)   1.9 


 


Absolute Monos (auto)   0.8 


 


Absolute Eos (auto)   0.6 


 


Absolute Basos (auto)   0 


 


Absolute Nucleated RBC   0.2 


 


Nucleated RBC %   1.1 


 


INR (Anticoag Therapy)    2.53 H


 


VBG pH   


 


VBG pCO2   


 


VBG pO2   


 


VBG HCO3   


 


VBG O2 Saturation   


 


VBG Base Excess   


 


Sodium   


 


Potassium   


 


Chloride   


 


Carbon Dioxide   


 


Anion Gap   


 


BUN   


 


Creatinine   


 


Est GFR ( Amer)   


 


Est GFR (Non-Af Amer)   


 


BUN/Creatinine Ratio   


 


Glucose   


 


POC Glucose (mg/dL)  122 H  


 


Lactic Acid   


 


Calcium   


 


Phosphorus   


 


Magnesium   


 


Total Bilirubin   


 


AST   


 


ALT   


 


Alkaline Phosphatase   


 


Ammonia   


 


Total Protein   


 


Albumin   


 


Globulin   


 


Albumin/Globulin Ratio   














  02/07/18 02/07/18





  05:30 05:30


 


WBC  


 


RBC  


 


Hgb  


 


Hct  


 


MCV  


 


MCH  


 


MCHC  


 


RDW  


 


Plt Count  


 


MPV  


 


Neut % (Auto)  


 


Lymph % (Auto)  


 


Mono % (Auto)  


 


Eos % (Auto)  


 


Baso % (Auto)  


 


Absolute Neuts (auto)  


 


Absolute Lymphs (auto)  


 


Absolute Monos (auto)  


 


Absolute Eos (auto)  


 


Absolute Basos (auto)  


 


Absolute Nucleated RBC  


 


Nucleated RBC %  


 


INR (Anticoag Therapy)  


 


VBG pH   < 7.00 L


 


VBG pCO2   51


 


VBG pO2   52 H


 


VBG HCO3   5.8 L


 


VBG O2 Saturation   84.4 H


 


VBG Base Excess   -24.0 L


 


Sodium  


 


Potassium  


 


Chloride  


 


Carbon Dioxide  


 


Anion Gap  


 


BUN  


 


Creatinine  


 


Est GFR ( Amer)  


 


Est GFR (Non-Af Amer)  


 


BUN/Creatinine Ratio  


 


Glucose  


 


POC Glucose (mg/dL)  


 


Lactic Acid  9.7 H* 


 


Calcium  


 


Phosphorus  


 


Magnesium  


 


Total Bilirubin  


 


AST  


 


ALT  


 


Alkaline Phosphatase  


 


Ammonia  


 


Total Protein  


 


Albumin  


 


Globulin  


 


Albumin/Globulin Ratio  











Nutrition: 





OGT feeds, on hold this am sec to upper GI bleed


Impression: 





1. MSOF with signficant deterioration


2. Acute hypoxic and hypercapnic respiratory failure, no signficant change


3. Hypotension/Shock requiring vasopressor support, worsening, maxed out on 2 

pressors, receiving fluid boluses


4. Hepatic encephalopathy stage IV, ammonia levels increasing


5. Acute on chronic renal failure - now anuric, HD on hold given current 

status. 


6. GARCIA


7. Hypercalcemia


8. Morbid obesity 


9. Sacral decubitus unstagable at present


10. Relative adrenal insufficiency 


12. Lactic and AG metabolic acidosis


13. Acute GI bleed


14. Hypoglycemia





Plan: 








1. Neurologic: s/p intubation, resp mechanics acceptable, Metabolic 

encephalopathy, no change in mental status, has not been requiring sedation. 

NH3 trending up, continue lactulose and rifaximin. Brain CT reviewed- no acute 

changes consistent with metabolic encephalopathy


Cardiovascular: Worsening hypotension, 3rd spacing, on max dose of 2 

vasopressors, albumin, fluid boluses, stress dose steroids. 


Respiratory:S/p intubation, c/w mech vent, tolerating APRV, O2 status stable 

with minimal FiO2 requirements, pulm toilet


Gastrointestinal: c/w OGT, had 400cc blood yesterday through OGT, c/w PPI, 

Rifaximin, albumin 


Renal/Metabolic: Had HD cath in [place]. His condition has been worsening and is 

not stable at this time for HD. Acidosis, renal function has been worsening. On 

bicarb drip. c/w calcitonin rx. 


Infectious Disease: no real infective source but at risk. On GI px with 

Rifaximin


Hematology: Low plt, coags high sec to liver failure, upper GI bleed last night

, H&H stable, being closely monitored


Endocrine: C/w empiric stress dose steroids. Moniotr BS closely, has needed 

dextrose yesterday


Musculoskeletal: Decub/wound care


Psych/Social: Family meeting yesterday with daughter(HCP), son, and ex-wife to 

discuss advance directives. Daughter lives in [place], on her way here, she is HCP, 

would want to continue with full code status, understands poor prognosis but 

believes in miracles.





Supportive and preventative care as ordered.


SUP: PPI


VTE prophylaxis: holding heparin given low platelets and high INR 


Disposition: ICU; prognosis poor


Code Status: Full presently, family and HCP(daughter) to make decision as he 

has wished for DNR status as per son in the past. Daughter to arrive later today





Critical Care Time: 35min
Hospitalist Progress Note


Date of Service: 02/02/18








I was called by the nurse for an abnormal ABG showing metabolic acidosis with a 

PO2 55. Patients O2 is 95% RA. He was seen and rounded on by Dr. Gomes today 

who thought he appeared to be more lethargic and ordered an ABG. I am currently 

covering this patient at this time. The patient was assessed at the bedside and 

per nurse he is more lethargic today. He answers some of my questions but 

appears lethargic. Plan is to transfer pt to ICCU for Bipap, repeat ABG in 1 

hour. I spoke to his HCP (ex-wife) and stated his wishes are for intubation if 

he fails Bipap.
PROGRESS NOTE:

 

DATE OF SERVICE/DICTATION:  01/29/18

 

HISTORY:  Mr. Blake is a 62-year-old gentleman who I know from previous 
management of chronic renal failure last fall.  He had actually had recovery of 
renal function, which has actually occurred a couple of times in the past on 
him and we were able to discontinue dialysis.  He presented on this occasion 
because of changes in mental status.  He apparently was confused, somewhat 
disoriented, very somnolent. He actually was unable to give me any history at 
all right now.  So, the history is actually taken from the record.

 

PAST MEDICAL HISTORY:  His previous medical history is significant for 2 other 
episodes of acute on chronic renal failure requiring dialysis prior to the last 
occasion.  He has a history of morbid obesity.  He has had a long history of 
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 
steatohepatitis although I am not aware ever had a renal biopsy.  He has a 
history of congestive heart failure and history of cellulitis in the past.

 

MEDICATIONS:  His medications included:

1.  Oxycodone with acetaminophen.

2.  Lactulose 30 cc b.i.d.

3.  Omeprazole 20 mg daily.

4.  Lactobacillus acidophilus 1 daily.

5.  Hydrocortisone cream b.i.d.

6.  Bicitra 15 cc twice a day.

7.  Rifaximin 550 mg p.o. b.i.d.

8.  Guaifenesin ER 1200 mg b.i.d.

9.  Midodrine 10 mg t.i.d.

10.  Atorvastatin 10 mg daily.

 

ALLERGIES:  He is allergic to HEPARIN, CEPHALEXIN, PENICILLIN, RIVAROXABAN, 
SULFA ANTIBIOTICS and a WARFARIN.

 

At the present time, he mumbles and responds to questions, but really does not 
make sense to me at all.

 

PHYSICAL EXAMINATION:  His blood pressure is 134/94 with a pulse of 102, 
respirations are 14.  His mucous membranes are moist.  He is anicteric.  His 
neck is very thick and I cannot see his neck veins.  His chest reveals very 
distant breath sounds.  Heart, I can barely hear any heart tones at all.  The 
abdomen is soft and nontender.  He does have some abdominal wall edema and he 
has got 2+ peripheral edema.

 

LABORATORY DATA:  Review of his laboratory studies reveal a white count of 9.3, 
hemoglobin of 9.2, hematocrit of 29, platelet count 229,000.  INR 1.31.  Sodium 
140, potassium 4.1, total CO2 25, chloride 106, BUN 40, creatinine 3.39.  We 
were able to discontinue dialysis when his creatinine was down at 3.42.  He 
presented with a creatinine of 2.89.  Glucose is 67, calcium of 11.7, ionized 
calcium of 5.99, ammonia 78, albumin 2.6, PTH of 4.4, TSH 5.62.

 

IMPRESSION:

1.  Acute on chronic renal insufficiency.

2.  Hypercalcemia, the etiology of which is not yet determined.  Other than 
immobility, there is no obvious culprits at the present time.  One would think 
that his PTH should be suppressed at this level of calcium and yet it is mid 
ranged normal.  There is the possibility of vitamin D intoxication.  We have a 
25- hydroxyvitamin D but not a 125-dihydroxy vitamin D, that should be 
obtained. Vitamin A level should be obtained as well.  Probably would be a 
reasonable thing to see some protein immunoelectrophoresis on his blood and his 
urine with free light chains change and immunoglobulins until we see a PTH-
related peptide back.  I do not think there is much value in scanning 
everything looking for malignancy. The more esoteric causes such as familial 
hypocalciuric hypercalcemia is not likely since this is a new event in him and 
that should have presented a long time ago. Again, clearly sarcoid and 
granulomatous diseases would likely have presented sometime ago as well.

 

I have discussed the case with Diana Arango and Dr. Valles.

 

 [id number]/[id number] #: [id number]

MARIBEL
PROGRESS NOTE:

 

DATE OF SERVICE:

 

HISTORY:  Mr. Blake remains obtunded.  He just required being intubated for respiratory failure.  He ha
s been hypotensive.  He remains significantly edematous. His chest continues to have very distant juaquin
ath sounds and heart tones.  He has positive bowel sounds.  His laboratory values reveal that his humberto
cium has come down nicely with the administration of Miacalcin, now 10.3.  Unfortunately, his creatin
ine continues to go up, now at 5.56.  His ammonia is 94.

 

IMPRESSION:  Recurrent acute renal failure.  He has had some urine output in the last day.  Dr. Eugenia rocha and I have agreed that we probably should proceed on to hemodialyze him and try to ultrafilter off
 some extra fluid; however, he now has multiple organ systems that are impaired and as a result it is
 likely that he will not survive this.

 

 [id number]/[facility] #: [id number]
PROGRESS NOTE:

 

DATE OF SERVICE:

 

HISTORY:  Mr. Blake remains very confused.  He barely arouses to stimulation.  He has had no urine outp
ut at all for the past 3 days.  Dr. Burns was in earlier today and attempted a catheter, which was un
successful.  He will be back in a little while to make another attempt.  A bladder scan is being done
 right now.  He had some respiratory failure last night, he had pulled off his BiPAP.

 

PHYSICAL EXAMINATION:  He remains afebrile.  His blood pressure is 113/82, heart rate of 105, O2 satu
ration 97 on 2 L of oxygen.  He has very distant lung sounds, but I did not hear much in the way of r
ales.  His heart tones are still very, very difficult to hear.  His abdomen is relatively soft, bowel
 sounds are positive.  He has been having diarrhea.  He still has 1+ edema, but he is still remarkabl
y less edematous than when we were dialyzing him last fall and his weight remains down about 50 kg fr
om that time.

 

LABORATORY DATA:  Hemoglobin is 11.9, white cells 8.8, platelets 154,000.  His electrolytes are withi
n normal limits.  BUN 46, creatinine 4.6, glucose 63. Calcium is down a little bit, 10.9.

 

IMPRESSION AND PLAN:  I suggested yesterday that we start him on some Miacalcin nasal spray since all
 of the rest of the tests for the hypercalcemia have come back.  While his TSH is slightly elevated, 
I do not think that is certainly the likely cause of his hypercalcemia.  His PTH is normal.  His indira
min A level was actually low.  His parathyroid-related peptide is low range normal.  As a result, I t
hink we can blame his hypercalcemia on the fact that he is totally immobile.  At the present time, I 
do not believe the fluid overload is a major contributor to his respiratory status.  Hopefully, a cat
heter can be placed and I hope that he has significant bladder outlet obstruction to account for his 
worsening renal function.

 

 [id number]/CPS #: [id number]
Progress Note





- Progress Note


Date of Service: 02/05/18


Note: 





CRITICAL CARE MEDICINE


Date: 2/5/18  Time: 1035





SUBJECTIVE: Patient seen and examined. Taking over for hospitalist service. 





PHYSICAL EXAM:


Vital Signs: Reviewed.


Neurologic: obtunded to stupor. moves ext to pain but no verbal communication 

and cannot arouse to open eyes. 


HEENT: pupils equal. Sclera anicteric. Trachea midline. MM dry with crusted 

blood. poor dentition. 


Cardiovascular: distant, S1 S2


Respiratory: dec bs on left; off bipap cannot maintain with forced exhalation 

and poor excursion


Abdomen: Soft, morbidly obese, with anasarca. old scarring


Extremities: cool. edematous with chronic changes


Access: piv





LABS: Reviewed.


IMAGING: Reviewed.


MEDICATIONS: Reviewed.





ASSESSMENT: 62 M


MSOF


Acute hypoxic and hypercapnic respiratory failure


Hepatic encephalopathy stage IV


Acute on chronic renal failure - now anuric. 


GARCIA


Hypercalcemia


Morbid obesity 


Sacral decubitus unstagable at present


Relative adrenal insufficiency 


Malnutrition mod to severe degree





PLAN:


Neurologic: airway control. intubation. continued lactulose and rifaximin. 

doubt ct would help but avoid cerebral edema. 


Cardiovascular: poor overall perfusion given his surface area needs. 

Interstitial volume grossly overloaded, and intravasc may remain poor. cellular 

water deficiency, al beit more salt overloaded, but water losses from renal 

previous perhaps and certainly with lactulose use. colloid load for what its 

worth. will need vasopressors. empiric steroids. 


Respiratory: intubation. mech vent. get to aprv to open atelectasis. 


Gastrointestinal: ogt. tf. rx. sort liver dynamics as able. 


Renal/Metabolic: place hd cath and start ihd this week to see if we can improve 

water and toxin clearance. Nephro following. calcitonin rx. 


Infectious Disease: no real infective source but at risk. 


Hematology: plt have dropped (and have done this in past). coags up. follow. 


Endocrine: empiric stress dose steroids. 


Musculoskeletal: wound eval. poor ability to help self. 


Psych/Social: will try to find family. 





Supportive and preventative care as ordered.


SUP: ppi


VTE prophylaxis: holding chemical at present


Disposition: ICU; prognosis quite guarded


Code Status: Full presently


Critical Care Time: 35min, excluding procedures. 





ANTONI Viveros, 
Progress Note





- Progress Note


Date of Service: 02/05/18


Note: 





CRITICAL CARE MEDICINE


PROCEDURE NOTE 


DATE:  2/5/18   TIME:  1130


SERVICE: Critical Care Medicine


LOCATION OF PROCEDURE: ICU


PROCEDURE: Central line/hemodialysis line insertion.


PROCEDURALIST: Dr. Viveros


Consent obtain: No, procedure performed emergently due to refractory 

hypotension 


Time out held: Yes 





INDICATION: MSOF; Acute on chronic renal failure





PROCEDURE:  


Oxygenation maintained and vitals monitored. 


Patient in supine position. 





SITE: RIGHT Internal jugular 


Site preparation with chlorhexidine locally. 


Full sterile drape, gown, hat, mask, gloves. 


5ml 1% Lidocaine utilized at incision site.


Standard sterile Seldinger technique utilized via ultrasound guidance and 

Mahurkar catheter was inserted to 18cm and sutured in place.


Good blood return /wave form. High venous pressures. 


Minimal blood loss. 


Site dressed with tegaderm. 





Portable chest x-ray pending.


Patient otherwise tolerated well. 





ANTONI Viveros, 
Progress Note





- Progress Note


Date of Service: 02/05/18


Note: 





CRITICAL CARE MEDICINE


PROCEDURE NOTE 


DATE:  2/5/18   TIME: 1115


SERVICE: Critical Care Medicine


LOCATION OF PROCEDURE: ICU


PROCEDURE: Endotracheal intubation


PROCEDURALIST: Dr. Viveros


Consent obtain: No, procedure performed emergently


Time out held: Not indicated





INDICATION: Acute respiratory failure sec to msof and obtundation towards 

stupor.





PROCEDURE:  


Oxygenation maintained and vitals monitored. 


Patient in supine position. 


Pre-medication with fentanyl 200mcg IV.





Glidescope #4 inserted with Grade 1 view obtained. 


8.5 endotracheal tube inserted to 23cm lip.  


Good chest rise with breath sounds appreciated in bilaterally lung fields.


EtCO2 + color change.  


Portable chest x-ray pending.


Patient otherwise tolerated well. 





ANTONI Viveros DO
Progress Note





- Progress Note


Date of Service: 02/07/18


Note: 





Numerous issues overnight including hypotension, scant bleeding noted from 

mouth and OG tube, worsened serum CO2,  ammonia level and low blood glucose. I 

discussed with the patient's son and ex-wife who were in the room about the 

patient's grim prognosis. While in the room, the patient suddenly put out ~

400ml of dark red blood from his OG tube. H/H at 0110 was in range with where 

he has been. Follow up CBC later this AM. Attempting IVF bolus and bicarb drip 

to improve pressure and acid/base status. Blood glucose also markedly low at 

18. He has received 2 amps D50 with mild improvement in blood sugar. Continue 

to follow glucoses closely. I am very concerned the patient will decompensate 

even further, perhaps even develop cardiac arrest. It is unlikely he will 

survive despite all efforts.
Subjective


Date of Service: 01/26/18


Interval History: 





Mr. Blake is more responsive than yesterday.  He was able to finish his breakfast 

and does answer yes/no and follow commands.  However, he remains lethargic and 

not at baseline.  He also appears slightly more short of breath than on 

admission.  He denies any complaint to me but his nurse notes that he did 

complain of this to her.








Objective


Active Medications: 





Atorvastatin Calcium (Lipitor*)  10 mg PO 1900 [facility]


Citric Acid/Sodium Citrate (Bicitra*)  15 ml PO 0800,2000 [facility]


Guaifenesin (Mucinex*)  1,200 mg PO 0800,2000 [facility]


Hydrocortisone (Hytone Cream 1%*)  1 applic TOPICAL BID [facility]


Midodrine (Midodrine (Nf))  10 mg PO TID [facility]


Omeprazole (Prilosec Cap*)  20 mg PO 0700 [facility]


Rifaximin (Xifaxan*)  550 mg PO 0800,2000 [facility]


Throat Lozenges (Chloraseptic Kimberly*)  1 kimberly PO Q2H PRN





Vital Signs:











Temp Pulse Resp BP Pulse Ox


 


 97.2 F   102   16   130/70   91 


 


 01/26/18 07:34  01/26/18 07:34  01/26/18 07:34  01/26/18 07:34  01/26/18 07:34











Oxygen Devices in Use Now: None


Appearance: Obese male lying in bed in NAD


Eyes: No Scleral Icterus


Ears/Nose/Mouth/Throat: Mucous Membranes Moist


Neck: Trachea Midline


Respiratory: Symmetrical Chest Expansion and Respiratory Effort, - - Wheezing 

noted at bedside, difficult auscultation due to body habitus


Cardiovascular: - - +2 lower extremity edema


Abdominal: NL Sounds; No Tenderness; No Distention


Skin: - - bilateral LEs red and dry, appears chronic with no active infection, 

minimal excoriation to buttocks


Neurological: - - awakens to voice, will say one or two words, lethargic, 

follows commands weakly


Nutrition: Taking PO's


Result Diagrams: 


 01/26/18 05:49





 01/26/18 05:49


Additional Lab and Data: 


.





Assess/Plan/Problems-Billing


Assessment: 





Mr. Blake is a 63 yo M with a PMH of recent acute on chronic renal failure now 

off hemodialysis, episodes of hyperammonemia who was admitted on 1/25/18 with 

altered mental status.








- Patient Problems


(1) Altered mental status


Comment: 


- At this point, suspect this is related to elevated ammonia.


- Plan to continue lactulose and rifaximin.


- No evidence of infection or other etiology for encephalopathy.   





(2) Hepatic encephalopathy


Comment: 


- Ammonia level 90 on arrival.


- Increase dose of lactulose, continue rifaximin.    





(3) CHF (congestive heart failure)


Comment: 


- EF 40-45% by most recent echo.


- Has lower extremity edema and vascular congestion on xray, both of which are 

chronic.  However he does appear somewhat SOB today therefore does have 

component of acute on chronic CHF.


- Will start low dose lasix.


- Monitor I/Os and daily weights.   





(4) CKD (chronic kidney disease) stage 4, GFR 15-29 ml/min


Comment: 


- Creatinine stable at current baseline.


- Continue bicitra.


- Will give low dose lasix for edema and vascular congestion, monitor Cr 

closely.   





(5) HTN (hypertension)


Comment: 


- -150.


- Continue Midodrine TID.   





(6) HLD (hyperlipidemia)


Comment: 


- Continue atorvastatin.   





(7) DVT prophylaxis


Comment: 


- SCDs and fondaparinux, heparin allergy.   


Status and Disposition: 





Inpatient with expected LOS > 2 days.  Anticipate discharge back to [facility] 

when medically stable.
Subjective


Date of Service: 01/27/18


Interval History: 





Patient has no new complaints. Breathing a bit better today. Tolerating food.


He knows he's in [place]. He thinks it's the 1st of the month.


Receiving lactulose. Denies diarrhea.


Family History: Unchanged from Admission


Social History: Unchanged from Admission


Past Medical History: Unchanged from Admission





Objective


Active Medications: 








Atorvastatin Calcium (Lipitor*)  10 mg PO 1900 [facility]


   Last Admin: 01/26/18 17:20 Dose:  10 mg


Citric Acid/Sodium Citrate (Bicitra*)  15 ml PO 0800,2000 [facility]


   Last Admin: 01/27/18 08:53 Dose:  15 ml


Fondaparinux (Arixtra*)  2.5 mg SUBCUT DAILY [facility]


   Last Admin: 01/27/18 08:53 Dose:  2.5 mg


Furosemide (Lasix Tab*)  20 mg PO DAILY [facility]


   Last Admin: 01/27/18 08:53 Dose:  20 mg


Guaifenesin (Mucinex*)  1,200 mg PO 0800,2000 [facility]


   Last Admin: 01/27/18 08:53 Dose:  1,200 mg


Hydrocortisone (Hytone Cream 1%*)  1 applic TOPICAL BID [facility]


   Last Admin: 01/27/18 08:53 Dose:  1 applic


Lactulose (Lactulose*)  30 ml PO TID [facility]


   Last Admin: 01/27/18 13:00 Dose:  30 ml


Midodrine (Midodrine (Nf))  10 mg PO TID [facility]


   PRN Reason: Protocol


   Last Admin: 01/27/18 13:00 Dose:  10 mg


Omeprazole (Prilosec Cap*)  20 mg PO 0700 [facility]


   Last Admin: 01/27/18 08:52 Dose:  20 mg


Rifaximin (Xifaxan*)  550 mg PO 0800,2000 [facility]


   Last Admin: 01/27/18 08:53 Dose:  550 mg


Throat Lozenges (Chloraseptic Kimberly*)  1 kimberly PO Q2H PRN


   PRN Reason: COUGH








 Vital Signs - 8 hr











  01/27/18 01/27/18 01/27/18





  07:45 08:00 11:52


 


Temperature 36.3 C  36.2 C


 


Pulse Rate 100  101


 


Respiratory 16 28 22





Rate   


 


Blood Pressure 112/57  126/67





(mmHg)   


 


O2 Sat by Pulse 94 94 93





Oximetry   











Oxygen Devices in Use Now: None


Appearance: obese, no distress


Eyes: No Scleral Icterus


Ears/Nose/Mouth/Throat: Clear Oropharnyx


Neck: NL Appearance and Movements; NL JVP


Respiratory: Symmetrical Chest Expansion and Respiratory Effort, Clear to 

Auscultation


Cardiovascular: NL Sounds; No Murmurs; No JVD, RRR


Abdominal: - - obese, massively distended


Lymphatic: No Cervical Adenopathy


Extremities: -


Neurological: - - oriented


Lines/Tubes/Other Access: Clean, Dry and Intact Peripheral IV


Nutrition: Taking PO's


Result Diagrams: 


 01/26/18 05:49





 01/26/18 05:49


Additional Lab and Data: 


 Laboratory Tests











  01/25/18 01/25/18 01/25/18





  15:21 15:21 20:25


 


Ammonia    90 H


 


B-Natriuretic Peptide   484 H 


 


Albumin  2.8 L  














Assess/Plan/Problems-Billing


Assessment: 


Mr. Blake is a 61 yo M with a PMH of recent acute on chronic renal failure now 

off hemodialysis, episodes of hyperammonemia who was admitted on 1/25/18 with 

altered mental status.








- Patient Problems


(1) Altered mental status


Current Visit: Yes   Status: Acute   Priority: High   Code(s): R41.82 - ALTERED 

MENTAL STATUS, UNSPECIFIED   SNOMED Code(s): 829118770


   Comment: - improving MS as ammonia treated


- Plan to continue lactulose and rifaximin.


- hypercalcemia could be contributing to encephalopathy.   





(2) CKD (chronic kidney disease) stage 4, GFR 15-29 ml/min


Current Visit: Yes   Status: Chronic   Priority: Medium   Code(s): N18.4 - 

CHRONIC KIDNEY DISEASE, STAGE 4 (SEVERE)   SNOMED Code(s): 155121639


   Comment: - Creatinine stable after starting lasix.


- Continue bicitra.


- continue low dose lasix for edema and vascular congestion, monitor Cr 

closely.   





(3) Hypercalcemia


Current Visit: Yes   Status: Acute   Priority: Medium   Code(s): E83.52 - 

HYPERCALCEMIA   SNOMED Code(s): 73511805


   Comment: - corrected Ca 12.9, moderate-severe


- likely related to CKD


- checking VitD, PTH, calcium ionized   





(4) DVT prophylaxis


Current Visit: No   Status: Acute   Priority: Low   Code(s): KKK8848 -    

SNOMED Code(s): 998522525


   Comment: - SCDs and fondaparinux, heparin allergy.   


Status and Disposition: 


Inpatient with expected LOS > 2 days.  Anticipate discharge back to [facility] 

when medically stable.
Subjective


Date of Service: 01/28/18


Interval History: 





Patient more confused today. He does not know where he is. 


He cannot say whether he is voiding adequately. He is eating OK.


Family History: Unchanged from Admission


Social History: Unchanged from Admission


Past Medical History: Unchanged from Admission





Objective


Active Medications: 








Atorvastatin Calcium (Lipitor*)  10 mg PO 1900 [facility]


   Last Admin: 01/27/18 18:28 Dose:  10 mg


Citric Acid/Sodium Citrate (Bicitra*)  15 ml PO 0800,2000 [facility]


   Last Admin: 01/28/18 07:41 Dose:  15 ml


Fondaparinux (Arixtra*)  2.5 mg SUBCUT DAILY [facility]


   Last Admin: 01/28/18 07:41 Dose:  2.5 mg


Furosemide (Lasix Tab*)  20 mg PO DAILY [facility]


   Last Admin: 01/28/18 07:42 Dose:  20 mg


Guaifenesin (Mucinex*)  1,200 mg PO 0800,2000 [facility]


   Last Admin: 01/28/18 07:42 Dose:  1,200 mg


Hydrocortisone (Hytone Cream 1%*)  1 applic TOPICAL BID [facility]


   Last Admin: 01/28/18 07:42 Dose:  1 applic


Lactulose (Lactulose*)  30 ml PO TID [facility]


   Last Admin: 01/28/18 07:41 Dose:  30 ml


Midodrine (Midodrine (Nf))  10 mg PO TID [facility]


   PRN Reason: Protocol


   Last Admin: 01/28/18 07:42 Dose:  10 mg


Omeprazole (Prilosec Cap*)  20 mg PO 0700 [facility]


   Last Admin: 01/28/18 07:42 Dose:  20 mg


Rifaximin (Xifaxan*)  550 mg PO 0800,2000 [facility]


   Last Admin: 01/28/18 07:42 Dose:  550 mg


Throat Lozenges (Chloraseptic Kimberly*)  1 kimberly PO Q2H PRN


   PRN Reason: COUGH








 Vital Signs - 8 hr











  01/28/18 01/28/18 01/28/18





  03:28 07:52 08:00


 


Pulse Rate 100 100 


 


Respiratory 17 20 20





Rate   


 


Blood Pressure 138/78 121/65 





(mmHg)   


 


O2 Sat by Pulse 81 90 90





Oximetry   











Oxygen Devices in Use Now: None


Appearance: lethargic, no resp distress


Neck: NL Appearance and Movements; NL JVP


Respiratory: Clear to Auscultation


Cardiovascular: NL Sounds; No Murmurs; No JVD, RRR


Abdominal: NL Sounds; No Tenderness; No Distention, - - obese, soft, NT


Extremities: - - 1+ edema bilat LE


Skin: - - chronic venous stasis bilat


Neurological: Alert and Oriented x 3


Lines/Tubes/Other Access: Clean, Dry and Intact Peripheral IV


Nutrition: Taking PO's


Result Diagrams: 


 01/28/18 08:20





 01/28/18 06:55


Additional Lab and Data: 


 Laboratory Tests











  01/28/18 01/28/18 01/28/18





  06:55 06:55 06:55


 


Hct   


 


Calcium  11.5 H  


 


Ionized Calcium    5.99 H


 


Alkaline Phosphatase  186 H  


 


Ammonia   78 H 


 


Albumin  2.6 L  


 


25-OH Vitamin D Total   


 


PTH Intact   


 


Calcium (PTH Intact)   














  01/28/18 01/28/18 01/28/18





  06:55 06:55 08:20


 


Hct    29 L


 


Calcium   


 


Ionized Calcium   


 


Alkaline Phosphatase   


 


Ammonia   


 


Albumin   


 


25-OH Vitamin D Total   22.0 


 


PTH Intact  4.4  


 


Calcium (PTH Intact)  11.3 H  

















Assess/Plan/Problems-Billing


Assessment: 


Mr. Blake is a 61 yo M with a PMH of recent acute on chronic renal failure now 

off hemodialysis, episodes of hyperammonemia who was admitted on 1/25/18 with 

altered mental status.








- Patient Problems


(1) Altered mental status


Current Visit: Yes   Status: Acute   Priority: High   Code(s): R41.82 - ALTERED 

MENTAL STATUS, UNSPECIFIED   SNOMED Code(s): 729949370


   Comment: - mental status worse today, though NH3 better


- Plan to continue lactulose and rifaximin.


- hypercalcemia could be contributing to encephalopathy.   





(2) CKD (chronic kidney disease) stage 4, GFR 15-29 ml/min


Current Visit: Yes   Status: Chronic   Priority: Medium   Code(s): N18.4 - 

CHRONIC KIDNEY DISEASE, STAGE 4 (SEVERE)   SNOMED Code(s): 096774193


   Comment: - Creatinine now worse. Will stop lasix


- Continue bicitra.


- may have post-renal obstruction. Ultrasound ordered of kidneys/bladder


- discussed case with Dr. Hernandez and Dr. Lezama


- anemia worsened, will check erythopoetin and iron stores.   





(3) Hypercalcemia


Current Visit: Yes   Status: Acute   Priority: Medium   Code(s): E83.52 - 

HYPERCALCEMIA   SNOMED Code(s): 29115166


   Comment: - differential includes primary or tertiary hyperparathyroid


- could treat with saline diuresis if bladder known to be voiding well


- will assess thyroid, adrenals as cause.   





(4) DVT prophylaxis


Current Visit: No   Status: Acute   Priority: Low   Code(s): OXX7937 -    

SNOMED Code(s): 579042269


   Comment: - SCDs and fondaparinux, heparin allergy.   





(5) Cirrhosis of liver with ascites


Current Visit: Yes   Status: Acute   Priority: Medium   Code(s): K74.60 - 

UNSPECIFIED CIRRHOSIS OF LIVER   SNOMED Code(s): 64874942


   Comment: -cirrhosis thought to be due to fatty liver


-continue Xifaxan and lactulose for elevated ammonia.   


Status and Disposition: 


Inpatient with expected LOS > 2 days.  Anticipate discharge back to [facility] 

when medically stable.
Subjective


Date of Service: 01/29/18


Interval History: 





Patient seen and examined at bedside. Denies fever, chills, shortness of breath

, chest discomfort, N/V/D. Pt continues to have confusion. 





Tele: Sinus rhythm, rate 's.








Family History: Unchanged from Admission


Social History: Unchanged from Admission


Past Medical History: Unchanged from Admission





Objective


Active Medications: 





Atorvastatin Calcium (Lipitor*)  10 mg PO 1900 [facility]


Citric Acid/Sodium Citrate (Bicitra*)  15 ml PO 0800,2000 [facility]


Fondaparinux (Arixtra*)  2.5 mg SUBCUT DAILY [facility]


Guaifenesin (Mucinex*)  1,200 mg PO 0800,2000 [facility]


Hydrocortisone (Hytone Cream 1%*)  1 applic TOPICAL BID [facility]LE[facility]ANDRA


Lactulose (Lactulose*)  30 ml PO TID [facility]LE[facility]ANDRA


Midodrine (Midodrine (Nf))  10 mg PO TID [facility]LEJANDRA


Omeprazole (Prilosec Cap*)  20 mg PO 0700 [facility]


Rifaximin (Xifaxan*)  550 mg PO 0800,2000 [facility]


Throat Lozenges (Chloraseptic Kimberly*)  1 kimberly PO Q2H PRN Reason: COUGH





 Vital Signs - 8 hr











  01/29/18 01/29/18





  04:10 05:00


 


Pulse Rate 102 


 


Blood Pressure 134/94 





(mmHg)  


 


O2 Sat by Pulse 84 91





Oximetry  











Oxygen Devices in Use Now: None


Appearance: NAD, laying in bed


Ears/Nose/Mouth/Throat: Mucous Membranes Moist


Respiratory: Symmetrical Chest Expansion and Respiratory Effort, Clear to 

Auscultation - , diminished


Cardiovascular: NL Sounds; No Murmurs; No JVD, RRR


Abdominal: - - Large, soft, non tender. Pt with 2+ pitting edema to left side (

he is laying on this side)


Extremities: - - 2+ bilateral LE edema


Neurological: - - Alert and Oriented to Person and Place


Lines/Tubes/Other Access: Clean, Dry and Intact Peripheral IV - site benign


Nutrition: Taking PO's


Result Diagrams: 


 01/28/18 08:20





 01/29/18 05:29


Additional Lab and Data: 


 Laboratory Tests











  01/28/18 01/28/18 01/28/18





  06:55 06:55 06:55


 


Hct   


 


Calcium  11.5 H  


 


Ionized Calcium    5.99 H


 


Alkaline Phosphatase  186 H  


 


Ammonia   78 H 


 


Albumin  2.6 L  


 


25-OH Vitamin D Total   


 


PTH Intact   


 


Calcium (PTH Intact)   














  01/28/18 01/28/18 01/28/18





  06:55 06:55 08:20


 


Hct    29 L


 


Calcium   


 


Ionized Calcium   


 


Alkaline Phosphatase   


 


Ammonia   


 


Albumin   


 


25-OH Vitamin D Total   22.0 


 


PTH Intact  4.4  


 


Calcium (PTH Intact)  11.3 H  

















Assess/Plan/Problems-Billing


Assessment: 





Mr. Blake is a 63 yo M with a PMH of recent acute on chronic renal failure now 

off hemodialysis, episodes of hyperammonemia who was admitted on 1/25/18 with 

altered mental status.








- Patient Problems


(1) Altered mental status


Code(s): R41.82 - ALTERED MENTAL STATUS, UNSPECIFIED   SNOMED Code(s): 899971533


   Comment: 


- Continues to have altered mental status


- Suspect secondary to hyperammonemia


- Hypercalcemia could also be contributing to encephalopathy


- Plan to continue lactulose (increase to QID) and rifaximin


   





(2) CKD (chronic kidney disease) stage 4, GFR 15-29 ml/min


Code(s): N18.4 - CHRONIC KIDNEY DISEASE, STAGE 4 (SEVERE)   SNOMED Code(s): 

402459856


   Comment: 


- Acute on chronic


- Creatinine continues to rise


- Renal/bladder ultrasound - no significant findings


- Discussed case with Dr. Hernandez and Dr. Lezama


- Anemia worsened, will check erythopoetin and iron stores


- Continue bicitra   





(3) Hypercalcemia


Code(s): E83.52 - HYPERCALCEMIA   SNOMED Code(s): 45154334


   Comment: 


- Differential includes primary or tertiary hyperparathyroid, ? increased due 

to inactivity


- Plan to treat with saline diuresis


- Will assess for thyroid and adrenal causes   





(4) Hepatic encephalopathy


Code(s): K72.90 - HEPATIC FAILURE, UNSPECIFIED WITHOUT COMA   SNOMED Code(s): 

40405667


   Comment: 


- Ammonia level 90 at admission


- Continue lactulose (increased) and rifaximin    





(5) Cirrhosis of liver with ascites


Code(s): K74.60 - UNSPECIFIED CIRRHOSIS OF LIVER   SNOMED Code(s): 49198289


   Comment: 


- Cirrhosis thought to be due to fatty liver vs GARCIA


- Continue Xifaxan and lactulose (increased) for elevated ammonia   





(6) Morbid obesity


Code(s): E66.01 - MORBID (SEVERE) OBESITY DUE TO EXCESS CALORIES   SNOMED Code(s

): 134813935


   Comment: 


- BMI ~ 42   





(7) Afib


Code(s): I48.91 - UNSPECIFIED ATRIAL FIBRILLATION   SNOMED Code(s): 27504530


   Comment: 


- Rate controlled, not currently anticoagulated at home


- Patient has documented allergy to warfarin


- Continue Arixtra


   





(8) Diabetes mellitus


Code(s): E11.9 - TYPE 2 DIABETES MELLITUS WITHOUT COMPLICATIONS   SNOMED Code(s)

: 54523147


   Comment: 


- BGs 60-70s.


- Diet controlled.   





(9) HLD (hyperlipidemia)


Code(s): E78.5 - HYPERLIPIDEMIA, UNSPECIFIED   SNOMED Code(s): 83321166


   Comment: 


- Continue atorvastatin.   





(10) HTN (hypertension)


Code(s): I10 - ESSENTIAL (PRIMARY) HYPERTENSION   SNOMED Code(s): 92330205


   Comment: 


- -130.


- Continue Midodrine TID.   





(11) DVT prophylaxis


Code(s): PEP4118 -    SNOMED Code(s): 612488122


   Comment: 


- SCDs and fondaparinux, heparin allergy   





(12) Full code status


Code(s): Z78.9 - OTHER SPECIFIED HEALTH STATUS   SNOMED Code(s): 325110915


   


Status and Disposition: 





Inpatient with expected LOS > 2 days.  Anticipate discharge back to [facility] 

when medically stable.
Subjective


Date of Service: 01/30/18


Interval History: 





Patient seen and examined at bedside. Denies fever, chills, shortness of breath

, chest discomfort, N/V/D. Pt denies pain. Pt continues to have confusion.





Per NM they feel Pt will not tolerate parathyroid scan, as he will need to lay 

still for a few hours. They are also unsure if he would fit on the NM table due 

to his size.





Family History: Unchanged from Admission


Social History: Unchanged from Admission


Past Medical History: Unchanged from Admission





Objective


Active Medications: 





Atorvastatin Calcium (Lipitor*)  10 mg PO 1900 [facility]


Citric Acid/Sodium Citrate (Bicitra*)  15 ml PO 0800,2000 [facility]


Fondaparinux (Arixtra*)  2.5 mg SUBCUT DAILY [facility]


Guaifenesin (Mucinex*)  1,200 mg PO 0800,2000 [facility]


Hydrocortisone (Hytone Cream 1%*)  1 applic TOPICAL BID [facility]


Sodium Chloride (Ns 0.9% 1000 Ml*)  1,000 mls @ 0 mls/hr IV PER RATE [facility]


Lactulose (Lactulose*)  30 ml PO QID [facility]


Midodrine (Midodrine (Nf))  10 mg PO TID [facility]


Omeprazole (Prilosec Cap*)  20 mg PO 0700 [facility]


Rifaximin (Xifaxan*)  550 mg PO 0800,2000 [facility]


Throat Lozenges (Chloraseptic Kimberly*)  1 kimberly PO Q2H PRN Reason: COUGH





 Vital Signs - 8 hr











  01/30/18 01/30/18 01/30/18





  03:35 07:54 08:00


 


Temperature 97.5 F 96.1 F 


 


Pulse Rate 97 99 


 


Respiratory 17 22 19





Rate   


 


Blood Pressure 115/71 126/68 





(mmHg)   


 


O2 Sat by Pulse 93 100 100





Oximetry   











Oxygen Devices in Use Now: Nasal Cannula - 2L


Appearance: NAD, laying in bed


Respiratory: Symmetrical Chest Expansion and Respiratory Effort, Clear to 

Auscultation


Cardiovascular: NL Sounds; No Murmurs; No JVD, RRR


Abdominal: - - Large, soft, non tender


Extremities: - - 2-3+ pitting edema


Skin: - - 2-3+ pitting edema to left side of ABD


Neurological: - - Alert and Oriented to Person and Place, confused


Lines/Tubes/Other Access: Clean, Dry and Intact Peripheral IV - site benign


Nutrition: Taking PO's


Result Diagrams: 


 01/30/18 06:12





 01/30/18 06:12


Additional Lab and Data: 


 Laboratory Tests











  01/28/18 01/28/18 01/28/18





  06:55 06:55 06:55


 


Hct   


 


Calcium  11.5 H  


 


Ionized Calcium    5.99 H


 


Alkaline Phosphatase  186 H  


 


Ammonia   78 H 


 


Albumin  2.6 L  


 


25-OH Vitamin D Total   


 


PTH Intact   


 


Calcium (PTH Intact)   














  01/28/18 01/28/18 01/28/18





  06:55 06:55 08:20


 


Hct    29 L


 


Calcium   


 


Ionized Calcium   


 


Alkaline Phosphatase   


 


Ammonia   


 


Albumin   


 


25-OH Vitamin D Total   22.0 


 


PTH Intact  4.4  


 


Calcium (PTH Intact)  11.3 H  

















Assess/Plan/Problems-Billing


Assessment: 





Mr. Blake is a 63 yo M with a PMH of recent acute on chronic renal failure now 

off hemodialysis, episodes of hyperammonemia who was admitted on 1/25/18 with 

altered mental status.








- Patient Problems


(1) Altered mental status


Code(s): R41.82 - ALTERED MENTAL STATUS, UNSPECIFIED   SNOMED Code(s): 141138343


   Comment: 


- Continues to have altered mental status


- Suspect secondary to hyperammonemia


- Hypercalcemia could also be contributing to encephalopathy


- Plan to continue lactulose (increased to QID) and rifaximin


   





(2) CKD (chronic kidney disease) stage 4, GFR 15-29 ml/min


Code(s): N18.4 - CHRONIC KIDNEY DISEASE, STAGE 4 (SEVERE)   SNOMED Code(s): 

006801115


   Comment: 


- Acute on chronic


- Creatinine continues to rise


- Renal/bladder ultrasound - no significant findings


- Discussed case with Dr. Hernandez and Dr. Lezama


- Continue bicitra   





(3) Hypercalcemia


Code(s): E83.52 - HYPERCALCEMIA   SNOMED Code(s): 88524251


   Comment: 


- Differential includes primary or tertiary hyperparathyroid, ? increased due 

to inactivity


- Plan to treat with saline diuresis


- Will assess for thyroid (unable to perforn NM parathyroid scan at this time) 

and adrenal causes   





(4) Hepatic encephalopathy


Code(s): K72.90 - HEPATIC FAILURE, UNSPECIFIED WITHOUT COMA   SNOMED Code(s): 

07010923


   Comment: 


- Ammonia level 90 at admission, trending down


- Continue lactulose (increased) and rifaximin    





(5) Cirrhosis of liver with ascites


Code(s): K74.60 - UNSPECIFIED CIRRHOSIS OF LIVER   SNOMED Code(s): 09342853


   Comment: 


- Cirrhosis thought to be due to fatty liver vs GARCIA


- Continue Xifaxan and lactulose (increased) for elevated ammonia   





(6) Morbid obesity


Code(s): E66.01 - MORBID (SEVERE) OBESITY DUE TO EXCESS CALORIES   SNOMED Code(s

): 832091596


   Comment: 


- BMI ~ 42   





(7) Afib


Code(s): I48.91 - UNSPECIFIED ATRIAL FIBRILLATION   SNOMED Code(s): 70980886


   Comment: 


- Rate controlled, not currently anticoagulated at home


- Patient has documented allergy to warfarin


- Continue Arixtra


   





(8) Diabetes mellitus


Code(s): E11.9 - TYPE 2 DIABETES MELLITUS WITHOUT COMPLICATIONS   SNOMED Code(s)

: 85838960


   Comment: 


- BGs 60-70s.


- Diet controlled.   





(9) HLD (hyperlipidemia)


Code(s): E78.5 - HYPERLIPIDEMIA, UNSPECIFIED   SNOMED Code(s): 95046641


   Comment: 


- Continue atorvastatin.   





(10) HTN (hypertension)


Code(s): I10 - ESSENTIAL (PRIMARY) HYPERTENSION   SNOMED Code(s): 85761580


   Comment: 


- -140's.


- Continue Midodrine TID.   





(11) DVT prophylaxis


Code(s): AYK5328 -    SNOMED Code(s): 167826233


   Comment: 


- SCDs and fondaparinux, heparin allergy   





(12) Full code status


Code(s): Z78.9 - OTHER SPECIFIED HEALTH STATUS   SNOMED Code(s): 565822655


   


Status and Disposition: 





Inpatient with expected LOS > 2 days.  Anticipate discharge back to [facility] 

when medically stable.
Subjective


Date of Service: 01/31/18


Interval History: 





Patient seen and examined at bedside. Denies fever, chills, shortness of breath

, chest discomfort, N/V/D. Pt continues to be INC urine. Pt continues to have 

confusion. 








Family History: Unchanged from Admission


Social History: Unchanged from Admission


Past Medical History: Unchanged from Admission





Objective


Active Medications: 





Atorvastatin Calcium (Lipitor*)  10 mg PO 1900 [facility]


Citric Acid/Sodium Citrate (Bicitra*)  15 ml PO 0800,2000 [facility]


Fondaparinux (Arixtra*)  2.5 mg SUBCUT DAILY [facility]


Guaifenesin (Mucinex*)  1,200 mg PO 0800,2000 [facility]


Hydrocortisone (Hytone Cream 1%*)  1 applic TOPICAL BID [facility]LEJANDRA


Lactulose (Lactulose*)  30 ml PO QID [facility]


Midodrine (Midodrine (Nf))  10 mg PO TID [facility]LE[facility]DRA


Omeprazole (Prilosec Cap*)  20 mg PO 0700 [facility]


Rifaximin (Xifaxan*)  550 mg PO 0800,2000 [facility]


Throat Lozenges (Chloraseptic Kimberly*)  1 kimberly PO Q2H PRN Reason: COUGH





 Vital Signs - 8 hr











  01/31/18





  07:52


 


Temperature 97.5 F


 


Pulse Rate 99


 


Respiratory 24





Rate 


 


Blood Pressure 110/37





(mmHg) 


 


O2 Sat by Pulse 95





Oximetry 











Oxygen Devices in Use Now: None


Appearance: NAD, laying in bed


Respiratory: Symmetrical Chest Expansion and Respiratory Effort, Clear to 

Auscultation


Cardiovascular: NL Sounds; No Murmurs; No JVD, RRR


Abdominal: NL Sounds; No Tenderness; No Distention


Extremities: - - 2-3+


Neurological: - - Alert and Oriented to Person, Pt states he is at [employer].


Lines/Tubes/Other Access: Clean, Dry and Intact Peripheral IV - site benign


Nutrition: Taking PO's


Result Diagrams: 


 01/30/18 06:12





 01/31/18 08:34


Additional Lab and Data: 


 Laboratory Tests











  01/28/18 01/28/18 01/28/18





  06:55 06:55 06:55


 


Hct   


 


Calcium  11.5 H  


 


Ionized Calcium    5.99 H


 


Alkaline Phosphatase  186 H  


 


Ammonia   78 H 


 


Albumin  2.6 L  


 


25-OH Vitamin D Total   


 


PTH Intact   


 


Calcium (PTH Intact)   














  01/28/18 01/28/18 01/28/18





  06:55 06:55 08:20


 


Hct    29 L


 


Calcium   


 


Ionized Calcium   


 


Alkaline Phosphatase   


 


Ammonia   


 


Albumin   


 


25-OH Vitamin D Total   22.0 


 


PTH Intact  4.4  


 


Calcium (PTH Intact)  11.3 H  

















Assess/Plan/Problems-Billing


Assessment: 





Mr. Blake is a 61 yo M with a PMH of recent acute on chronic renal failure now 

off hemodialysis, episodes of hyperammonemia who was admitted on 1/25/18 with 

altered mental status.








- Patient Problems


(1) Altered mental status


Code(s): R41.82 - ALTERED MENTAL STATUS, UNSPECIFIED   SNOMED Code(s): 243220964


   Comment: 


- Continues to have altered mental status


- Suspect secondary to hyperammonemia


- Hypercalcemia could also be contributing to encephalopathy


- Plan to continue lactulose (increased to QID) and rifaximin


   





(2) CKD (chronic kidney disease) stage 4, GFR 15-29 ml/min


Code(s): N18.4 - CHRONIC KIDNEY DISEASE, STAGE 4 (SEVERE)   SNOMED Code(s): 

824920095


   Comment: 


- Acute on chronic


- Creatinine continues to rise


- Renal/bladder ultrasound - no significant findings


- Discussed case with Dr. Hernandez and Dr. Lezama


- Continue bicitra   





(3) Hypercalcemia


Code(s): E83.52 - HYPERCALCEMIA   SNOMED Code(s): 97753422


   Comment: 


- Suspect secondary to hyperparathyroidism and inactivity


- Uunable to perform NM parathyroid scan inpatient


- Dr. Hernandez following   





(4) Hepatic encephalopathy


Code(s): K72.90 - HEPATIC FAILURE, UNSPECIFIED WITHOUT COMA   SNOMED Code(s): 

79763721


   Comment: 


- Ammonia level 90 at admission, trending down


- Continue lactulose and rifaximin    





(5) Cirrhosis of liver with ascites


Code(s): K74.60 - UNSPECIFIED CIRRHOSIS OF LIVER   SNOMED Code(s): 87922188


   Comment: 


- Cirrhosis thought to be due to fatty liver vs GARCIA


- Continue Xifaxan and lactulose for elevated ammonia   





(6) Morbid obesity


Code(s): E66.01 - MORBID (SEVERE) OBESITY DUE TO EXCESS CALORIES   SNOMED Code(s

): 850160589


   Comment: 


- BMI ~ 42   





(7) Afib


Code(s): I48.91 - UNSPECIFIED ATRIAL FIBRILLATION   SNOMED Code(s): 37987648


   Comment: 


- Rate controlled, not currently anticoagulated at home


- Patient has documented allergy to warfarin


- Continue Arixtra


   





(8) Diabetes mellitus


Code(s): E11.9 - TYPE 2 DIABETES MELLITUS WITHOUT COMPLICATIONS   SNOMED Code(s)

: 00811585


   Comment: 


- BGs 60-70s.


- Diet controlled.   





(9) HLD (hyperlipidemia)


Code(s): E78.5 - HYPERLIPIDEMIA, UNSPECIFIED   SNOMED Code(s): 62036773


   Comment: 


- Continue atorvastatin.   





(10) HTN (hypertension)


Code(s): I10 - ESSENTIAL (PRIMARY) HYPERTENSION   SNOMED Code(s): 65568712


   Comment: 


- -140's.


- Continue Midodrine TID.   





(11) DVT prophylaxis


Code(s): [id number] -    SNOMED Code(s): 651903524


   Comment: 


- SCDs and fondaparinux, heparin allergy   





(12) Full code status


Code(s): Z78.9 - OTHER SPECIFIED HEALTH STATUS   SNOMED Code(s): 961307516


   


Status and Disposition: 





Inpatient with expected LOS > 2 days.  Anticipate discharge back to [facility] 

when medically stable.
Subjective


Date of Service: 02/01/18


Interval History: 





Patient seen and examined at bedside. Denies fever, chills, shortness of breath

, chest discomfort, N/V/D. Pt continues to have altered mental status and 

lethargy.








Family History: Unchanged from Admission


Social History: Unchanged from Admission


Past Medical History: Unchanged from Admission





Objective


Active Medications: 





Atorvastatin Calcium (Lipitor*)  10 mg PO 1900 [facility]


Citric Acid/Sodium Citrate (Bicitra*)  15 ml PO 0800,2000 [facility]


Fondaparinux (Arixtra*)  2.5 mg SUBCUT DAILY [facility]LE[facility]ANDR[facility]


Guaifenesin (Mucinex*)  1,200 mg PO 0800,2000 [facility]


Hydrocortisone (Hytone Cream 1%*)  1 applic TOPICAL BID ALEJANDRA


Sodium Chloride (Ns 0.9% 1000 Ml*)  1,000 mls @ 125 mls/hr IV PER RATE ALEJANDRA


Lactulose (Lactulose*)  30 ml PO QID ALEJANDRA


Midodrine (Midodrine (Nf))  10 mg PO TID ALEJANDRA


Omeprazole (Prilosec Cap*)  20 mg PO 0700 ALEJANDRA


Rifaximin (Xifaxan*)  550 mg PO 0800,2000 [facility]


Throat Lozenges (Chloraseptic Kimberly*)  1 kimberly PO Q2H PRN Reason: COUGH





 Vital Signs - 8 hr











  02/01/18 02/01/18 02/01/18





  07:49 09:25 09:35


 


Temperature 96.5 F 97.4 F 


 


Pulse Rate 92  


 


Respiratory 20  





Rate   


 


Blood Pressure 106/50  





(mmHg)   


 


O2 Sat by Pulse 94  96





Oximetry   














  02/01/18





  09:37


 


Temperature 


 


Pulse Rate 


 


Respiratory 18





Rate 


 


Blood Pressure 





(mmHg) 


 


O2 Sat by Pulse 





Oximetry 











Oxygen Devices in Use Now: None


Appearance: NAD, laying in bed


Respiratory: Symmetrical Chest Expansion and Respiratory Effort, Clear to 

Auscultation - , diminished


Cardiovascular: NL Sounds; No Murmurs; No JVD, RRR


Extremities: - - 2-3+ bilateral LE edema and to the left side of abdomen


Neurological: - - Alert and Oriented to Person and Place, confused


Lines/Tubes/Other Access: Clean, Dry and Intact Peripheral IV - site benign


Nutrition: Taking PO's


Result Diagrams: 


 01/30/18 06:12





 02/02/18 07:28


Additional Lab and Data: 


 Laboratory Tests











  01/28/18 01/28/18 01/28/18





  06:55 06:55 06:55


 


Hct   


 


Calcium  11.5 H  


 


Ionized Calcium    5.99 H


 


Alkaline Phosphatase  186 H  


 


Ammonia   78 H 


 


Albumin  2.6 L  


 


25-OH Vitamin D Total   


 


PTH Intact   


 


Calcium (PTH Intact)   














  01/28/18 01/28/18 01/28/18





  06:55 06:55 08:20


 


Hct    29 L


 


Calcium   


 


Ionized Calcium   


 


Alkaline Phosphatase   


 


Ammonia   


 


Albumin   


 


25-OH Vitamin D Total   22.0 


 


PTH Intact  4.4  


 


Calcium (PTH Intact)  11.3 H  

















Assess/Plan/Problems-Billing


Assessment: 





Mr. Blake is a 61 yo M with a PMH of recent acute on chronic renal failure now 

off hemodialysis, episodes of hyperammonemia who was admitted on 1/25/18 with 

altered mental status.








- Patient Problems


(1) Altered mental status


Code(s): R41.82 - ALTERED MENTAL STATUS, UNSPECIFIED   SNOMED Code(s): 335071416


   Comment: 


- Continues to have altered mental status


- Suspect secondary to hyperammonemia


- Hypercalcemia could also be contributing to encephalopathy


- Plan to continue lactulose (increased to QID) and rifaximin


   





(2) CKD (chronic kidney disease) stage 4, GFR 15-29 ml/min


Code(s): N18.4 - CHRONIC KIDNEY DISEASE, STAGE 4 (SEVERE)   SNOMED Code(s): 

982767711


   Comment: 


- Acute on chronic


- Creatinine continues to rise


- Renal/bladder ultrasound - no significant findings


- Discussed case with Dr. Hernandez and Dr. Lezama


- Continue bicitra   





(3) Anemia


Code(s): D64.9 - ANEMIA, UNSPECIFIED   SNOMED Code(s): 442987451


   Comment: 


- HH stable


- Suspect ACD   





(4) Hypercalcemia


Code(s): E83.52 - HYPERCALCEMIA   SNOMED Code(s): 63034526


   Comment: 


- Suspect secondary to secondary hyperparathyroidism and inactivity


- Unable to perform NM parathyroid scan inpatient


- Dr. Hernandez following   





(5) Hepatic encephalopathy


Code(s): K72.90 - HEPATIC FAILURE, UNSPECIFIED WITHOUT COMA   SNOMED Code(s): 

45705309


   Comment: 


- Ammonia level 90 at admission, trending down


- Continue lactulose and rifaximin    





(6) Cirrhosis of liver with ascites


Code(s): K74.60 - UNSPECIFIED CIRRHOSIS OF LIVER   SNOMED Code(s): 66539413


   Comment: 


- Cirrhosis thought to be due to fatty liver vs GARCIA


- Continue Xifaxan and lactulose for elevated ammonia   





(7) Morbid obesity


Code(s): E66.01 - MORBID (SEVERE) OBESITY DUE TO EXCESS CALORIES   SNOMED Code(s

): 475173687


   Comment: 


- BMI ~ 42   





(8) Afib


Code(s): I48.91 - UNSPECIFIED ATRIAL FIBRILLATION   SNOMED Code(s): 44150105


   Comment: 


- Rate controlled, not currently anticoagulated at home


- Patient has documented allergy to warfarin


- Continue Arixtra


   





(9) Diabetes mellitus


Code(s): E11.9 - TYPE 2 DIABETES MELLITUS WITHOUT COMPLICATIONS   SNOMED Code(s)

: 68883816


   Comment: 


- BGs 60-80s.


- Diet controlled.   





(10) HLD (hyperlipidemia)


Code(s): E78.5 - HYPERLIPIDEMIA, UNSPECIFIED   SNOMED Code(s): 89260998


   Comment: 


- Continue atorvastatin.   





(11) HTN (hypertension)


Code(s): I10 - ESSENTIAL (PRIMARY) HYPERTENSION   SNOMED Code(s): 24934281


   Comment: 


- SBP 's.


- Continue Midodrine TID.   





(12) DVT prophylaxis


Code(s): [id number] -    SNOMED Code(s): 682003156


   Comment: 


- SCDs and fondaparinux, heparin allergy   





(13) Full code status


Code(s): Z78.9 - OTHER SPECIFIED HEALTH STATUS   SNOMED Code(s): 230146216


   


Status and Disposition: 





Inpatient with expected LOS > 2 days.  Anticipate discharge back to [facility] 

when medically stable.
Subjective


Date of Service: 02/02/18


Interval History: 





Pt is feeling ok. He denies any pain or SOB. He is lethargic and not very 

talkative today. 


Family History: Unchanged from Admission


Social History: Unchanged from Admission


Past Medical History: Unchanged from Admission





Objective


Active Medications: 








Atorvastatin Calcium (Lipitor*)  10 mg PO 1900 [facility]


   Last Admin: 02/01/18 20:45 Dose:  10 mg


Calcitonin [place] (Fortical(Nr))  200 units ALT NARE DAILY [facility]


   PRN Reason: Protocol


Citric Acid/Sodium Citrate (Bicitra*)  15 ml PO 0800,2000 [facility]


   Last Admin: 02/02/18 08:01 Dose:  15 ml


Fondaparinux (Arixtra*)  2.5 mg SUBCUT DAILY [facility]


   Last Admin: 02/02/18 08:02 Dose:  2.5 mg


Guaifenesin (Mucinex*)  1,200 mg PO 0800,2000 [facility]


   Last Admin: 02/02/18 08:01 Dose:  1,200 mg


Hydrocortisone (Hytone Cream 1%*)  1 applic TOPICAL BID [facility]


   Last Admin: 02/02/18 08:03 Dose:  1 applic


Sodium Chloride (Ns 0.9% 1000 Ml*)  1,000 mls @ 125 mls/hr IV PER RATE [facility]


   Last Admin: 02/02/18 07:54 Dose:  125 mls/hr


Lactulose (Lactulose*)  30 ml PO QID [facility]


   Last Admin: 02/02/18 13:53 Dose:  30 ml


Midodrine (Midodrine (Nf))  10 mg PO TID [facility]


   PRN Reason: Protocol


   Last Admin: 02/02/18 13:53 Dose:  10 mg


Omeprazole (Prilosec Cap*)  20 mg PO 0700 [facility]


   Last Admin: 02/02/18 06:10 Dose:  20 mg


Rifaximin (Xifaxan*)  550 mg PO 0800,2000 [facility]


   Last Admin: 02/02/18 08:01 Dose:  550 mg


Throat Lozenges (Chloraseptic Kimberly*)  1 kimberly PO Q2H PRN


   PRN Reason: COUGH








 Vital Signs - 8 hr











  02/02/18 02/02/18





  07:56 08:00


 


Temperature 96.3 F 


 


Pulse Rate 107 


 


Respiratory 22 22





Rate  


 


Blood Pressure 106/65 





(mmHg)  


 


O2 Sat by Pulse 94 





Oximetry  











Oxygen Devices in Use Now: None


Appearance: Middle aged morbidly obese male lying in bed sleeping, awakens to 

voice, NAD


Eyes: No Scleral Icterus


Ears/Nose/Mouth/Throat: Mucous Membranes Moist


Respiratory: Symmetrical Chest Expansion and Respiratory Effort, Clear to 

Auscultation - anteriorly


Cardiovascular: NL Sounds; No Murmurs; No JVD, RRR, - - marked anasarca to 

abdomen


Abdominal: NL Sounds; No Tenderness; No Distention


Skin: No Nodules or Sclerosis, - - dry crusty skin to B/L LE


Neurological: - - lethargic


Result Diagrams: 


 01/30/18 06:12





 02/02/18 07:28


Additional Lab and Data: 


 Laboratory Tests











  01/28/18 01/28/18 01/28/18





  06:55 06:55 06:55


 


Hct   


 


Calcium  11.5 H  


 


Ionized Calcium    5.99 H


 


Alkaline Phosphatase  186 H  


 


Ammonia   78 H 


 


Albumin  2.6 L  


 


25-OH Vitamin D Total   


 


PTH Intact   


 


Calcium (PTH Intact)   














  01/28/18 01/28/18 01/28/18





  06:55 06:55 08:20


 


Hct    29 L


 


Calcium   


 


Ionized Calcium   


 


Alkaline Phosphatase   


 


Ammonia   


 


Albumin   


 


25-OH Vitamin D Total   22.0 


 


PTH Intact  4.4  


 


Calcium (PTH Intact)  11.3 H  

















Assess/Plan/Problems-Billing








Mr. Blake is a 61 yo M with a PMH of recent acute on chronic renal failure now 

off hemodialysis, episodes of hyperammonemia who was admitted on 1/25/18 with 

altered mental status.








- Patient Problems


(1) Altered mental status


Current Visit: Yes   Status: Acute   Code(s): R41.82 - ALTERED MENTAL STATUS, 

UNSPECIFIED   SNOMED Code(s): 182472009


   Comment: THe patient remains lethargic. He does seem to answer some 

questions appropriately. Will continue lactulose and rifaximin. Repeat ammonia 

level tomorrow AM. Hypercalcemia also could be playing a large role. Will start 

intranasal calcitonin. Recheck ABG to r/o marked hypercarbia.   





(2) Hypercalcemia


Current Visit: Yes   Status: Acute   Priority: Medium   Code(s): E83.52 - 

HYPERCALCEMIA   SNOMED Code(s): 25215406


   Comment: No clear cause of hypercalcemia identified. Dr. Hernandez thinks 

possibly secondary to inactivity. Will start intranasal calcitonin today. Check 

iCal tomorrow.    





(3) Hepatic encephalopathy


Current Visit: Yes   Status: Acute   Code(s): K72.90 - HEPATIC FAILURE, 

UNSPECIFIED WITHOUT COMA   SNOMED Code(s): 76867747


   Comment: Ammonia last checked 1/31/18. Continue lactulose and rifaximin.    





(4) CKD (chronic kidney disease) stage 4, GFR 15-29 ml/min


Current Visit: Yes   Status: Chronic   Code(s): N18.4 - CHRONIC KIDNEY DISEASE, 

STAGE 4 (SEVERE)   SNOMED Code(s): 302582945


   Comment: The patient's creatinine continues to worsen slowly. ? secondary to 

hypercalcemia. Initiate tx for elevated calcium. Continue bicitra. Dr. Hernandez 

is following.    





(5) Diabetes mellitus


Current Visit: No   Status: Chronic   Code(s): E11.9 - TYPE 2 DIABETES MELLITUS 

WITHOUT COMPLICATIONS   SNOMED Code(s): 49687137


   Comment: THe patient has been hypoglycemic likely from poor intake. Will 

start D5NS at 75ml/hr. Monitor sugars.    





(6) Afib


Current Visit: Yes   Status: Chronic   Code(s): I48.91 - UNSPECIFIED ATRIAL 

FIBRILLATION   SNOMED Code(s): 50815900


   Comment: Rate is controlled. Continue arixra.    





(7) HTN (hypertension)


Current Visit: Yes   Status: Chronic   Code(s): I10 - ESSENTIAL (PRIMARY) 

HYPERTENSION   SNOMED Code(s): 34592289


   Comment: BP is stable and in ok range. Continue Midodrine TID.   





(8) HLD (hyperlipidemia)


Current Visit: Yes   Status: Chronic   Code(s): E78.5 - HYPERLIPIDEMIA, 

UNSPECIFIED   SNOMED Code(s): 24548216


   Comment: Continue atorvastatin.   





(9) Cirrhosis of liver with ascites


Current Visit: Yes   Status: Acute   Code(s): K74.60 - UNSPECIFIED CIRRHOSIS OF 

LIVER   SNOMED Code(s): 97184484


   Comment: Cirrhosis thought to be due to fatty liver. Continue rifaximin and 

lactulose for elevated ammonia.   





(10) DVT prophylaxis


Current Visit: Yes   Status: Acute   Code(s): XQD0695 -    SNOMED Code(s): 

801054196


   Comment: SCDs and fondaparinux   





(11) Full code status


Current Visit: Yes   Status: Acute   Code(s): Z78.9 - OTHER SPECIFIED HEALTH 

STATUS   SNOMED Code(s): 186063163


   Comment: 


   


Status and Disposition: 





.
Subjective


Date of Service: 02/03/18


Interval History: 





pt pulled off his BIPAP in AM once he became more responsive. As per RN pt had 

no UO x last 24H.


Family History: Unchanged from Admission


Social History: Unchanged from Admission


Past Medical History: Unchanged from Admission





Objective


Active Medications: 








Atorvastatin Calcium (Lipitor*)  10 mg PO 1900 [facility]


   Last Admin: 02/02/18 22:19 Dose:  10 mg


Calcitonin [place] (Fortical(Nr))  200 units ALT NARE DAILY [facility]


   PRN Reason: Protocol


Citric Acid/Sodium Citrate (Bicitra*)  15 ml PO 0800,2000 [facility]


   Last Admin: 02/02/18 22:23 Dose:  15 ml


Fondaparinux (Arixtra*)  2.5 mg SUBCUT DAILY [facility]


   Last Admin: 02/02/18 08:02 Dose:  2.5 mg


Guaifenesin (Mucinex*)  1,200 mg PO 0800,2000 [facility]


   Last Admin: 02/02/18 22:20 Dose:  1,200 mg


Hydrocortisone (Hytone Cream 1%*)  1 applic TOPICAL BID [facility]


   Last Admin: 02/02/18 22:31 Dose:  1 applic


Dextrose/Sodium Chloride (D5ns 0.9% 1000 Ml Bag*)  1,000 mls @ 75 mls/hr IV PER 

RATE [facility]


   Last Admin: 02/02/18 22:31 Dose:  75 mls/hr


Lactulose (Lactulose*)  30 ml PO QID [facility]


   Last Admin: 02/02/18 22:23 Dose:  30 ml


Midodrine (Midodrine (Nf))  10 mg PO TID [facility]


   PRN Reason: Protocol


   Last Admin: 02/02/18 22:21 Dose:  10 mg


Nystatin (Nystatin Top Powder*)  1 applic TOPICAL TID [facility]


   Last Admin: 02/02/18 22:25 Dose:  1 applic


Omeprazole (Prilosec Cap*)  20 mg PO 0700 [facility]


   Last Admin: 02/02/18 06:10 Dose:  20 mg


Rifaximin (Xifaxan*)  550 mg PO 0800,2000 [facility]


   Last Admin: 02/02/18 22:20 Dose:  550 mg


Throat Lozenges (Chloraseptic Kimberly*)  1 kimberly PO Q2H PRN


   PRN Reason: COUGH








 Vital Signs - 8 hr











  02/03/18 02/03/18 02/03/18





  02:00 03:00 03:01


 


Temperature   


 


Pulse Rate 105  105


 


Respiratory 20 22 23





Rate   


 


Blood Pressure   102/75





(mmHg)   


 


O2 Sat by Pulse 100  96





Oximetry   














  02/03/18 02/03/18 02/03/18





  03:51 04:00 05:00


 


Temperature  97.0 F 


 


Pulse Rate  94 


 


Respiratory 22 19 23





Rate   


 


Blood Pressure  118/79 





(mmHg)   


 


O2 Sat by Pulse  98 





Oximetry   














  02/03/18 02/03/18 02/03/18





  05:01 06:00 07:20


 


Temperature   


 


Pulse Rate 102 105 


 


Respiratory 16 25 





Rate   


 


Blood Pressure 125/91 113/82 





(mmHg)   


 


O2 Sat by Pulse 97 97 97





Oximetry   














  02/03/18





  08:00


 


Temperature 97.1 F


 


Pulse Rate 


 


Respiratory 





Rate 


 


Blood Pressure 





(mmHg) 


 


O2 Sat by Pulse 





Oximetry 











Oxygen Devices in Use Now: Nasal Cannula


Appearance: 61 yo , obese M in NAD, mild tachypnea noted, increased WOB, 

conversational, oriented to self only


Eyes: No Scleral Icterus, PERRLA


Ears/Nose/Mouth/Throat: NL Teeth, Lips, Gums, Mucous Membranes Moist


Neck: NL Appearance and Movements; NL JVP, Trachea Midline


Respiratory: Symmetrical Chest Expansion and Respiratory Effort, - - rales at b/

l bases crackles b/l mid lungs


Cardiovascular: - - irregular


Lymphatic: No Cervical Adenopathy


Extremities: - - diffuse anasarca noted worse on left side of abdomen and left 

leg than right


Skin: - - left toes cyanotic, cool to touch, R 3rd toe open area of skin tear 

at1 cm in diam, venous stasis changed b/l LE's


Neurological: - - generalized weakness, no focal deficit, speech clear


Result Diagrams: 


 02/03/18 05:46





 02/03/18 06:37


Additional Lab and Data: 


 Laboratory Tests











  01/28/18 01/28/18 01/28/18





  06:55 06:55 06:55


 


Hct   


 


Calcium  11.5 H  


 


Ionized Calcium    5.99 H


 


Alkaline Phosphatase  186 H  


 


Ammonia   78 H 


 


Albumin  2.6 L  


 


25-OH Vitamin D Total   


 


PTH Intact   


 


Calcium (PTH Intact)   














  01/28/18 01/28/18 01/28/18





  06:55 06:55 08:20


 


Hct    29 L


 


Calcium   


 


Ionized Calcium   


 


Alkaline Phosphatase   


 


Ammonia   


 


Albumin   


 


25-OH Vitamin D Total   22.0 


 


PTH Intact  4.4  


 


Calcium (PTH Intact)  11.3 H  

















Assess/Plan/Problems-Billing








Mr. Blake is a 61 yo M with a PMH of recent acute on chronic renal failure now 

off hemodialysis, episodes of hyperammonemia who was admitted on 1/25/18 with 

altered mental status.








- Patient Problems


(1) Altered mental status


Comment: THe patient remains lethargic. He does seem to answer some questions 

appropriately. Will continue lactulose and rifaximin. Repeat ammonia level 

today stable    





(2) Hypercalcemia


Comment: No clear cause of hypercalcemia identified. Dr. Hernandez thinks possibly 

secondary to inactivity.improved ofter intranasal calcitonin .    





(3) Hepatic encephalopathy


Comment: Ammonia still elevated. Continue lactulose and rifaximin. 


supect renal failure an uremia may play a role   





(4) CKD (chronic kidney disease) stage 4, GFR 15-29 ml/min


Comment: No UO x at least 12 hrs. Vega to be placed today. will discuss 

further tx and possible dialysis with Dr. Hernandez.


 Continue bicitra, but pt still has metabolic acidosis.


   





(5) Diabetes mellitus


Comment: THe patient has been hypoglycemic likely from poor intake. Cont  D5NS 

at 75ml/hr. Monitor sugars foe hypoglycemia. still hypoglycemic today. will tx 

with D50   





(6) Afib


Comment: Rate is controlled. Continue arixra.    





(7) Hypotension


Comment: 


 no further episodes on midrodrine


   





(8) Cirrhosis of liver with ascites


Comment: Cirrhosis thought to be due to fatty liver. Continue rifaximin and 

lactulose for elevated ammonia.   





(9) Cyanosis


Comment: of left toes. will get arterial dopplers. Foot not tender to palpation

   





(10) DVT prophylaxis


Comment: SCDs and fondaparinux   


Status and Disposition: 


inpatient


.
Subjective


Date of Service: 02/04/18


Interval History: 





pt had been on BIPAP since last night. Still on it in aM, easily arousable, but 

conversation limited due to mask. C/o no pain.


Pt was incontinent of urine last night and rectal tube was placed due to 

continuous diarrhea secondary to Lactulose


Family History: Unchanged from Admission


Social History: Unchanged from Admission


Past Medical History: Unchanged from Admission





Objective


Active Medications: 








Albuterol (Ventolin 2.5 Mg/3 Ml Neb.Sol*)  2.5 mg INH Q2H PRN


   PRN Reason: SOB/WHEEZING


   Last Admin: 02/03/18 20:53 Dose:  2.5 mg


Atorvastatin Calcium (Lipitor*)  10 mg PO 1900 [facility]


   Last Admin: 02/03/18 20:16 Dose:  10 mg


Calcitonin [place] (Fortical(Nr))  200 units ALT NARE DAILY [facility]


   PRN Reason: Protocol


   Last Admin: 02/03/18 11:47 Dose:  200 units


Citric Acid/Sodium Citrate (Bicitra*)  15 ml PO 0800,2000 [facility]


   Last Admin: 02/03/18 20:16 Dose:  15 ml


Dextrose (D50w Syringe 50 Ml*)  25 gm IV PUSH ONCE PRN


   PRN Reason: FS < 60


   Last Admin: 02/04/18 06:28 Dose:  25 gm


Fondaparinux (Arixtra*)  2.5 mg SUBCUT DAILY [facility]


   Last Admin: 02/03/18 11:48 Dose:  2.5 mg


Guaifenesin (Mucinex*)  1,200 mg PO 0800,2000 [facility]


   Last Admin: 02/03/18 20:16 Dose:  1,200 mg


Hydrocortisone (Hytone Cream 1%*)  1 applic TOPICAL BID [facility]


   Last Admin: 02/03/18 20:16 Dose:  1 applic


Dextrose/Sodium Chloride (D5ns 0.9% 1000 Ml Bag*)  1,000 mls @ 75 mls/hr IV PER 

RATE [facility]


   Last Admin: 02/04/18 07:02 Dose:  75 mls/hr


Lactulose (Lactulose*)  30 ml PO QID [facility]


   Last Admin: 02/03/18 20:16 Dose:  30 ml


Lorazepam (Ativan Inj*)  1 mg IV BEDTIME PRN


   PRN Reason: SLEEP


   Last Admin: 02/03/18 20:57 Dose:  1 mg


Midodrine (Midodrine (Nf))  10 mg PO TID [facility]


   PRN Reason: Protocol


   Last Admin: 02/03/18 20:16 Dose:  10 mg


Nystatin (Nystatin Top Powder*)  1 applic TOPICAL TID [facility]


   Last Admin: 02/03/18 20:16 Dose:  1 applic


Omeprazole (Prilosec Cap*)  20 mg PO 0700 [facility]


   Last Admin: 02/03/18 11:47 Dose:  20 mg


Rifaximin (Xifaxan*)  550 mg PO 0800,2000 [facility]


   Last Admin: 02/03/18 20:16 Dose:  550 mg


Throat Lozenges (Chloraseptic Kimberly*)  1 kimberly PO Q2H PRN


   PRN Reason: COUGH








 Vital Signs - 8 hr











  02/04/18 02/04/18 02/04/18





  02:00 02:01 03:00


 


Temperature   


 


Pulse Rate  107 


 


Respiratory 24 22 22





Rate   


 


Blood Pressure  94/59 





(mmHg)   


 


O2 Sat by Pulse  98 





Oximetry   














  02/04/18 02/04/18 02/04/18





  03:08 03:46 04:00


 


Temperature   


 


Pulse Rate 106  108


 


Respiratory 29 22 23





Rate   


 


Blood Pressure 92/47  98/63





(mmHg)   


 


O2 Sat by Pulse 97  95





Oximetry   














  02/04/18 02/04/18 02/04/18





  04:46 05:00 05:01


 


Temperature 98.1 F  


 


Pulse Rate   106


 


Respiratory  22 19





Rate   


 


Blood Pressure   98/78





(mmHg)   


 


O2 Sat by Pulse   96





Oximetry   














  02/04/18 02/04/18





  05:46 06:00


 


Temperature  


 


Pulse Rate  106


 


Respiratory 22 22





Rate  


 


Blood Pressure  100/52





(mmHg)  


 


O2 Sat by Pulse  96





Oximetry  











Oxygen Devices in Use Now: BiPAP


Appearance: 61 yo M in nAD, BIPAP in place, oriented to self


Eyes: No Scleral Icterus, PERRLA


Ears/Nose/Mouth/Throat: Mucous Membranes Moist


Neck: NL Appearance and Movements; NL JVP, Trachea Midline


Respiratory: Symmetrical Chest Expansion and Respiratory Effort, - - crackles 

at b/l lower lungs


Cardiovascular: NL Sounds; No Murmurs; No JVD, RRR


Abdominal: NL Sounds; No Tenderness; No Distention


Lymphatic: No Cervical Adenopathy


Extremities: - - generalized anasarca worse on left side of body


Skin: No Nodules or Sclerosis, - - excoriations on b/l thighs L>R, no evidence 

of cellulitis. Venosus stasis skin changes b/l distal LE's and small skin tear 

on right second toe


Neurological: - - generalized weakness, no focal deficit


Result Diagrams: 


 02/04/18 05:35





 02/04/18 05:35


Additional Lab and Data: 


 Laboratory Tests











  01/28/18 01/28/18 01/28/18





  06:55 06:55 06:55


 


Hct   


 


Calcium  11.5 H  


 


Ionized Calcium    5.99 H


 


Alkaline Phosphatase  186 H  


 


Ammonia   78 H 


 


Albumin  2.6 L  


 


25-OH Vitamin D Total   


 


PTH Intact   


 


Calcium (PTH Intact)   














  01/28/18 01/28/18 01/28/18





  06:55 06:55 08:20


 


Hct    29 L


 


Calcium   


 


Ionized Calcium   


 


Alkaline Phosphatase   


 


Ammonia   


 


Albumin   


 


25-OH Vitamin D Total   22.0 


 


PTH Intact  4.4  


 


Calcium (PTH Intact)  11.3 H  

















Assess/Plan/Problems-Billing








Mr. Blake is a 61 yo M with a PMH of recent acute on chronic renal failure now 

off hemodialysis, episodes of hyperammonemia who was admitted on 1/25/18 with 

altered mental status.








- Patient Problems


(1) Altered mental status


Comment: THe patient remains lethargic. He does seem to answer some questions 

appropriately. Will continue lactulose and rifaximin. ammonia continues to be >

100 despite Lactulose and rifaximin tx and continious diarrhea.   





(2) Hypercalcemia


Comment: No clear cause of hypercalcemia identified. Dr. Hernandez thinks possibly 

secondary to inactivity.improving ofter intranasal calcitonin .    





(3) Hepatic encephalopathy


Comment: Ammonia still elevated. Continue lactulose and rifaximin. 


supect renal failure an uremia may play a role   





(4) CKD (chronic kidney disease) stage 4, GFR 15-29 ml/min


Comment: As d/w DR. Hernandez , possible dialysis on Monday. Spoke with DR. Rice 

who will place dialysis cath on Monday AM (will hold Arixtra today prior to 

pocedure)


 Continue bicitra for mixed metabolic acidosis.


Pt had been incontinent of urine and his bladder scan was 90 ml on 2/3/18, 

unfortunately DR. Burns was unable to place a Vega on 2/3/18 and recommended 

IR suprapubic cath if indicated. Due to pt being incontinent of urine and no 

signs of retention, will not request suprapubic cath at this point.


   





(5) Diabetes mellitus


Comment: THe patient has been intermittently hypoglycemic likely from poor 

intake. Cont  D5NS at 75ml/hr. Monitor sugars for hypoglycemia.    





(6) Afib


Comment: In NSR today. Holding Arixtra today due to dialysis cath planned for 

AM.   





(7) Hypotension


Comment: 


 no further episodes on midrodrine


   





(8) Cirrhosis of liver with ascites


Comment: Cirrhosis thought to be due to fatty liver. Continue rifaximin and 

lactulose for elevated ammonia.   





(9) Cyanosis


Comment: of left toes noted on 2/3/18, resolved on 2/4/18 . arterial dopplers 

suggest distal SFA stenosis. Foot not tender to palpation today, warm and 

perfused, but poor peripheral pulses noted.Suspect pt's SFA was poorly 

visualised due to anasarca that is worse on left.


will cont to monitor. for now pt is not a candidate for angiogram or CTA due to 

renal failure   





(10) Respiratory acidosis


Comment: Worsened by fluid retention and anasarca. Hope it will improve after 

dialysis started.


For now continue BIPAP   





(11) DVT prophylaxis


Comment: SCDs ,  fondaparinux held today due to planned procedure in aM   


Status and Disposition: 


inpatient


.
none

## 2023-06-27 NOTE — HP
CC:  Dr. Loza*

 

HISTORY AND PHYSICAL:

 

DATE OF ADMISSION:  10/25/17

 

PRIMARY CARE PROVIDER:  Dr. Loza.

 

ATTENDING PHYSICIAN WHILE IN THE HOSPITAL:  Jeronimo Lyman MD* (report being 
dictated by Miguel Hopson NP).

 

CHIEF COMPLAINT:  Fall.

 

HISTORY OF PRESENT ILLNESS:  Mr. Blake is a 62-year-old male patient who does 
have a history of diabetes, CHF, AFib, and hypertension, also hyperlipidemia, 
IBS, and chronic cellulitis with a history of MRSA.  He comes in to the 
[facility] originally with complaints of a mechanical fall out of his 
wheelchair last night. He says that his brother had stopped by, had done his 
laundry and he was taking the laundry back and tossing it out of the way and 
when he did, he, in his words, kept going with the laundry bag and he fell out 
his chair on the floor.  This was around 11 o'clock last night.  He lied on the 
floor all night.  He was unable to get up back on to the bed and was unable to 
call for help, but he knew that this morning his neighbors below him would be 
getting up to go to their jobs as they get up early for their job and he around 
8 o'clock started yelling and hollering for help. They heard him and came to 
his aid and immediately called 911.  He denies having any chest pain.  He does 
not feel anymore short of breath and is in his baseline. He denies any fevers 
or cough, and no chills.  He denied having any abdominal pain and he does admit 
to having vomiting first thing in the morning, but none now and there has not 
been any diarrhea and there has been no dysuria or frequency.  He denied any 
loss of consciousness.  He does state that about 2 weeks ago, it was noted that 
he did have left lower extremity cellulitis and swelling.  He sought care 
particularly for the swelling about a week ago with his primary.  There was not 
any cellulitis documented at that point.  He was set up with the wound clinic. 
He was set up to have this evaluated and unfortunately though when he went to 
[facility] for the fall today, it was noted that there was more erythema and the 
erythema was extending up into his groin, which was new.  He denies any pain.  
He denies any trauma and he does state that over the last week that dressing 
that was applied has been saturated with weeping.  He does state that he has 
not noticed any purulent discharge or pus and does state that there has not 
been any pain.  There was concern though at [facility] because it was noted that 
he appeared to be in acute renal failure.  He appeared to have an elevated 
troponin of unclear etiology and because of this he was sent over to our 
hospital for further evaluation and care.

 

PAST MEDICAL HISTORY:  Significant for:

 

1.  Diabetes.

2.  CHF, unknown EF.  We will try to get records.

3.  AFib, on apixaban.

4.  Hypertension.

5.  Hyperlipidemia.

6.  Depression.

7.  IBS.

8.  History of MRSA.

9.  CKD, unknown, baseline creatinine.

10.  History of diverticulitis.

 

PAST SURGICAL HISTORY:

1.  He has had a bowel resection with colostomy placement secondary to 
diverticulitis and now reversal.

2.  He has had knee surgeries bilaterally.

 

MEDICATIONS:  The home meds according to the ER list from [facility], and again 
we are trying to get a more accurate list, includes:

 

1.  Lasix 40 mg twice a day.

2.  Magnesium oxide 400 mg twice a day.

3.  Simvastatin 40 mg p.o. daily.

4.  Metoprolol 50 mg p.o. daily.

5.  Apixaban 5 mg p.o. b.i.d.

 

ALLERGIES TO MEDICATIONS:  Include KEFLEX, PENICILLIN, XARELTO, WARFARIN, and 
BACTRIM.

 

FAMILY HISTORY:  Both his mother and father, he specifically denied them having 
any cancer, strokes, diabetes, or heart disease.  He says they both  in 
their 80s.

 

SOCIAL HISTORY:  He is former smoker, he quit about 8 months ago.  He does not 
drink alcohol anymore.  He does live alone.  His surrogate decision maker is 
his ex- wife.

 

REVIEW OF SYSTEMS:  There is no documented fever.  He denied any significant 
weight change.  There was no double vision.  He denies having any ear 
discharge.  No rhinorrhea.  No sore throat.  No thyroid enlargement.  Denied 
having any chest pain.  No worsening shortness of breath.  No abdominal pain.  
He did admit to having 1 episode of nausea and vomiting this morning.  No 
diarrhea.  No dysuria. No frequency.  There was no loss of consciousness 
reported.  No pruritus.  He does admit to having the lower extremity swelling 
and the erythema is now new.  Review of 14 systems was completed, all others 
negative.

 

                               PHYSICAL EXAMINATION

 

GENERAL:  At this time, Mr. Blake is a 62-year-old male patient, he appears to be 
chronically ill and he appears to be older than stated age.  He is sitting in 
the hospital bed.  He does not appear to be in any acute distress.

 

VITAL SIGNS:  Here today reveals his blood pressure was 97/57, temperature was 
97.2, respirations were 20, his pulse was noted on telemetry of 101.

 

HEENT:  Head:  Atraumatic.  Eyes:  Sclerae are anicteric.  Throat:  Oral mucosa 
appears to be dry.  No oropharyngeal erythema.

 

NECK:  Supple.

 

LUNGS:  Diminished in the bases, but there was no wheezes or rales.

 

HEART:  Sounds S1 and S2.  Irregularly irregular rate.  No murmurs, rubs, or 
gallops.

 

ABDOMEN:  Soft and flat.  He did have a significant amount of scrotal edema and 
edema to the penis.  Again, nontender on exam.  Bowel sounds present.

 

EXTREMITIES:  Pulses are palpable.  He does have pedal edema bilaterally and 
the left leg does appear to be more swollen compared to the right leg.  There 
are areas of erythema extending from the left knee up into the left groin.  
That was warm to touch.  There was no pain on palpation.  He did have pitting 
edema to the pedal areas again, about +3 bilaterally.  He has sensation and 
range of motion intact at this point.  No obvious gross wounds.  He has a 
significant amount of dry skin to the left lower extremity and there was 
weeping noted.

 

NEUROLOGICAL:  He is awake.  He is alert.  He is oriented x3.  No gross focal 
deficits.

 

SKIN:  Grossly intact with the exception of the aforementioned erythema.

 

 DIAGNOSTIC STUDIES/LAB DATA:  His labs from [facility] today revealed sodium of 
134, potassium of 4.6, chloride of 99, bicarb of 22, BUN of 65.  His creatinine 
was 3, again I do not have previous values, I am going to try to get records.  
His glucose was 104.  His lactic was 2.9.  His calcium was 9.1, mag 2.2, total 
bili is 4.0, AST 46, ALT was 10, his alk phos was 128.  His CK was 1090.  His CK
-MB was 14.2 and his troponin was 0.349.  BNP was 2890.  INR was 1.5.  PTT was 
31.  WBC of 13.4, his RBC was 4.08, hemoglobin 12.0, hematocrit of 37, his 
platelet count was 386,000.

 

There was chest x-ray obtained, I am unable to load it as it is cracked, but 
the impression was read as cardiomegaly, impression:  Massive cardiomegaly.  No 
gross acute cardiopulmonary disease.  He had multiple EKGs at [facility], the one 
from today did show what appeared to be right bundle-branch block with atrial 
fibrillation with a PVC, rate of 120.  I did review it to the previous EKG today
, it does appear to be similar with the exception that the rate now is 120.  
Old medical records were reviewed.

 

ASSESSMENT AND PLAN:  Mr. Blake is a 62-year-old male patient with multiple 
medical problems coming into our hospital today from [facility] as a transfer 
with concerns for cellulitis and sepsis and indeterminate troponins.  He will 
be admitted under inpatient status for:

 

1.  Sepsis as evidenced by he has an elevated white count.  His heart rate 
there was 120.  He also has signs of serious sepsis as again, he does appear to 
be in acute renal failure.  In addition to this, also does appear to have some 
demand ischemia and his lactic was 2.9 over there.  I am going to repeat the 
lactic now, repeat blood cultures here.  I am going to actually give him 
vancomycin for his antibiotic along with cefepime as well.  I will give him 1 g 
every 24 hours of the cefepime given his renal function.  I will give him an 
additional 500 cc bolus as I do not want to fluid overload him.  He did get a 
liter over at [facility].  Then I will put him on normal saline at 100 an hour 
and follow him closely and again follow serial lactates.  We will pan culture 
him.  We will repeat the chest x-ray now and I will send off a urinalysis as 
well.

2.  Atrial fibrillation.  I do note that he was on apixaban outpatient. 
Unfortunately, though I am going to hold off on giving him anymore apixaban.  
He will probably need to be on a heparin drip and I am going to wait until 
tomorrow to start full anticoagulation.  Because of him taking apixaban last 
night and with his renal failure, I would like to make sure that this drug is 
cleared out his system before we anticoagulate him.  I think he should probably 
be bridged.  His CHADS- VASC score is high given his history of congestive 
heart failure, diabetes, and hypertension, but I think we can wait until 
tomorrow.  Right now rate is at 100. We will monitor him for any atrial 
fibrillation with rapid ventricular response.

3.  Acute renal failure.  The patient does state that his creatinine is always 
elevated.  He does not know his numbers, but we will try to get records.  The 
plan will be to get a FeNa.  We will hydrate him.  I am going to hold off on 
his Lasix as I do think that probably there is a component of acute tubular 
necrosis, he may be prerenal and dehydration.  Also, his CK being elevated 
certainly could have caused him to have a little bit of worsening failure, so I 
think we need to hydrate him, follow his renal function closely and avoid 
nephrotoxic agents.

4.  Mild rhabdomyolysis.  Again his CK was in the 1000s.  We are gong to repeat 
these and follow and continue hydration.

5.  Lactic acidosis.  Again, probably secondary to him being septic from the 
cellulitis.  I will go ahead and trend these.  Also he, according to the 
patient was on metformin for his diabetes and that certainly with the elevated 
creatinine could cause this, so we will again follow these serially.

6.  Hypertension.  His blood pressure is in the 90s systolic here.  We will 
hydrate him, hold off on blood pressure meds.

7.  Hyperlipidemia.  Once we have an accurate list, we will continue his meds.

8.  Diabetes.  He will be on lispro sliding scale while he is here.

9.  History of congestive heart failure.  Again, he appears to be dehydrated.  
I do know that his BNP was over 2000 at [facility], but again the acute renal 
failure certainly could throw this number off.  He appears to be dry, so I am 
going to continue with hydration, particularly given the concerns of cellulitis.

10.  Left lower extremity edema.  Again, this does appear to be chronic; however
, I am going to get a wound care consult.  He was to see wound care, but he has 
not been able to set up the appointment.  I am going to get an ultrasound of 
lower extremity.  For now, for dressing changes, I am just going to put him on 
ABDs with Kerlix dressings and we will follow the cellulitis closely.

11.  Scrotal edema.  He is not having any pain currently.  I am just going to 
elevate the scrotum.  We will monitor this.  It was probably going to get worse 
with the IV hydration, but I think at this point he does need the hydration. 
Should there be any issues, we would have a low threshold to consult Urology.

12.  DVT prophylaxis.  I will start him on heparin subcu, but again I think 
within 24 hours from now, we may want to consider bridging him on to a heparin 
drip in the setting of acute illness with his atrial fibrillation as his CHADS-
VASC score is high to prevent cerebrovascular accident.

13.  Code status.  He wished to be a full code.

14.  Fluids, electrolytes, and nutrition.  I did order a heart-healthy diet.

 

TIME SPENT:  On the admission was approximately 60 minutes; greater than half 
the time was spent face-to-face with the patient obtaining my history and 
physical, other half time was spent going over the plan of care with the 
patient and implementing plan of care.

 

I did discuss the plan of care with my attending, Dr. Lyman, he is in 
agreement.

 

 ____________________________________ 

MIGUEL HOPSON NP

 

[id number]/CPS #: [id number]

MARIBEL Valtrex Counseling: I discussed with the patient the risks of valacyclovir including but not limited to kidney damage, nausea, vomiting and severe allergy.  The patient understands that if the infection seems to be worsening or is not improving, they are to call.